# Patient Record
Sex: MALE | Race: WHITE | NOT HISPANIC OR LATINO | Employment: OTHER | URBAN - METROPOLITAN AREA
[De-identification: names, ages, dates, MRNs, and addresses within clinical notes are randomized per-mention and may not be internally consistent; named-entity substitution may affect disease eponyms.]

---

## 2017-01-03 ENCOUNTER — TELEPHONE (OUTPATIENT)
Dept: INTERNAL MEDICINE | Facility: CLINIC | Age: 67
End: 2017-01-03

## 2017-01-03 NOTE — TELEPHONE ENCOUNTER
We could change to Lipitor or simvastatin but it wouldn't work is good.  However about getting Zetia through [facility] and possibly even the simvastatin.  Explain that to him.

## 2017-01-03 NOTE — TELEPHONE ENCOUNTER
Insurance will not cover Vytorin. Can we change to Atorvastatin or Simvastatin. Call him at [phone].

## 2017-01-05 RX ORDER — EZETIMIBE 10 MG/1
10 TABLET ORAL DAILY
Qty: 90 TABLET | Refills: 1 | Status: SHIPPED | OUTPATIENT
Start: 2017-01-05 | End: 2017-08-18 | Stop reason: SDUPTHER

## 2017-01-05 RX ORDER — SIMVASTATIN 40 MG
40 TABLET ORAL NIGHTLY
Qty: 90 TABLET | Refills: 1 | Status: SHIPPED | OUTPATIENT
Start: 2017-01-05 | End: 2017-08-21 | Stop reason: SDUPTHER

## 2017-01-24 ENCOUNTER — OFFICE VISIT (OUTPATIENT)
Dept: INTERNAL MEDICINE | Facility: CLINIC | Age: 67
End: 2017-01-24

## 2017-01-24 VITALS
HEIGHT: 68 IN | BODY MASS INDEX: 35.1 KG/M2 | WEIGHT: 231.6 LBS | OXYGEN SATURATION: 95 % | DIASTOLIC BLOOD PRESSURE: 54 MMHG | HEART RATE: 82 BPM | SYSTOLIC BLOOD PRESSURE: 106 MMHG

## 2017-01-24 DIAGNOSIS — J20.9 ACUTE BRONCHITIS, UNSPECIFIED ORGANISM: Primary | ICD-10-CM

## 2017-01-24 DIAGNOSIS — R63.5 ABNORMAL WEIGHT GAIN: Chronic | ICD-10-CM

## 2017-01-24 DIAGNOSIS — Z91.09 MULTIPLE ENVIRONMENTAL ALLERGIES: Chronic | ICD-10-CM

## 2017-01-24 DIAGNOSIS — E66.9 NON MORBID OBESITY, UNSPECIFIED OBESITY TYPE: Chronic | ICD-10-CM

## 2017-01-24 DIAGNOSIS — E78.5 HYPERLIPIDEMIA, UNSPECIFIED HYPERLIPIDEMIA TYPE: Chronic | ICD-10-CM

## 2017-01-24 DIAGNOSIS — J01.90 ACUTE SINUSITIS, RECURRENCE NOT SPECIFIED, UNSPECIFIED LOCATION: ICD-10-CM

## 2017-01-24 DIAGNOSIS — R73.01 IMPAIRED FASTING GLUCOSE: Chronic | ICD-10-CM

## 2017-01-24 DIAGNOSIS — K21.9 GASTROESOPHAGEAL REFLUX DISEASE WITHOUT ESOPHAGITIS: Chronic | ICD-10-CM

## 2017-01-24 PROBLEM — Z87.09 HISTORY OF ACUTE SINUSITIS: Status: ACTIVE | Noted: 2017-01-24

## 2017-01-24 PROCEDURE — 99214 OFFICE O/P EST MOD 30 MIN: CPT | Performed by: INTERNAL MEDICINE

## 2017-01-24 RX ORDER — GUAIFENESIN AND CODEINE PHOSPHATE 100; 10 MG/5ML; MG/5ML
SOLUTION ORAL
Qty: 180 ML | Refills: 0 | Status: SHIPPED | OUTPATIENT
Start: 2017-01-24 | End: 2017-02-09

## 2017-01-24 RX ORDER — CEFDINIR 300 MG/1
CAPSULE ORAL
Qty: 24 CAPSULE | Refills: 0 | Status: SHIPPED | OUTPATIENT
Start: 2017-01-24 | End: 2017-02-09

## 2017-01-24 NOTE — MR AVS SNAPSHOT
Nilson Buchanan   1/24/2017 7:00 AM   Office Visit    Dept Phone:  [phone]   Encounter #:  [id number]    Provider:  Marco Kenney MD   Department:  [facility]                Your Full Care Plan              Today's Medication Changes          These changes are accurate as of: 1/24/17  7:27 AM.  If you have any questions, ask your nurse or doctor.               New Medication(s)Ordered:     cefdinir 300 MG capsule   Commonly known as:  OMNICEF   1 by mouth twice a day until gone   Started by:  Marco Kenney MD       guaifenesin-codeine 100-10 MG/5ML liquid   Commonly known as:  GUAIFENESIN AC   1 teaspoon by mouth every 4-6 hours as needed for cough   Started by:  Marco Kenney MD            Where to Get Your Medications      These medications were sent to [address] [phone]  - [phone] [address] [address]     Phone:  [phone]     cefdinir 300 MG capsule         You can get these medications from any pharmacy     Bring a paper prescription for each of these medications     guaifenesin-codeine 100-10 MG/5ML liquid                  Your Updated Medication List          This list is accurate as of: 1/24/17  7:27 AM.  Always use your most recent med list.                Avanafil 200 MG tablet       cefdinir 300 MG capsule   Commonly known as:  OMNICEF   1 by mouth twice a day until gone       ezetimibe 10 MG tablet   Commonly known as:  ZETIA   Take 1 tablet by mouth Daily.       fluticasone 50 MCG/ACT nasal spray   Commonly known as:  FLONASE       guaifenesin-codeine 100-10 MG/5ML liquid   Commonly known as:  GUAIFENESIN AC   1 teaspoon by mouth every 4-6 hours as needed for cough       levothyroxine 125 MCG tablet   Commonly known as:  SYNTHROID, LEVOTHROID   TAKE ONE TABLET BY MOUTH DAILY       phentermine 37.5 MG capsule   Take 1 by mouth  every morning as directed       simvastatin 40 MG tablet   Commonly known as:  ZOCOR   Take 1 tablet by mouth Every Night.       STAHIST AD 25-60 MG tablet   Generic drug:  Chlorcyclizine-Pseudoephed   TAKE ONE TABLET BY MOUTH TWICE A DAY FOR CONGESTION       topiramate 50 MG tablet   Commonly known as:  TOPAMAX   1 by mouth daily or as directed       vitamin D3 5000 UNITS capsule capsule               You Were Diagnosed With        Codes Comments    Acute bronchitis, unspecified organism    -  Primary ICD-10-CM: J20.9  ICD-9-CM: 466.0     Acute sinusitis, recurrence not specified, unspecified location     ICD-10-CM: J01.90  ICD-9-CM: 461.9     Abnormal weight gain     ICD-10-CM: R63.5  ICD-9-CM: 783.1     Non morbid obesity, unspecified obesity type     ICD-10-CM: E66.9  ICD-9-CM: 278.00     Impaired fasting glucose     ICD-10-CM: R73.01  ICD-9-CM: 790.21     Hyperlipidemia, unspecified hyperlipidemia type     ICD-10-CM: E78.5  ICD-9-CM: 272.4     Gastroesophageal reflux disease without esophagitis     ICD-10-CM: K21.9  ICD-9-CM: 530.81     Multiple environmental allergies     ICD-10-CM: Z91.09  ICD-9-CM: V15.09       Instructions     None    Patient Instructions History      Upcoming Appointments     Visit Type Date Time Department    OFFICE VISIT 2017  7:00 AM [facility]      [employer] Signup     [facility] [employer] allows you to send messages to your doctor, view your test results, renew your prescriptions, schedule appointments, and more. To sign up, go to [employer] and click on the Sign Up Now link in the New User? box. Enter your [employer] Activation Code exactly as it appears below along with the last four digits of your Social Security Number and your Date of Birth () to complete the sign-up process. If you do not sign up before the expiration date, you must request a new code.    [employer] Activation Code: [email or web address]  Expires: 2017  7:27 AM    If you have questions,  "you can email [email or web address] or call [phone] to talk to our MyChart staff. Remember, [employer] is NOT to be used for urgent needs. For medical emergencies, dial 911.               Other Info from Your Visit           Other Notes About Your Plan     Please DO NOT MOVE diagnosis from the problem list to past medical history!  As the primary care physician, I CAN NOT assess problems in the past.  Those problems are resolved, not active.  You are causing unnecessary work for me and my staff.  If you didn't author it, leave it alone.  Marco Kenney M.D. Internal medicine.             Allergies     No Known Allergies      Vital Signs     Blood Pressure Pulse Height Weight Oxygen Saturation Body Mass Index    106/54 82 68\" (172.7 cm) 231 lb 9.6 oz (105 kg) 95% 35.21 kg/m2    Smoking Status                   Former Smoker           Problems and Diagnoses Noted     Abnormal weight gain    Acute bronchitis    Acute sinus infection    Seasonal allergic rhinitis due to pollen    Benign colon polyp    Acid reflux disease    High cholesterol or triglycerides    Hypogonadism male    Underactive thyroid    Increased fasting blood sugar    Male erectile disorder    Multiple environmental allergies    Non morbid obesity    Sleep apnea    Vitamin D deficiency      No Longer an Issue     Family history of liver cancer        "

## 2017-01-24 NOTE — PROGRESS NOTES
01/24/2017    Patient Information  Nilson Buchanan                                                                                          [address] [facility] [facility] [address]      1950  [phone]      Chief Complaint:     Complaining of head congestion and cough.  Follow-up abnormal weight gain/non-morbid obesity.    History of Present Illness:    Patient with a history of abnormal weight gain/nonmorbid obesity and various other comorbidities including impaired fasting glucose, esophageal reflux, hyperlipidemia, and sleep apnea.  He presents today initially to follow-up on recent initiation of medication for his abnormal weight gain.  However, he has complaints consistent with sinusitis and acute bronchitis which will be described below.  Past medical history reviewed and updated where necessary.      The history regarding head congestion and cough:    01/24/2017--patient presents with a two-week history of head congestion, cough productive of thick yellow phlegm which causes a severe frontal headache.  No significant nasal drainage.  No definite pressure in his sinuses but there is definitely pain when he coughs.  No fever, chills, or other systemic signs or symptoms.  Examination reveals boggy nasal mucosa.  There is no tenderness to percussion over the sinuses.  No cervical adenopathy.  Lung examination reveals mild bilateral rhonchi but no wheezes.  Assessment is acute sinusitis and acute bronchitis.  Cefdinir 300 mg by mouth 3 times a day ×12 days.  Patient should continue Stahist AD.  Cheratussin cough syrup.      The history regarding abnormal weight gain/non-morbid obesity:    01/24/2017--patient seen in follow-up and has lost 10 pounds.  His weight is now 231.  Continue present regimen.    12/19/2016--patient reports a several year history of weight gain despite multiple attempts at various diet and exercise regimens.  It seemed like anything that he tries is unsuccessful.  He  has several comorbidities that would benefit from weight loss including hyperlipidemia, hypogonadism, hypothyroidism, impaired fasting glucose.  Patient would like to try phentermine and I think this is certainly reasonable.  Phentermine 1 by mouth daily.  Topiramate 50 mg by mouth daily.  Patient will follow-up in about one month to reassess.      Review of Systems   Constitution: Negative.   HENT: Positive for congestion.    Eyes: Negative.    Cardiovascular: Negative.    Respiratory: Positive for cough and sputum production.    Endocrine: Negative.    Hematologic/Lymphatic: Negative.    Skin: Negative.    Musculoskeletal: Negative.    Gastrointestinal: Negative.    Genitourinary: Negative.    Neurological: Negative.    Psychiatric/Behavioral: Negative.    Allergic/Immunologic: Negative.        Active Problems:    Patient Active Problem List   Diagnosis   • Allergic rhinitis   • Benign colon polyp, 2010--serrated adenoma ×1.  01/14/2016--normal study.   • Gastroesophageal reflux disease without esophagitis   • Hyperlipidemia   • Hypogonadism male, patient elects no TRT.   • Hypothyroidism   • Impaired fasting glucose   • Male erectile disorder   • Multiple environmental allergies   • Obstructive sleep apnea, 09/08/2005--AHI 18.2.  Oxygen saturation 81%.  Patient cannot tolerate CPAP.   • Vitamin D deficiency   • Acute bronchitis   • Therapeutic drug monitoring   • Prostate cancer screening   • Abnormal weight gain   • Non morbid obesity   • Acute sinusitis         Past Medical History   Diagnosis Date   • History of acute bronchitis with bronchospasm 01/22/2014 01/22/2014--patient presented with a one-week history of cough productive of clear phlegm associated with a marked frontal headache particularly when he coughs. It low-grade fever but no shaking chills. There was some shortness of breath with exertion. Patient treated with Levaquin 750 mg daily x8 days, prednisone 50 mg daily x7 days taper and  discontinue.   • History of Acute serous otitis media 12/22/2014 12/08/2015--patient reports he went to the emergency room 12/22/2014 with complaints of decreased hearing in his left ear associated with tenderness at the angle of the jaw and below the left ear. He also had a slight cough. He had a definite fever of over 102. Influenza test was reportedly negative. Patient was told that he had possible pneumonia and he was treated with Levaquin 500 mg by mouth d   • History of Ankle pain 6/24/2015 06/24/2015--left foot and ankle x-rays reveal hardware from a chronic fracture site distal fibula.  Calcaneal spurring noted.  Degenerative phenomena within the mid foot.  06/24/2015--patient presents with a 3-1/2 week history of left anterior ankle and dorsal foot pain.  This occurred after being attacked by a bull while he was attempting to load it on a truck.  He was charged by the bull and scrambled to jump over a fence and this is when he injured his foot and ankle.  He reports he could not bear weight during the first week but it has improved somewhat here of late.  Examination reveals no obvious ecchymosis or gross deformity.  There is soft tissue swelling present anterior ankle and dorsum of foot.  X-rays of the ankle and foot ordered.   • History of BPPV (benign paroxysmal positional vertigo) 6/20/2016 12/19/2016--patient reports symptoms have resolved.  06/20/2016--patient presents with a new complaint of a several week history of intermittent episodes of dizziness described as a spinning sensation.  It is always associated with abrupt standing for lying down to go to bed.  Only lasts for a very short period of time and there is no other associated symptoms.  He does not feel the symptoms are bad enough at the present time to warrant vestibular therapy.   • History of cardiovascular stress test 03/26/2011 03/26/2011--exercise treadmill stress test revealed no evidence of myocardial ischemia.  Adequate exercise tolerance.   • History of chest x-ray 12/22/2014 12/22/2014--chest x-ray obtained at [facility] ER reveals normal portable chest x-ray.   11/20/2014--chest x-ray reveals somewhat decreased lung volumes with some vascular crowding at the bases compared to the study 02/19/2013. However, I see no definite localized pneumonia and the heart size is normal.   02/19/2013--normal chest x-ray.   • History of Foot pain 6/24/2015 06/24/2015--left foot and ankle x-rays reveal hardware from a chronic fracture site distal fibula.  Calcaneal spurring noted.  Degenerative phenomena within the mid foot.  06/24/2015--patient presents with a 3-1/2 week history of left anterior ankle and dorsal foot pain.  This occurred after being attacked by a bull while he was attempting to load it on a truck.  He was charged by the bull and scrambled to jump over a fence and this is when he injured his foot and ankle.  He reports he could not bear weight during the first week but it has improved somewhat here of late.  Examination reveals no obvious ecchymosis or gross deformity.  There is soft tissue swelling present anterior ankle and dorsum of foot.  X-rays of the ankle and foot ordered.   • History of pneumococcal vaccination 06/20/2016 06/20/2016--Prevnar 13 given.  Will repeat PPSV 23 in about one year.  05/11/2015--PPSV 23 given. Patient is exactly 65 years of age.   • History of pneumonia 12/06/2014 12/06/2014--patient seen in follow up and reports he is definitely better but he is still having a cough that seems to come and go. This been no fever. He still has some shortness of breath but this is better. Lung examination reveals continued bibasilar crackles but they are somewhat improved over previous. I reviewed the chest x-ray results and even though the chest x-ray shows no definite pneum   • History of tetanus, diphtheria, and acellular pertussis booster vaccination (Tdap) 10/18/2013      10/18/2013--Tdap given   • History of Thoracic compression fracture 03/28/2004 03/28/2004--patient fell and suffered a moderate T12 compression fracture. No subluxation or retropulsion of fracture fragments is seen. Approximately 40% loss of anterior vertebral body height.   • History of Zostavax administration 10/20/2014     10/20/2014--Zostavax given at the [facility].         Past Surgical History   Procedure Laterality Date   • Ankle surgery Left 2004 2004--repair of left ankle fracture with hardware placement.   • Colonoscopy  05/10/2010     05/10/2010--colonoscopy revealed a 3 mm cecal polyp that was removed completely. The remainder of the colonoscopy was normal. Recommend repeat colonoscopy in 5 years. If the polyp is adenomatous, would recommend all first-degree relatives over age 40 be in screening as well. Pathology returned serrated adenoma.   • Tonsillectomy  Childhood     Childhood tonsillectomy   • Colonoscopy  01/14/2016 01/14/2016--normal colonoscopy.  Recommended repeat study in 10 years.   • Carpal tunnel release Right 1989 1989--right carpal tunnel release.   • Laparoscopic cholecystectomy  04/03/2012 04/03/2012--laparoscopic cholecystectomy. Pathology returned cholesterolosis.         No Known Allergies        Current Outpatient Prescriptions:   •  Avanafil 200 MG tablet, Take  by mouth. Take as directed prior to anticipated sexual activity., Disp: , Rfl:   •  Cholecalciferol (VITAMIN D3) 5000 UNITS capsule capsule, Take 1 capsule by mouth daily., Disp: , Rfl:   •  ezetimibe (ZETIA) 10 MG tablet, Take 1 tablet by mouth Daily., Disp: 90 tablet, Rfl: 1  •  fluticasone (FLONASE) 50 MCG/ACT nasal spray, 2 sprays into each nostril daily. Spray in each nostril., Disp: , Rfl:   •  levothyroxine (SYNTHROID, LEVOTHROID) 125 MCG tablet, TAKE ONE TABLET BY MOUTH DAILY, Disp: 90 tablet, Rfl: 1  •  phentermine 37.5 MG capsule, Take 1 by mouth every morning as directed, Disp: 30 capsule,  "Rfl: 1  •  simvastatin (ZOCOR) 40 MG tablet, Take 1 tablet by mouth Every Night., Disp: 90 tablet, Rfl: 1  •  STAHIST AD 25-60 MG tablet, TAKE ONE TABLET BY MOUTH TWICE A DAY FOR CONGESTION, Disp: 60 tablet, Rfl: 0  •  topiramate (TOPAMAX) 50 MG tablet, 1 by mouth daily or as directed, Disp: 30 tablet, Rfl: 4      Family History   Problem Relation Age of Onset   • Cancer Father      Liver Cancer. Father  from liver cancer.   • Heart attack Brother      Acute Myocardial Infarction.  Brother had a myocardial infarction at age 68.         Social History     Social History   • Marital status:      Spouse name: N/A   • Number of children: N/A   • Years of education: N/A     Occupational History   • Retired - [employer]      Social History Main Topics   • Smoking status: Former Smoker   • Smokeless tobacco: Never Used      Comment: Stopped smoking at age 35.   • Alcohol use Yes      Comment: Moderately   • Drug use: No   • Sexual activity: Yes     Partners: Female     Other Topics Concern   • Not on file     Social History Narrative         Vitals:    17 0653   BP: 106/54   Pulse: 82   SpO2: 95%   Weight: 231 lb 9.6 oz (105 kg)   Height: 68\" (172.7 cm)          Physical Exam:    General: Alert and oriented x 3. Obese.  No acute distress.  Normal affect.  HEENT: Pupils equal, round, reactive to light; extraocular movements intact; sclerae nonicteric; pharynx, ear canals and TMs normal.  There is boggy nasal mucosa but no definite tenderness to percussion over the sinuses.  Neck: Without JVD, thyromegaly, bruit, or adenopathy.  Lungs: Mild bilateral rhonchi without wheezes or signs of consolidation..  Heart: Regular rate and rhythm without murmur, rub, gallop, or click.  Abdomen: Soft, nontender, without hepatosplenomegaly or hernia.  Bowel sounds normal.  : Deferred.  Rectal: Deferred.  Extremities: Without clubbing, cyanosis, edema, or pulse deficit.  Neurologic: Intact without focal deficit.  Normal station " and gait observed during ingress and egress from the examination room.  Skin: Without significant lesion.  Musculoskeletal: Unremarkable.      Lab/other results:      Assessment/Plan:     Diagnosis Plan   1. Acute bronchitis, unspecified organism     2. Acute sinusitis, recurrence not specified, unspecified location     3. Abnormal weight gain     4. Non morbid obesity, unspecified obesity type     5. Impaired fasting glucose     6. Hyperlipidemia, unspecified hyperlipidemia type     7. Gastroesophageal reflux disease without esophagitis     8. Multiple environmental allergies       Patient presents with a history and exam consistent with acute bronchitis and acute sinusitis.  It seems he has an episode of this year about this time of year.  He does have environmental allergies which certainly could be playing a role.  As far as his abnormal weight gain, he is making satisfactory progress on the current medical regimen and we will continue the same.    Start cefdinir 300 mg by mouth twice a day ×12 days.  Continue Stahist AD.  Cheratussin cough syrup. Patient will follow-up in about 6 weeks to reassess the abnormal weight gain.            Procedures

## 2017-02-06 RX ORDER — FLUTICASONE PROPIONATE 50 MCG
SPRAY, SUSPENSION (ML) NASAL
Qty: 3 BOTTLE | Refills: 9 | Status: SHIPPED | OUTPATIENT
Start: 2017-02-06 | End: 2018-12-27 | Stop reason: SDUPTHER

## 2017-02-09 ENCOUNTER — OFFICE VISIT (OUTPATIENT)
Dept: INTERNAL MEDICINE | Facility: CLINIC | Age: 67
End: 2017-02-09

## 2017-02-09 VITALS
BODY MASS INDEX: 35.16 KG/M2 | WEIGHT: 232 LBS | DIASTOLIC BLOOD PRESSURE: 68 MMHG | SYSTOLIC BLOOD PRESSURE: 114 MMHG | HEART RATE: 90 BPM | OXYGEN SATURATION: 97 % | HEIGHT: 68 IN

## 2017-02-09 DIAGNOSIS — N40.1 BENIGN NON-NODULAR PROSTATIC HYPERPLASIA WITH LOWER URINARY TRACT SYMPTOMS: ICD-10-CM

## 2017-02-09 DIAGNOSIS — N41.0 ACUTE PROSTATITIS: ICD-10-CM

## 2017-02-09 DIAGNOSIS — R30.0 DYSURIA: Primary | ICD-10-CM

## 2017-02-09 PROBLEM — Z87.09 HISTORY OF ACUTE SINUSITIS: Status: RESOLVED | Noted: 2017-01-24 | Resolved: 2017-02-09

## 2017-02-09 LAB
BILIRUB BLD-MCNC: NEGATIVE MG/DL
GLUCOSE UR STRIP-MCNC: NEGATIVE MG/DL
KETONES UR QL: NEGATIVE
LEUKOCYTE EST, POC: NEGATIVE
NITRITE UR-MCNC: NEGATIVE MG/ML
PH UR: 6 [PH] (ref 5–8)
PROT UR STRIP-MCNC: NEGATIVE MG/DL
RBC # UR STRIP: NEGATIVE /UL
SP GR UR: 1.02 (ref 1–1.03)
UROBILINOGEN UR QL: NORMAL

## 2017-02-09 PROCEDURE — 81003 URINALYSIS AUTO W/O SCOPE: CPT | Performed by: INTERNAL MEDICINE

## 2017-02-09 PROCEDURE — 99214 OFFICE O/P EST MOD 30 MIN: CPT | Performed by: INTERNAL MEDICINE

## 2017-02-09 RX ORDER — TAMSULOSIN HYDROCHLORIDE 0.4 MG/1
CAPSULE ORAL
Qty: 30 CAPSULE | Refills: 6 | Status: SHIPPED | OUTPATIENT
Start: 2017-02-09 | End: 2017-07-11

## 2017-02-09 RX ORDER — CIPROFLOXACIN 500 MG/1
TABLET, FILM COATED ORAL
Qty: 60 TABLET | Refills: 0 | Status: SHIPPED | OUTPATIENT
Start: 2017-02-09 | End: 2017-03-10

## 2017-02-09 NOTE — PROGRESS NOTES
02/09/2017    Patient Information  Nilson Buchanan                                                                                          [address] [facility] [place] [address]      1950  [phone]      Chief Complaint:     Complaining of problems with urination.    History of Present Illness:    Patient with a history of impaired fasting glucose, hypothyroidism, hypogonadism, hyperlipidemia, esophageal reflux, sleep apnea, history of colon polyps.  He presents today with some problems with urination as will be described below.  Past medical history reviewed and updated where necessary.  This reveals he is up-to-date on his immunizations and also up-to-date on his colonoscopy.  He had a vascular screening study last year but unfortunately we do not have that report.  We are trying to obtain that.    The history regarding urinary difficulties is as follows:    02/29/2017--patient reports sudden onset a few days ago with dysuria and BPH obstructive symptoms.  He had intercourse recently and after that begin to have burning with urination which has continued.  He subsequently developed intermittent obstructive symptoms consisting of urgency, weak stream, dribbling and sensation of incomplete voiding.  Digital rectal examination avoided today.  Urinalysis, urine culture, PSA ordered.  Empiric Cipro 500 mg by mouth twice a day ×30 days, Flomax 0.4 mg by mouth daily.  I will have patient follow-up after completion of antibiotics with a PSA 2 days after completion of the antibiotics providing his PSA comes back elevated or above baseline.    Review of Systems   Constitution: Negative.   HENT: Negative.    Eyes: Negative.    Cardiovascular: Negative.    Respiratory: Negative.    Endocrine: Negative.    Hematologic/Lymphatic: Negative.    Skin: Negative.    Musculoskeletal: Negative.    Gastrointestinal: Negative.    Genitourinary: Positive for dysuria, frequency, hesitancy, incomplete emptying and  urgency.   Neurological: Negative.    Psychiatric/Behavioral: Negative.    Allergic/Immunologic: Negative.        Active Problems:    Patient Active Problem List   Diagnosis   • Allergic rhinitis   • Benign colon polyp, 2010--serrated adenoma ×1.  01/14/2016--normal study.   • Gastroesophageal reflux disease without esophagitis   • Hyperlipidemia   • Hypogonadism male, patient elects no TRT.   • Hypothyroidism   • Impaired fasting glucose   • Male erectile disorder   • Multiple environmental allergies   • Obstructive sleep apnea, 09/08/2005--AHI 18.2.  Oxygen saturation 81%.  Patient cannot tolerate CPAP.   • Vitamin D deficiency   • Therapeutic drug monitoring   • Abnormal weight gain   • Non morbid obesity   • Dysuria   • Acute prostatitis   • Benign prostatic hypertrophy         Past Medical History   Diagnosis Date   • History of acute bronchitis 4/29/2016 01/24/2017--patient presents with a two-week history of head congestion, cough productive of thick yellow phlegm which causes a severe frontal headache.  No significant nasal drainage.  No definite pressure in his sinuses but there is definitely pain when he coughs.  No fever, chills, or other systemic signs or symptoms.  Examination reveals boggy nasal mucosa.  There is no tenderness to percussion over the sinuses.  No cervical adenopathy.  Lung examination reveals mild bilateral rhonchi but no wheezes.  Assessment is acute sinusitis and acute bronchitis.  Cefdinir 300 mg by mouth 3 times a day ×12 days.  Patient should continue Stahist AD.  Cheratussin cough syrup.  02/12/2015--patient presents with a 2 day history of head congestion, sinus pressure, posterior nasal drainage productive of yellow phlegm. Occasional cough. Exam reveals boggy nasal mucosa and tenderness to percussion over the sinuses. Clindamycin 300 mg by mouth 3 times a day 12 days.   05/23/2014--patient reports his symptoms have resolve   • History of acute bronchitis with bronchospasm  01/22/2014 01/22/2014--patient presented with a one-week history of cough productive of clear phlegm associated with a marked frontal headache particularly when he coughs. It low-grade fever but no shaking chills. There was some shortness of breath with exertion. Patient treated with Levaquin 750 mg daily x8 days, prednisone 50 mg daily x7 days taper and discontinue.   • History of Acute serous otitis media 12/22/2014 12/08/2015--patient reports he went to the emergency room 12/22/2014 with complaints of decreased hearing in his left ear associated with tenderness at the angle of the jaw and below the left ear. He also had a slight cough. He had a definite fever of over 102. Influenza test was reportedly negative. Patient was told that he had possible pneumonia and he was treated with Levaquin 500 mg by mouth d   • History of acute sinusitis 1/24/2017 01/24/2017--patient presents with a two-week history of head congestion, cough productive of thick yellow phlegm which causes a severe frontal headache.  No significant nasal drainage.  No definite pressure in his sinuses but there is definitely pain when he coughs.  No fever, chills, or other systemic signs or symptoms.  Examination reveals boggy nasal mucosa.  There is no tenderness to percussion over the sinuses.  No cervical adenopathy.  Lung examination reveals mild bilateral rhonchi but no wheezes.  Assessment is acute sinusitis and acute bronchitis.  Cefdinir 300 mg by mouth 3 times a day ×12 days.  Patient should continue Stahist AD.  Cheratussin cough syrup.  02/12/2015--patient presents with a 2 day history of head congestion, sinus pressure, posterior nasal drainage productive of yellow phlegm. Occasional cough. Exam reveals boggy nasal mucosa and tenderness to percussion over the sinuses. Clindamycin 300 mg by mouth 3 times a day 12 days.   05/23/2014--patient reports his symptoms have resolve   • History of Ankle pain 6/24/2015      06/24/2015--left foot and ankle x-rays reveal hardware from a chronic fracture site distal fibula.  Calcaneal spurring noted.  Degenerative phenomena within the mid foot.  06/24/2015--patient presents with a 3-1/2 week history of left anterior ankle and dorsal foot pain.  This occurred after being attacked by a bull while he was attempting to load it on a truck.  He was charged by the bull and scrambled to jump over a fence and this is when he injured his foot and ankle.  He reports he could not bear weight during the first week but it has improved somewhat here of late.  Examination reveals no obvious ecchymosis or gross deformity.  There is soft tissue swelling present anterior ankle and dorsum of foot.  X-rays of the ankle and foot ordered.   • History of BPPV (benign paroxysmal positional vertigo) 6/20/2016 12/19/2016--patient reports symptoms have resolved.  06/20/2016--patient presents with a new complaint of a several week history of intermittent episodes of dizziness described as a spinning sensation.  It is always associated with abrupt standing for lying down to go to bed.  Only lasts for a very short period of time and there is no other associated symptoms.  He does not feel the symptoms are bad enough at the present time to warrant vestibular therapy.   • History of cardiovascular stress test 03/26/2011 03/26/2011--exercise treadmill stress test revealed no evidence of myocardial ischemia. Adequate exercise tolerance.   • History of chest x-ray 12/22/2014 12/22/2014--chest x-ray obtained at [facility] reveals normal portable chest x-ray.   11/20/2014--chest x-ray reveals somewhat decreased lung volumes with some vascular crowding at the bases compared to the study 02/19/2013. However, I see no definite localized pneumonia and the heart size is normal.   02/19/2013--normal chest x-ray.   • History of Foot pain 6/24/2015 06/24/2015--left foot and ankle x-rays reveal hardware from a  chronic fracture site distal fibula.  Calcaneal spurring noted.  Degenerative phenomena within the mid foot.  06/24/2015--patient presents with a 3-1/2 week history of left anterior ankle and dorsal foot pain.  This occurred after being attacked by a bull while he was attempting to load it on a truck.  He was charged by the bull and scrambled to jump over a fence and this is when he injured his foot and ankle.  He reports he could not bear weight during the first week but it has improved somewhat here of late.  Examination reveals no obvious ecchymosis or gross deformity.  There is soft tissue swelling present anterior ankle and dorsum of foot.  X-rays of the ankle and foot ordered.   • History of pneumococcal vaccination 06/20/2016 06/20/2016--Prevnar 13 given.  Will repeat PPSV 23 in about one year.  05/11/2015--PPSV 23 given. Patient is exactly 65 years of age.   • History of pneumonia 12/06/2014 12/06/2014--patient seen in follow up and reports he is definitely better but he is still having a cough that seems to come and go. This been no fever. He still has some shortness of breath but this is better. Lung examination reveals continued bibasilar crackles but they are somewhat improved over previous. I reviewed the chest x-ray results and even though the chest x-ray shows no definite pneum   • History of tetanus, diphtheria, and acellular pertussis booster vaccination (Tdap) 10/18/2013     10/18/2013--Tdap given   • History of Thoracic compression fracture 03/28/2004 03/28/2004--patient fell and suffered a moderate T12 compression fracture. No subluxation or retropulsion of fracture fragments is seen. Approximately 40% loss of anterior vertebral body height.   • History of Zostavax administration 10/20/2014     10/20/2014--Zostavax given at the [facility].         Past Surgical History   Procedure Laterality Date   • Ankle surgery Left 2004 2004--repair of left ankle fracture with hardware  placement.   • Colonoscopy  05/10/2010     05/10/2010--colonoscopy revealed a 3 mm cecal polyp that was removed completely. The remainder of the colonoscopy was normal. Recommend repeat colonoscopy in 5 years. If the polyp is adenomatous, would recommend all first-degree relatives over age 40 be in screening as well. Pathology returned serrated adenoma.   • Tonsillectomy  Childhood     Childhood tonsillectomy   • Colonoscopy  2016--normal colonoscopy.  Recommended repeat study in 10 years.   • Carpal tunnel release Right 1989--right carpal tunnel release.   • Laparoscopic cholecystectomy  2012--laparoscopic cholecystectomy. Pathology returned cholesterolosis.         No Known Allergies        Current Outpatient Prescriptions:   •  Avanafil 200 MG tablet, Take  by mouth. Take as directed prior to anticipated sexual activity., Disp: , Rfl:   •  Cholecalciferol (VITAMIN D3) 5000 UNITS capsule capsule, Take 1 capsule by mouth daily., Disp: , Rfl:   •  ezetimibe (ZETIA) 10 MG tablet, Take 1 tablet by mouth Daily., Disp: 90 tablet, Rfl: 1  •  fluticasone (FLONASE) 50 MCG/ACT nasal spray, PLACE 2 SPRAYS IN EACH NOSTRIL DAILY, Disp: 3 bottle, Rfl: 9  •  levothyroxine (SYNTHROID, LEVOTHROID) 125 MCG tablet, TAKE ONE TABLET BY MOUTH DAILY, Disp: 90 tablet, Rfl: 1  •  phentermine 37.5 MG capsule, Take 1 by mouth every morning as directed, Disp: 30 capsule, Rfl: 1  •  simvastatin (ZOCOR) 40 MG tablet, Take 1 tablet by mouth Every Night., Disp: 90 tablet, Rfl: 1  •  STAHIST AD 25-60 MG tablet, TAKE ONE TABLET BY MOUTH TWICE A DAY FOR CONGESTION, Disp: 60 tablet, Rfl: 0  •  topiramate (TOPAMAX) 50 MG tablet, 1 by mouth daily or as directed, Disp: 30 tablet, Rfl: 4      Family History   Problem Relation Age of Onset   • Cancer Father      Liver Cancer. Father  from liver cancer.   • Heart attack Brother      Acute Myocardial Infarction.  Brother had a myocardial infarction at  "age 68.         Social History     Social History   • Marital status:      Spouse name: N/A   • Number of children: N/A   • Years of education: N/A     Occupational History   • Retired - [employer]      Social History Main Topics   • Smoking status: Former Smoker   • Smokeless tobacco: Never Used      Comment: Stopped smoking at age 35.   • Alcohol use Yes      Comment: Moderately   • Drug use: No   • Sexual activity: Yes     Partners: Female     Other Topics Concern   • Not on file     Social History Narrative         Vitals:    02/09/17 1123   BP: 114/68   Pulse: 90   SpO2: 97%   Weight: 232 lb (105 kg)   Height: 68\" (172.7 cm)          Physical Exam:    General: Alert and oriented x 3. Obese.  No acute distress.  Normal affect.  HEENT: Pupils equal, round, reactive to light; extraocular movements intact; sclerae nonicteric; pharynx, ear canals and TMs normal.  Neck: Without JVD, thyromegaly, bruit, or adenopathy.  Lungs: Clear to auscultation in all fields.  Heart: Regular rate and rhythm without murmur, rub, gallop, or click.  Abdomen: Soft, nontender, without hepatosplenomegaly or hernia.  Bowel sounds normal.  : Deferred.  Rectal: Deferred.  Extremities: Without clubbing, cyanosis, edema, or pulse deficit.  Neurologic: Intact without focal deficit.  Normal station and gait observed during ingress and egress from the examination room.  Skin: Without significant lesion.  Musculoskeletal: Unremarkable.      Lab/other results:    Automated urinalysis is negative.    Assessment/Plan:     Diagnosis Plan   1. Dysuria  Urine Culture    PSA    POC Urinalysis Dipstick, Automated   2. Acute prostatitis  PSA   3. Benign prostatic hypertrophy  PSA       Patient presents with a history consistent with acute prostatitis and worsening BPH symptoms.  No fever, chills, or other systemic signs or symptoms.    Plan is as follows: Urine culture and PSA sent.  Automated urinalysis already done and was negative.  Start empiric " Cipro 500 mg by mouth twice a day ×30 days.  Start Flomax 0.4 mg by mouth daily which will hopefully be temporary.  Patient will get nonfasting lab work for a PSA in 32 days and then follow-up with me 2 days later.          Procedures

## 2017-02-10 ENCOUNTER — RESULTS ENCOUNTER (OUTPATIENT)
Dept: INTERNAL MEDICINE | Facility: CLINIC | Age: 67
End: 2017-02-10

## 2017-02-10 DIAGNOSIS — N40.1 BENIGN NON-NODULAR PROSTATIC HYPERPLASIA WITH LOWER URINARY TRACT SYMPTOMS: ICD-10-CM

## 2017-02-10 DIAGNOSIS — N41.0 ACUTE PROSTATITIS: ICD-10-CM

## 2017-02-10 DIAGNOSIS — R30.0 DYSURIA: ICD-10-CM

## 2017-02-11 LAB
BACTERIA UR CULT: NO GROWTH
BACTERIA UR CULT: NORMAL
PSA SERPL-MCNC: 1.03 NG/ML (ref 0–4)

## 2017-02-20 RX ORDER — LEVOTHYROXINE SODIUM 0.12 MG/1
TABLET ORAL
Qty: 90 TABLET | Refills: 0 | Status: SHIPPED | OUTPATIENT
Start: 2017-02-20 | End: 2017-05-19 | Stop reason: SDUPTHER

## 2017-02-20 RX ORDER — PHENTERMINE HYDROCHLORIDE 37.5 MG/1
TABLET ORAL
Qty: 30 TABLET | Refills: 2 | OUTPATIENT
Start: 2017-02-20 | End: 2017-03-10

## 2017-03-08 LAB — PSA SERPL-MCNC: 0.63 NG/ML (ref 0–4)

## 2017-03-10 ENCOUNTER — OFFICE VISIT (OUTPATIENT)
Dept: INTERNAL MEDICINE | Facility: CLINIC | Age: 67
End: 2017-03-10

## 2017-03-10 VITALS
OXYGEN SATURATION: 98 % | HEART RATE: 95 BPM | WEIGHT: 229 LBS | HEIGHT: 68 IN | BODY MASS INDEX: 34.71 KG/M2 | DIASTOLIC BLOOD PRESSURE: 60 MMHG | SYSTOLIC BLOOD PRESSURE: 110 MMHG

## 2017-03-10 DIAGNOSIS — N40.1 BENIGN NON-NODULAR PROSTATIC HYPERPLASIA WITH LOWER URINARY TRACT SYMPTOMS: ICD-10-CM

## 2017-03-10 DIAGNOSIS — R63.5 ABNORMAL WEIGHT GAIN: Chronic | ICD-10-CM

## 2017-03-10 DIAGNOSIS — N41.0 ACUTE PROSTATITIS: Primary | ICD-10-CM

## 2017-03-10 DIAGNOSIS — E03.9 HYPOTHYROIDISM, UNSPECIFIED TYPE: Chronic | ICD-10-CM

## 2017-03-10 DIAGNOSIS — Z51.81 THERAPEUTIC DRUG MONITORING: ICD-10-CM

## 2017-03-10 DIAGNOSIS — E66.9 NON MORBID OBESITY, UNSPECIFIED OBESITY TYPE: Chronic | ICD-10-CM

## 2017-03-10 DIAGNOSIS — R73.01 IMPAIRED FASTING GLUCOSE: Chronic | ICD-10-CM

## 2017-03-10 DIAGNOSIS — R30.0 DYSURIA: ICD-10-CM

## 2017-03-10 DIAGNOSIS — E55.9 VITAMIN D DEFICIENCY: Chronic | ICD-10-CM

## 2017-03-10 DIAGNOSIS — E78.5 HYPERLIPIDEMIA, UNSPECIFIED HYPERLIPIDEMIA TYPE: Chronic | ICD-10-CM

## 2017-03-10 PROBLEM — Z87.438 HISTORY OF ACUTE PROSTATITIS: Status: ACTIVE | Noted: 2017-02-09

## 2017-03-10 PROCEDURE — 99214 OFFICE O/P EST MOD 30 MIN: CPT | Performed by: INTERNAL MEDICINE

## 2017-03-10 NOTE — PROGRESS NOTES
03/10/2017    Patient Information  Nilson Buchanan                                                                                          [address] [facility] [address]      1950  [phone]      Chief Complaint:     Follow-up acute prostatitis/dysuria, abnormal weight gain/nonmorbid obesity, impaired fasting glucose, hyperlipidemia, hypothyroidism.  Patient has no new acute complaints.    History of Present Illness:    Patient with a history of medical problems as outlined in the chief complaint presents today for a follow-up on several different issues.  First issue is that of acute dysuria/prostatitis which will be described below.  The other issue is that of abnormal weight gain/nonmorbid obesity which will be described below is well.  Patient has under lying comorbidities including impaired fasting glucose, hyperlipidemia, sleep apnea that would benefit greatly by continued weight loss.  Past medical history reviewed and updated where necessary including his health maintenance parameters.  This reveals he needs his annual Medicare wellness visit.  It also indicates he needs a AAA screening.  However, patient did have [employer] screening but we just cannot find the documentation.  We attempted to obtain that at the last visit.    The history regarding acute prostatitis/dysuria is as follows:    03/10/2017--patient seen in follow-up and his symptoms resolved.  PSA is 0.630.  PSA at the initiation of treatment was 1.03.  Urinalysis and urine culture returned negative.    02/29/2017--patient reports sudden onset a few days ago with dysuria and BPH obstructive symptoms.  He had intercourse recently and after that begin to have burning with urination which has continued.  He subsequently developed intermittent obstructive symptoms consisting of urgency, weak stream, dribbling and sensation of incomplete voiding.  Digital rectal examination avoided today.  Urinalysis, urine culture,  PSA ordered.  Empiric Cipro 500 mg by mouth twice a day ×30 days, Flomax 0.4 mg by mouth daily.  I will have patient follow-up after completion of antibiotics with a PSA 2 days after completion of the antibiotics providing his PSA comes back elevated or above baseline.    The history regarding abnormal weight gain/nonmorbid obesity:    03/10/2017--patient weight is down 3 pounds.  Currently 229 pounds.    01/24/2017--patient seen in follow-up and has lost 10 pounds.  His weight is now 231.  Continue present regimen.    12/19/2016--patient reports a several year history of weight gain despite multiple attempts at various diet and exercise regimens.  It seemed like anything that he tries is unsuccessful.  He has several comorbidities that would benefit from weight loss including hyperlipidemia, hypogonadism, hypothyroidism, impaired fasting glucose.  Patient would like to try phentermine and I think this is certainly reasonable.  Phentermine 1 by mouth daily.  Topiramate 50 mg by mouth daily.  Patient will follow-up in about one month to reassess.    Review of Systems   Constitution: Positive for weight gain.   HENT: Negative.    Eyes: Negative.    Cardiovascular: Negative.    Respiratory: Negative.    Endocrine: Negative.    Hematologic/Lymphatic: Negative.    Skin: Negative.    Musculoskeletal: Negative.    Gastrointestinal: Negative.    Genitourinary: Negative.    Neurological: Negative.    Psychiatric/Behavioral: Negative.    Allergic/Immunologic: Negative.        Active Problems:    Patient Active Problem List   Diagnosis   • Allergic rhinitis   • Benign colon polyp, 2010--serrated adenoma ×1.  01/14/2016--normal study.   • Gastroesophageal reflux disease without esophagitis   • Hyperlipidemia   • Hypogonadism male, patient elects no TRT.   • Hypothyroidism   • Impaired fasting glucose   • Male erectile disorder   • Multiple environmental allergies   • Obstructive sleep apnea, 09/08/2005--AHI 18.2.  Oxygen  saturation 81%.  Patient cannot tolerate CPAP.   • Vitamin D deficiency   • Therapeutic drug monitoring   • Abnormal weight gain   • Non morbid obesity   • Dysuria   • Acute prostatitis   • Benign prostatic hypertrophy         Past Medical History   Diagnosis Date   • History of acute bronchitis 4/29/2016 01/24/2017--patient presents with a two-week history of head congestion, cough productive of thick yellow phlegm which causes a severe frontal headache.  No significant nasal drainage.  No definite pressure in his sinuses but there is definitely pain when he coughs.  No fever, chills, or other systemic signs or symptoms.  Examination reveals boggy nasal mucosa.  There is no tenderness to percussion over the sinuses.  No cervical adenopathy.  Lung examination reveals mild bilateral rhonchi but no wheezes.  Assessment is acute sinusitis and acute bronchitis.  Cefdinir 300 mg by mouth 3 times a day ×12 days.  Patient should continue Stahist AD.  Cheratussin cough syrup.  02/12/2015--patient presents with a 2 day history of head congestion, sinus pressure, posterior nasal drainage productive of yellow phlegm. Occasional cough. Exam reveals boggy nasal mucosa and tenderness to percussion over the sinuses. Clindamycin 300 mg by mouth 3 times a day 12 days.   05/23/2014--patient reports his symptoms have resolve   • History of acute bronchitis with bronchospasm 01/22/2014 01/22/2014--patient presented with a one-week history of cough productive of clear phlegm associated with a marked frontal headache particularly when he coughs. It low-grade fever but no shaking chills. There was some shortness of breath with exertion. Patient treated with Levaquin 750 mg daily x8 days, prednisone 50 mg daily x7 days taper and discontinue.   • History of Acute serous otitis media 12/22/2014 12/08/2015--patient reports he went to the emergency room 12/22/2014 with complaints of decreased hearing in his left ear associated with  tenderness at the angle of the jaw and below the left ear. He also had a slight cough. He had a definite fever of over 102. Influenza test was reportedly negative. Patient was told that he had possible pneumonia and he was treated with Levaquin 500 mg by mouth d   • History of acute sinusitis 1/24/2017 01/24/2017--patient presents with a two-week history of head congestion, cough productive of thick yellow phlegm which causes a severe frontal headache.  No significant nasal drainage.  No definite pressure in his sinuses but there is definitely pain when he coughs.  No fever, chills, or other systemic signs or symptoms.  Examination reveals boggy nasal mucosa.  There is no tenderness to percussion over the sinuses.  No cervical adenopathy.  Lung examination reveals mild bilateral rhonchi but no wheezes.  Assessment is acute sinusitis and acute bronchitis.  Cefdinir 300 mg by mouth 3 times a day ×12 days.  Patient should continue Stahist AD.  Cheratussin cough syrup.  02/12/2015--patient presents with a 2 day history of head congestion, sinus pressure, posterior nasal drainage productive of yellow phlegm. Occasional cough. Exam reveals boggy nasal mucosa and tenderness to percussion over the sinuses. Clindamycin 300 mg by mouth 3 times a day 12 days.   05/23/2014--patient reports his symptoms have resolve   • History of Ankle pain 6/24/2015 06/24/2015--left foot and ankle x-rays reveal hardware from a chronic fracture site distal fibula.  Calcaneal spurring noted.  Degenerative phenomena within the mid foot.  06/24/2015--patient presents with a 3-1/2 week history of left anterior ankle and dorsal foot pain.  This occurred after being attacked by a bull while he was attempting to load it on a truck.  He was charged by the bull and scrambled to jump over a fence and this is when he injured his foot and ankle.  He reports he could not bear weight during the first week but it has improved somewhat here of late.   Examination reveals no obvious ecchymosis or gross deformity.  There is soft tissue swelling present anterior ankle and dorsum of foot.  X-rays of the ankle and foot ordered.   • History of BPPV (benign paroxysmal positional vertigo) 6/20/2016 12/19/2016--patient reports symptoms have resolved.  06/20/2016--patient presents with a new complaint of a several week history of intermittent episodes of dizziness described as a spinning sensation.  It is always associated with abrupt standing for lying down to go to bed.  Only lasts for a very short period of time and there is no other associated symptoms.  He does not feel the symptoms are bad enough at the present time to warrant vestibular therapy.   • History of cardiovascular stress test 03/26/2011 03/26/2011--exercise treadmill stress test revealed no evidence of myocardial ischemia. Adequate exercise tolerance.   • History of chest x-ray 12/22/2014 12/22/2014--chest x-ray obtained at [facility] ER reveals normal portable chest x-ray.   11/20/2014--chest x-ray reveals somewhat decreased lung volumes with some vascular crowding at the bases compared to the study 02/19/2013. However, I see no definite localized pneumonia and the heart size is normal.   02/19/2013--normal chest x-ray.   • History of Foot pain 6/24/2015 06/24/2015--left foot and ankle x-rays reveal hardware from a chronic fracture site distal fibula.  Calcaneal spurring noted.  Degenerative phenomena within the mid foot.  06/24/2015--patient presents with a 3-1/2 week history of left anterior ankle and dorsal foot pain.  This occurred after being attacked by a bull while he was attempting to load it on a truck.  He was charged by the bull and scrambled to jump over a fence and this is when he injured his foot and ankle.  He reports he could not bear weight during the first week but it has improved somewhat here of late.  Examination reveals no obvious ecchymosis or gross deformity.   There is soft tissue swelling present anterior ankle and dorsum of foot.  X-rays of the ankle and foot ordered.   • History of pneumococcal vaccination 06/20/2016 06/20/2016--Prevnar 13 given.  Will repeat PPSV 23 in about one year.  05/11/2015--PPSV 23 given. Patient is exactly 65 years of age.   • History of pneumonia 12/06/2014 12/06/2014--patient seen in follow up and reports he is definitely better but he is still having a cough that seems to come and go. This been no fever. He still has some shortness of breath but this is better. Lung examination reveals continued bibasilar crackles but they are somewhat improved over previous. I reviewed the chest x-ray results and even though the chest x-ray shows no definite pneum   • History of tetanus, diphtheria, and acellular pertussis booster vaccination (Tdap) 10/18/2013     10/18/2013--Tdap given   • History of Thoracic compression fracture 03/28/2004 03/28/2004--patient fell and suffered a moderate T12 compression fracture. No subluxation or retropulsion of fracture fragments is seen. Approximately 40% loss of anterior vertebral body height.   • History of Zostavax administration 10/20/2014     10/20/2014--Zostavax given at the [facility].         Past Surgical History   Procedure Laterality Date   • Ankle surgery Left 2004 2004--repair of left ankle fracture with hardware placement.   • Colonoscopy  05/10/2010     05/10/2010--colonoscopy revealed a 3 mm cecal polyp that was removed completely. The remainder of the colonoscopy was normal. Recommend repeat colonoscopy in 5 years. If the polyp is adenomatous, would recommend all first-degree relatives over age 40 be in screening as well. Pathology returned serrated adenoma.   • Tonsillectomy  Childhood     Childhood tonsillectomy   • Colonoscopy  01/14/2016 01/14/2016--normal colonoscopy.  Recommended repeat study in 10 years.   • Carpal tunnel release Right 1989 1989--right carpal tunnel  release.   • Laparoscopic cholecystectomy  2012--laparoscopic cholecystectomy. Pathology returned cholesterolosis.         No Known Allergies        Current Outpatient Prescriptions:   •  Avanafil 200 MG tablet, Take  by mouth. Take as directed prior to anticipated sexual activity., Disp: , Rfl:   •  Cholecalciferol (VITAMIN D3) 5000 UNITS capsule capsule, Take 1 capsule by mouth daily., Disp: , Rfl:   •  ezetimibe (ZETIA) 10 MG tablet, Take 1 tablet by mouth Daily., Disp: 90 tablet, Rfl: 1  •  fluticasone (FLONASE) 50 MCG/ACT nasal spray, PLACE 2 SPRAYS IN EACH NOSTRIL DAILY, Disp: 3 bottle, Rfl: 9  •  levothyroxine (SYNTHROID, LEVOTHROID) 125 MCG tablet, TAKE ONE TABLET BY MOUTH DAILY, Disp: 90 tablet, Rfl: 0  •  phentermine 37.5 MG capsule, Take 1 by mouth every morning as directed, Disp: 30 capsule, Rfl: 1  •  simvastatin (ZOCOR) 40 MG tablet, Take 1 tablet by mouth Every Night., Disp: 90 tablet, Rfl: 1  •  STAHIST AD 25-60 MG tablet, TAKE ONE TABLET BY MOUTH TWICE A DAY FOR CONGESTION, Disp: 60 tablet, Rfl: 0  •  tamsulosin (FLOMAX) 0.4 MG capsule 24 hr capsule, 1 by mouth daily for prostate, Disp: 30 capsule, Rfl: 6  •  topiramate (TOPAMAX) 50 MG tablet, 1 by mouth daily or as directed, Disp: 30 tablet, Rfl: 4      Family History   Problem Relation Age of Onset   • Cancer Father      Liver Cancer. Father  from liver cancer.   • Heart attack Brother      Acute Myocardial Infarction.  Brother had a myocardial infarction at age 68.         Social History     Social History   • Marital status:      Spouse name: N/A   • Number of children: N/A   • Years of education: N/A     Occupational History   • Retired - [employer]      Social History Main Topics   • Smoking status: Former Smoker   • Smokeless tobacco: Never Used      Comment: Stopped smoking at age 35.   • Alcohol use Yes      Comment: Moderately   • Drug use: No   • Sexual activity: Yes     Partners: Female     Other Topics Concern  "  • Not on file     Social History Narrative         Vitals:    03/10/17 0709   BP: 110/60   Pulse: 95   SpO2: 98%   Weight: 229 lb (104 kg)   Height: 68\" (172.7 cm)          Physical Exam:    General: Alert and oriented x 3. Obese.. No acute distress.  Normal affect.  HEENT: Pupils equal, round, reactive to light; extraocular movements intact; sclerae nonicteric; pharynx, ear canals and TMs normal.  Neck: Without JVD, thyromegaly, bruit, or adenopathy.  Lungs: Clear to auscultation in all fields.  Heart: Regular rate and rhythm without murmur, rub, gallop, or click.  Abdomen: Soft, nontender, without hepatosplenomegaly or hernia.  Bowel sounds normal.  : Deferred.  Rectal: Deferred.  Extremities: Without clubbing, cyanosis, edema, or pulse deficit.  Neurologic: Intact without focal deficit.  Normal station and gait observed during ingress and egress from the examination room.  Skin: Without significant lesion.  Musculoskeletal: Unremarkable.      Lab/other results:  PSA   Order: [id number]   Status:  Final result   Visible to patient:  No (Not Released) Dx:  Dysuria; Acute prostatitis; Benign pr...      Newer results are available. Click to view them now.            Ref Range & Units 4wk ago     PSA 0.000 - 4.000 ng/mL 1.030   Resulting Agency  [facility]   Narrative   Performed at:  [address] [place] [address]  [id number]  : Nathanael Friedman MD, Phone:  [phone]      Specimen Collected: 02/09/17 12:13 PM Last Resulted: 02/11/17  3:09 AM              PSA   Order: [id number]   Status:  Final result   Visible to patient:  No (Not Released) Dx:  Acute prostatitis; Dysuria; Benign pr...      Ref Range & Units 2d ago     PSA 0.000 - 4.000 ng/mL 0.630   Resulting Agency  [facility]   Narrative   Performed at:  [address] [place] [address]  [id number]  : Nathanael Friedman MD, Phone:  [phone]      Specimen Collected: 03/08/17  7:43 AM Last " Resulted: 03/08/17  8:08 PM                  Assessment/Plan:     Diagnosis Plan   1. Acute prostatitis     2. Dysuria     3. Benign prostatic hypertrophy     4. Abnormal weight gain     5. Hyperlipidemia, unspecified hyperlipidemia type     6. Non morbid obesity, unspecified obesity type     7. Hypothyroidism, unspecified type     8. Impaired fasting glucose     9. Vitamin D deficiency     10. Therapeutic drug monitoring         Appears that patient's acute prostatitis has resolved and his PSA is now near baseline.  Recommend finishing the Cipro.  Patient has abnormal weight gain/nonmorbid obesity and is making good progress on the current regimen.  He has several underlying comorbidities as listed which justifies continue use of weight loss medication.  He needs lab work to reassess these conditions which we will order today.    Plan is as follows: Finish Cipro.  No changes in current medical regimen.  Patient will follow-up in 6 weeks with lab prior.  We will once again try to document the [employer] screening.          Procedures

## 2017-03-11 ENCOUNTER — RESULTS ENCOUNTER (OUTPATIENT)
Dept: INTERNAL MEDICINE | Facility: CLINIC | Age: 67
End: 2017-03-11

## 2017-03-11 DIAGNOSIS — Z51.81 THERAPEUTIC DRUG MONITORING: ICD-10-CM

## 2017-03-11 DIAGNOSIS — E03.9 HYPOTHYROIDISM, UNSPECIFIED TYPE: Chronic | ICD-10-CM

## 2017-03-11 DIAGNOSIS — N40.1 BENIGN NON-NODULAR PROSTATIC HYPERPLASIA WITH LOWER URINARY TRACT SYMPTOMS: ICD-10-CM

## 2017-03-11 DIAGNOSIS — R73.01 IMPAIRED FASTING GLUCOSE: Chronic | ICD-10-CM

## 2017-03-11 DIAGNOSIS — E55.9 VITAMIN D DEFICIENCY: Chronic | ICD-10-CM

## 2017-03-11 DIAGNOSIS — E78.5 HYPERLIPIDEMIA, UNSPECIFIED HYPERLIPIDEMIA TYPE: Chronic | ICD-10-CM

## 2017-03-27 RX ORDER — CHLORCYCLIZINE HYDROCHLORIDE AND PSEUDOEPHEDRINE HYDROCHLORIDE 25; 60 MG/1; MG/1
TABLET ORAL
Qty: 60 TABLET | Refills: 2 | Status: SHIPPED | OUTPATIENT
Start: 2017-03-27 | End: 2018-02-07 | Stop reason: SDUPTHER

## 2017-05-19 DIAGNOSIS — E66.9 NON MORBID OBESITY, UNSPECIFIED OBESITY TYPE: ICD-10-CM

## 2017-05-19 DIAGNOSIS — R63.5 ABNORMAL WEIGHT GAIN: ICD-10-CM

## 2017-05-19 RX ORDER — LEVOTHYROXINE SODIUM 0.12 MG/1
TABLET ORAL
Qty: 90 TABLET | Refills: 1 | Status: SHIPPED | OUTPATIENT
Start: 2017-05-19 | End: 2017-11-29 | Stop reason: SDUPTHER

## 2017-05-19 RX ORDER — TOPIRAMATE 50 MG/1
TABLET, FILM COATED ORAL
Qty: 30 TABLET | Refills: 5 | Status: SHIPPED | OUTPATIENT
Start: 2017-05-19 | End: 2017-07-11

## 2017-05-25 RX ORDER — PHENTERMINE HYDROCHLORIDE 37.5 MG/1
TABLET ORAL
Qty: 30 TABLET | Refills: 0 | OUTPATIENT
Start: 2017-05-25 | End: 2017-07-11

## 2017-07-11 ENCOUNTER — OFFICE VISIT (OUTPATIENT)
Dept: INTERNAL MEDICINE | Facility: CLINIC | Age: 67
End: 2017-07-11

## 2017-07-11 ENCOUNTER — HOSPITAL ENCOUNTER (OUTPATIENT)
Dept: GENERAL RADIOLOGY | Facility: HOSPITAL | Age: 67
Discharge: HOME | End: 2017-07-11
Admitting: INTERNAL MEDICINE

## 2017-07-11 VITALS
HEART RATE: 76 BPM | OXYGEN SATURATION: 96 % | HEIGHT: 68 IN | WEIGHT: 234 LBS | SYSTOLIC BLOOD PRESSURE: 106 MMHG | BODY MASS INDEX: 35.46 KG/M2 | DIASTOLIC BLOOD PRESSURE: 58 MMHG

## 2017-07-11 DIAGNOSIS — M75.21 BICIPITAL TENDINITIS, RIGHT SHOULDER: ICD-10-CM

## 2017-07-11 DIAGNOSIS — Z00.00 MEDICARE ANNUAL WELLNESS VISIT, SUBSEQUENT: Primary | ICD-10-CM

## 2017-07-11 DIAGNOSIS — M75.81 RIGHT ROTATOR CUFF TENDINITIS: ICD-10-CM

## 2017-07-11 PROBLEM — Z92.89 HISTORY OF DOPPLER ULTRASOUND: Status: ACTIVE | Noted: 2017-07-11

## 2017-07-11 PROCEDURE — 96160 PT-FOCUSED HLTH RISK ASSMT: CPT | Performed by: INTERNAL MEDICINE

## 2017-07-11 PROCEDURE — 99214 OFFICE O/P EST MOD 30 MIN: CPT | Performed by: INTERNAL MEDICINE

## 2017-07-11 PROCEDURE — 73030 X-RAY EXAM OF SHOULDER: CPT

## 2017-07-11 PROCEDURE — G0439 PPPS, SUBSEQ VISIT: HCPCS | Performed by: INTERNAL MEDICINE

## 2017-07-11 NOTE — PROGRESS NOTES
"QUICK REFERENCE INFORMATION:  The ABCs of the Annual Wellness Visit    Initial Medicare Wellness Visit    HEALTH RISK ASSESSMENT    1950    Recent Hospitalizations:  {Hospitalization history:[id number]::\"No hospitalization(s) within the last year.\"}.        Current Medical Providers:  Patient Care Team:  Marco Kenney MD as PCP - General  Marco Kenney MD as PCP - Family Medicine        Smoking Status:  History   Smoking Status   • Former Smoker   Smokeless Tobacco   • Never Used     Comment: Stopped smoking at age 35.       Alcohol Consumption:  History   Alcohol Use   • Yes     Comment: Moderately       Depression Screen:   PHQ-9 Depression Screening 7/11/2017   Little interest or pleasure in doing things 0   Feeling down, depressed, or hopeless 0   Trouble falling or staying asleep, or sleeping too much 1   Feeling tired or having little energy 0   Poor appetite or overeating 0   Feeling bad about yourself - or that you are a failure or have let yourself or your family down 0   Trouble concentrating on things, such as reading the newspaper or watching television 0   Moving or speaking so slowly that other people could have noticed. Or the opposite - being so fidgety or restless that you have been moving around a lot more than usual 0   Thoughts that you would be better off dead, or of hurting yourself in some way 0   PHQ-9 Total Score 1   If you checked off any problems, how difficult have these problems made it for you to do your work, take care of things at home, or get along with other people? Not difficult at all       Health Habits and Functional and Cognitive Screening:  Functional & Cognitive Status 7/11/2017   Do you have difficulty preparing food and eating? No   Do you have difficulty bathing yourself? No   Do you have difficulty getting dressed? No   Do you have difficulty using the toilet? No   Do you have difficulty moving around from place to place? No   In the past year have you fallen " "or experienced a near fall? No   Do you need help using the phone?  No   Are you deaf or do you have serious difficulty hearing?  No   Do you need help with transportation? No   Do you need help shopping? No   Do you need help preparing meals?  No   Do you need help with housework?  No   Do you need help with laundry? No   Do you need help taking your medications? No   Do you need help managing money? No   Do you have difficulty concentrating, remembering or making decisions? No                  Does the patient have evidence of cognitive impairment? {Yes/No w/ pre-defaulted No:48238::\"No\"}    Asiprin use counseling: {Aspirin :67229}      Recent Lab Results:    Visual Acuity:  No exam data present    Age-appropriate Screening Schedule:  Refer to the list below for future screening recommendations based on patient's age, sex and/or medical conditions. Orders for these recommended tests are listed in the plan section. The patient has been provided with a written plan.    Health Maintenance   Topic Date Due   • INFLUENZA VACCINE  08/01/2017   • PNEUMOCOCCAL VACCINES (65+ LOW/MEDIUM RISK) (2 of 2 - PPSV23) 10/18/2017   • LIPID PANEL  12/12/2017   • TDAP/TD VACCINES (2 - Td) 10/18/2023   • COLONOSCOPY  01/14/2026   • ZOSTER VACCINE  Completed        Subjective   History of Present Illness    Nilson Buchanan is a 67 y.o. male who presents for an Annual Wellness Visit.    The following portions of the patient's history were reviewed and updated as appropriate: {history reviewed:20406::\"allergies\",\"current medications\",\"past family history\",\"past medical history\",\"past social history\",\"past surgical history\",\"problem list\"}.    Outpatient Medications Prior to Visit   Medication Sig Dispense Refill   • Cholecalciferol (VITAMIN D3) 5000 UNITS capsule capsule Take 1 capsule by mouth daily.     • ezetimibe (ZETIA) 10 MG tablet Take 1 tablet by mouth Daily. 90 tablet 1   • fluticasone (FLONASE) 50 MCG/ACT nasal spray PLACE " 2 SPRAYS IN EACH NOSTRIL DAILY 3 bottle 9   • levothyroxine (SYNTHROID, LEVOTHROID) 125 MCG tablet TAKE ONE TABLET BY MOUTH DAILY 90 tablet 1   • simvastatin (ZOCOR) 40 MG tablet Take 1 tablet by mouth Every Night. 90 tablet 1   • STAHIST AD 25-60 MG tablet TAKE ONE TABLET BY MOUTH TWICE A DAY FOR CONGESTION 60 tablet 2   • Avanafil 200 MG tablet Take  by mouth. Take as directed prior to anticipated sexual activity.     • phentermine (ADIPEX-P) 37.5 MG tablet TAKE ONE TABLET BY MOUTH DAILY 30 tablet 0   • phentermine 37.5 MG capsule Take 1 by mouth every morning as directed 30 capsule 1   • tamsulosin (FLOMAX) 0.4 MG capsule 24 hr capsule 1 by mouth daily for prostate 30 capsule 6   • topiramate (TOPAMAX) 50 MG tablet TAKE ONE TABLET BY MOUTH DAILY OR AS DIRECTED 30 tablet 5     No facility-administered medications prior to visit.        Patient Active Problem List   Diagnosis   • Allergic rhinitis   • Benign colon polyp, 2010--serrated adenoma ×1.  01/14/2016--normal study.   • Gastroesophageal reflux disease without esophagitis   • Hyperlipidemia   • Hypogonadism male, patient elects no TRT.   • Hypothyroidism   • Impaired fasting glucose   • Male erectile disorder   • Multiple environmental allergies   • Obstructive sleep apnea, 09/08/2005--AHI 18.2.  Oxygen saturation 81%.  Patient cannot tolerate CPAP.   • Vitamin D deficiency   • Therapeutic drug monitoring   • Abnormal weight gain   • Non morbid obesity   • History of acute prostatitis   • Benign prostatic hypertrophy       Advance Care Planning:  {Advance Directive Status:99776}    Identification of Risk Factors:  Risk factors include: {; WELLNESS RISK FACTORS:95217}.    Review of Systems    Compared to one year ago, the patient feels his physical health is {better worse same:78332}.  Compared to one year ago, the patient feels his mental health is {better worse same:61398}.    Objective     Physical Exam    Vitals:    07/11/17 0659   BP: 106/58   BP  "Location: Right arm   Patient Position: Sitting   Pulse: 76   SpO2: 96%   Weight: 234 lb (106 kg)   Height: 68\" (172.7 cm)   PainSc:   2   PainLoc: Shoulder       Body mass index is 35.58 kg/(m^2).  Discussed the patient's BMI with him. The BMI {BMI plan (MU F measure 421):59773}.    Assessment/Plan   Patient Self-Management and Personalized Health Advice  The patient has been provided with information about: {MC; PERSONALIZED HEALTH ADVICE:45762} and preventive services including:   · {plan:77931}.    Visit Diagnoses:  No diagnosis found.    No orders of the defined types were placed in this encounter.      Outpatient Encounter Prescriptions as of 7/11/2017   Medication Sig Dispense Refill   • Cholecalciferol (VITAMIN D3) 5000 UNITS capsule capsule Take 1 capsule by mouth daily.     • ezetimibe (ZETIA) 10 MG tablet Take 1 tablet by mouth Daily. 90 tablet 1   • fluticasone (FLONASE) 50 MCG/ACT nasal spray PLACE 2 SPRAYS IN EACH NOSTRIL DAILY 3 bottle 9   • levothyroxine (SYNTHROID, LEVOTHROID) 125 MCG tablet TAKE ONE TABLET BY MOUTH DAILY 90 tablet 1   • simvastatin (ZOCOR) 40 MG tablet Take 1 tablet by mouth Every Night. 90 tablet 1   • STAHIST AD 25-60 MG tablet TAKE ONE TABLET BY MOUTH TWICE A DAY FOR CONGESTION 60 tablet 2   • [DISCONTINUED] Avanafil 200 MG tablet Take  by mouth. Take as directed prior to anticipated sexual activity.     • [DISCONTINUED] phentermine (ADIPEX-P) 37.5 MG tablet TAKE ONE TABLET BY MOUTH DAILY 30 tablet 0   • [DISCONTINUED] phentermine 37.5 MG capsule Take 1 by mouth every morning as directed 30 capsule 1   • [DISCONTINUED] tamsulosin (FLOMAX) 0.4 MG capsule 24 hr capsule 1 by mouth daily for prostate 30 capsule 6   • [DISCONTINUED] topiramate (TOPAMAX) 50 MG tablet TAKE ONE TABLET BY MOUTH DAILY OR AS DIRECTED 30 tablet 5     No facility-administered encounter medications on file as of 7/11/2017.        Reviewed use of high risk medication in the elderly: {Response; " yes/no/na:63}  Reviewed for potential of harmful drug interactions in the elderly: {Response; yes/no/na:63}    Follow Up:  No Follow-up on file.     An After Visit Summary and PPPS with all of these plans were given to the patient.

## 2017-07-11 NOTE — PROGRESS NOTES
QUICK REFERENCE INFORMATION:  The ABCs of the Annual Wellness Visit    Subsequent Medicare Wellness Visit    HEALTH RISK ASSESSMENT    1950    Recent Hospitalizations:  No hospitalization(s) within the last year..        Current Medical Providers:  Patient Care Team:  Marco Kenney MD as PCP - General (Internal Medicine)        Smoking Status:  History   Smoking Status   • Former Smoker   Smokeless Tobacco   • Never Used     Comment: Stopped smoking at age 35.       Alcohol Consumption:  History   Alcohol Use   • Yes     Comment: Moderately       Depression Screen:   PHQ-9 Depression Screening 7/11/2017   Little interest or pleasure in doing things 0   Feeling down, depressed, or hopeless 0   Trouble falling or staying asleep, or sleeping too much 1   Feeling tired or having little energy 0   Poor appetite or overeating 0   Feeling bad about yourself - or that you are a failure or have let yourself or your family down 0   Trouble concentrating on things, such as reading the newspaper or watching television 0   Moving or speaking so slowly that other people could have noticed. Or the opposite - being so fidgety or restless that you have been moving around a lot more than usual 0   Thoughts that you would be better off dead, or of hurting yourself in some way 0   PHQ-9 Total Score 1   If you checked off any problems, how difficult have these problems made it for you to do your work, take care of things at home, or get along with other people? Not difficult at all       Health Habits and Functional and Cognitive Screening:  Functional & Cognitive Status 7/11/2017   Do you have difficulty preparing food and eating? No   Do you have difficulty bathing yourself? No   Do you have difficulty getting dressed? No   Do you have difficulty using the toilet? No   Do you have difficulty moving around from place to place? No   In the past year have you fallen or experienced a near fall? No   Do you need help using the  phone?  No   Are you deaf or do you have serious difficulty hearing?  No   Do you need help with transportation? No   Do you need help shopping? No   Do you need help preparing meals?  No   Do you need help with housework?  No   Do you need help with laundry? No   Do you need help taking your medications? No   Do you need help managing money? No   Do you have difficulty concentrating, remembering or making decisions? No              Does the patient have evidence of cognitive impairment? No    Aspirin use counseling: Start ASA 81 mg daily       Recent Lab Results:  CMP:  Lab Results   Component Value Date     (H) 12/12/2016    BUN 15 12/12/2016    CREATININE 1.32 (H) 12/12/2016    EGFRIFNONA 54 (L) 12/12/2016    EGFRIFAFRI 66 12/12/2016    BCR 11.4 12/12/2016     12/12/2016    K 4.4 12/12/2016    CO2 25.2 12/12/2016    CALCIUM 9.3 12/12/2016    PROTENTOTREF 6.9 12/12/2016    ALBUMIN 4.30 12/12/2016    LABGLOBREF 2.6 12/12/2016    LABIL2 1.7 12/12/2016    BILITOT 0.8 12/12/2016    ALKPHOS 73 12/12/2016    AST 17 12/12/2016    ALT 22 12/12/2016     Lipid Panel:  Lab Results   Component Value Date    TRIG 213 (H) 12/12/2016     HbA1c:  Lab Results   Component Value Date    HGBA1C 6.23 (H) 12/12/2016       Visual Acuity:  No exam data present    Age-appropriate Screening Schedule:  Refer to the list below for future screening recommendations based on patient's age, sex and/or medical conditions. Orders for these recommended tests are listed in the plan section. The patient has been provided with a written plan.    Health Maintenance   Topic Date Due   • INFLUENZA VACCINE  08/01/2017   • PNEUMOCOCCAL VACCINES (65+ LOW/MEDIUM RISK) (2 of 2 - PPSV23) 10/18/2017   • LIPID PANEL  12/12/2017   • TDAP/TD VACCINES (2 - Td) 10/18/2023   • COLONOSCOPY  01/14/2026   • ZOSTER VACCINE  Completed        Subjective   History of Present Illness    Nilson Buchanan is a 67 y.o. male who presents for an Subsequent Wellness  Visit.    The following portions of the patient's history were reviewed and updated as appropriate: allergies, current medications, past family history, past medical history, past social history, past surgical history and problem list.    Outpatient Medications Prior to Visit   Medication Sig Dispense Refill   • Cholecalciferol (VITAMIN D3) 5000 UNITS capsule capsule Take 1 capsule by mouth daily.     • ezetimibe (ZETIA) 10 MG tablet Take 1 tablet by mouth Daily. 90 tablet 1   • fluticasone (FLONASE) 50 MCG/ACT nasal spray PLACE 2 SPRAYS IN EACH NOSTRIL DAILY 3 bottle 9   • levothyroxine (SYNTHROID, LEVOTHROID) 125 MCG tablet TAKE ONE TABLET BY MOUTH DAILY 90 tablet 1   • simvastatin (ZOCOR) 40 MG tablet Take 1 tablet by mouth Every Night. 90 tablet 1   • STAHIST AD 25-60 MG tablet TAKE ONE TABLET BY MOUTH TWICE A DAY FOR CONGESTION 60 tablet 2   • Avanafil 200 MG tablet Take  by mouth. Take as directed prior to anticipated sexual activity.     • phentermine (ADIPEX-P) 37.5 MG tablet TAKE ONE TABLET BY MOUTH DAILY 30 tablet 0   • phentermine 37.5 MG capsule Take 1 by mouth every morning as directed 30 capsule 1   • tamsulosin (FLOMAX) 0.4 MG capsule 24 hr capsule 1 by mouth daily for prostate 30 capsule 6   • topiramate (TOPAMAX) 50 MG tablet TAKE ONE TABLET BY MOUTH DAILY OR AS DIRECTED 30 tablet 5     No facility-administered medications prior to visit.        Patient Active Problem List   Diagnosis   • Allergic rhinitis   • Benign colon polyp, 2010--serrated adenoma ×1.  01/14/2016--normal study.   • Gastroesophageal reflux disease without esophagitis   • Hyperlipidemia   • Hypogonadism male, patient elects no TRT.   • Hypothyroidism   • Impaired fasting glucose   • Male erectile disorder   • Multiple environmental allergies   • Obstructive sleep apnea, 09/08/2005--AHI 18.2.  Oxygen saturation 81%.  Patient cannot tolerate CPAP.   • Vitamin D deficiency   • Therapeutic drug monitoring   • Abnormal weight gain   •  "Non morbid obesity   • History of acute prostatitis   • Benign prostatic hypertrophy   • Bicipital tendinitis, right shoulder   • Right rotator cuff tendinitis   • History of carotid Doppler/vascular screen       Advance Care Planning:  has an advance directive - a copy has been provided and is in file    Identification of Risk Factors:  Risk factors include: weight  and cardiovascular risk.    Review of Systems   Musculoskeletal:        Right shoulder pain   All other systems reviewed and are negative.      Compared to one year ago, the patient feels his physical health is the same.  Compared to one year ago, the patient feels his mental health is the same.    Objective       Physical Exam    General: Alert and oriented x 3. Obese. No acute distress.  Normal affect.  HEENT: Pupils equal, round, reactive to light; extraocular movements intact; sclerae nonicteric; pharynx, ear canals and TMs normal.  Neck: Without JVD, thyromegaly, bruit, or adenopathy.  Lungs: Clear to auscultation in all fields.  Heart: Regular rate and rhythm without murmur, rub, gallop, or click.  Abdomen: Soft, nontender, without hepatosplenomegaly or hernia.  Bowel sounds normal.  : Deferred.  Rectal: Deferred.  Extremities: Without clubbing, cyanosis, edema, or pulse deficit.  Neurologic: Intact without focal deficit.  Normal station and gait observed during ingress and egress from the examination room.  Skin: Without significant lesion.  Musculoskeletal: There is tenderness to palpation over the long head of the biceps near the bicipital groove.  Decreased passive ranging of motion with manipulation of the right shoulder, particularly with abduction.    Vitals:    07/11/17 0659   BP: 106/58   BP Location: Right arm   Patient Position: Sitting   Pulse: 76   SpO2: 96%   Weight: 234 lb (106 kg)   Height: 68\" (172.7 cm)   PainSc: 2  Comment: right   PainLoc: Shoulder       Body mass index is 35.58 kg/(m^2).  Discussed the patient's BMI with " him. The BMI is above average; BMI management plan is completed.    Assessment/Plan   Patient Self-Management and Personalized Health Advice  The patient has been provided with information about: diet, exercise, weight management, prevention of cardiac or vascular disease and the relationship between weight and GERD and preventive services including:   · Diabetes screening, see lab orders, Exercise counseling provided, Glaucoma screening recommended, Prostate cancer screening discussed.    Visit Diagnoses:    ICD-10-CM ICD-9-CM   1. Medicare annual wellness visit, subsequent Z00.00 V70.0   2. Bicipital tendinitis, right shoulder M75.21 726.12   3. Right rotator cuff tendinitis M75.81 726.10       No orders of the defined types were placed in this encounter.      Outpatient Encounter Prescriptions as of 7/11/2017   Medication Sig Dispense Refill   • aspirin 81 MG tablet Take 1 tablet by mouth Daily.     • Cholecalciferol (VITAMIN D3) 5000 UNITS capsule capsule Take 1 capsule by mouth daily.     • ezetimibe (ZETIA) 10 MG tablet Take 1 tablet by mouth Daily. 90 tablet 1   • fluticasone (FLONASE) 50 MCG/ACT nasal spray PLACE 2 SPRAYS IN EACH NOSTRIL DAILY 3 bottle 9   • levothyroxine (SYNTHROID, LEVOTHROID) 125 MCG tablet TAKE ONE TABLET BY MOUTH DAILY 90 tablet 1   • simvastatin (ZOCOR) 40 MG tablet Take 1 tablet by mouth Every Night. 90 tablet 1   • STAHIST AD 25-60 MG tablet TAKE ONE TABLET BY MOUTH TWICE A DAY FOR CONGESTION 60 tablet 2   • [DISCONTINUED] Avanafil 200 MG tablet Take  by mouth. Take as directed prior to anticipated sexual activity.     • [DISCONTINUED] phentermine (ADIPEX-P) 37.5 MG tablet TAKE ONE TABLET BY MOUTH DAILY 30 tablet 0   • [DISCONTINUED] phentermine 37.5 MG capsule Take 1 by mouth every morning as directed 30 capsule 1   • [DISCONTINUED] tamsulosin (FLOMAX) 0.4 MG capsule 24 hr capsule 1 by mouth daily for prostate 30 capsule 6   • [DISCONTINUED] topiramate (TOPAMAX) 50 MG tablet TAKE  ONE TABLET BY MOUTH DAILY OR AS DIRECTED 30 tablet 5     No facility-administered encounter medications on file as of 7/11/2017.        Reviewed use of high risk medication in the elderly: not applicable  Reviewed for potential of harmful drug interactions in the elderly: yes    Follow Up:  No Follow-up on file.     An After Visit Summary and PPPS with all of these plans were given to the patient.

## 2017-07-11 NOTE — PROGRESS NOTES
07/11/2017    Patient Information  Nilson Buchanan                                                                                          [address] [facility] [address]      1950  [phone]      Chief Complaint:     Right shoulder pain.    History of Present Illness:    Patient with a history of esophageal reflux, hyperlipidemia, hypogonadism, impaired fasting glucose, hypothyroidism, sleep apnea, environmental allergies.  He presents today with complaints of right shoulder pain and as will be described below.  Past medical history reviewed and updated where necessary including health maintenance parameters.  This reveals he will be up-to-date after today's visit.    The history regarding right shoulder pain/bicipital tendinitis/rotator cuff tendinitis:    07/11/2017--patient presents with a two-month history of right shoulder pain that is located anteriorly but also located deep within the joint.  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  Examination reveals tenderness of the long head of the biceps near the bicipital groove.  There is also somewhat decreased range of motion passively with manipulation, particularly with abduction.  Patient reports he cannot receive cortisone injections because one time his elbow had an issue and he received a cortisone injection and this caused joint to lock up for about 4 days.  Therefore, we will have to be more conservative and treat with anti-inflammatory agents.  Physical therapy is also a possibility.  Trial of Vimovo 500/20, one by mouth twice a day.  If symptoms do not respond then he should contact me for other options such as physical therapy referral for steroid iontophoresis.     Review of Systems   Constitution: Negative.   HENT: Negative.    Eyes: Negative.    Cardiovascular: Negative.    Respiratory: Negative.    Endocrine: Negative.    Hematologic/Lymphatic: Negative.    Skin: Negative.     Musculoskeletal: Negative.         Right shoulder pain   Gastrointestinal: Negative.    Genitourinary: Negative.    Neurological: Negative.    Psychiatric/Behavioral: Negative.    Allergic/Immunologic: Negative.        Active Problems:    Patient Active Problem List   Diagnosis   • Allergic rhinitis   • Benign colon polyp, 2010--serrated adenoma ×1.  01/14/2016--normal study.   • Gastroesophageal reflux disease without esophagitis   • Hyperlipidemia   • Hypogonadism male, patient elects no TRT.   • Hypothyroidism   • Impaired fasting glucose   • Male erectile disorder   • Multiple environmental allergies   • Obstructive sleep apnea, 09/08/2005--AHI 18.2.  Oxygen saturation 81%.  Patient cannot tolerate CPAP.   • Vitamin D deficiency   • Therapeutic drug monitoring   • Abnormal weight gain   • Non morbid obesity   • History of acute prostatitis   • Benign prostatic hypertrophy   • Bicipital tendinitis, right shoulder   • Right rotator cuff tendinitis   • History of carotid Doppler/vascular screen         Past Medical History:   Diagnosis Date   • History of acute bronchitis 4/29/2016 01/24/2017--patient presents with a two-week history of head congestion, cough productive of thick yellow phlegm which causes a severe frontal headache.  No significant nasal drainage.  No definite pressure in his sinuses but there is definitely pain when he coughs.  No fever, chills, or other systemic signs or symptoms.  Examination reveals boggy nasal mucosa.  There is no tenderness to percussion over the sinuses.  No cervical adenopathy.  Lung examination reveals mild bilateral rhonchi but no wheezes.  Assessment is acute sinusitis and acute bronchitis.  Cefdinir 300 mg by mouth 3 times a day ×12 days.  Patient should continue Stahist AD.  Cheratussin cough syrup.  02/12/2015--patient presents with a 2 day history of head congestion, sinus pressure, posterior nasal drainage productive of yellow phlegm. Occasional cough. Exam  reveals boggy nasal mucosa and tenderness to percussion over the sinuses. Clindamycin 300 mg by mouth 3 times a day 12 days.   05/23/2014--patient reports his symptoms have resolve   • History of acute bronchitis with bronchospasm 01/22/2014 01/22/2014--patient presented with a one-week history of cough productive of clear phlegm associated with a marked frontal headache particularly when he coughs. It low-grade fever but no shaking chills. There was some shortness of breath with exertion. Patient treated with Levaquin 750 mg daily x8 days, prednisone 50 mg daily x7 days taper and discontinue.   • History of Acute serous otitis media 12/22/2014 12/08/2015--patient reports he went to the emergency room 12/22/2014 with complaints of decreased hearing in his left ear associated with tenderness at the angle of the jaw and below the left ear. He also had a slight cough. He had a definite fever of over 102. Influenza test was reportedly negative. Patient was told that he had possible pneumonia and he was treated with Levaquin 500 mg by mouth d   • History of acute sinusitis 1/24/2017 01/24/2017--patient presents with a two-week history of head congestion, cough productive of thick yellow phlegm which causes a severe frontal headache.  No significant nasal drainage.  No definite pressure in his sinuses but there is definitely pain when he coughs.  No fever, chills, or other systemic signs or symptoms.  Examination reveals boggy nasal mucosa.  There is no tenderness to percussion over the sinuses.  No cervical adenopathy.  Lung examination reveals mild bilateral rhonchi but no wheezes.  Assessment is acute sinusitis and acute bronchitis.  Cefdinir 300 mg by mouth 3 times a day ×12 days.  Patient should continue Stahist AD.  Cheratussin cough syrup.  02/12/2015--patient presents with a 2 day history of head congestion, sinus pressure, posterior nasal drainage productive of yellow phlegm. Occasional cough. Exam  reveals boggy nasal mucosa and tenderness to percussion over the sinuses. Clindamycin 300 mg by mouth 3 times a day 12 days.   05/23/2014--patient reports his symptoms have resolve   • History of Ankle pain 6/24/2015 06/24/2015--left foot and ankle x-rays reveal hardware from a chronic fracture site distal fibula.  Calcaneal spurring noted.  Degenerative phenomena within the mid foot.  06/24/2015--patient presents with a 3-1/2 week history of left anterior ankle and dorsal foot pain.  This occurred after being attacked by a bull while he was attempting to load it on a truck.  He was charged by the bull and scrambled to jump over a fence and this is when he injured his foot and ankle.  He reports he could not bear weight during the first week but it has improved somewhat here of late.  Examination reveals no obvious ecchymosis or gross deformity.  There is soft tissue swelling present anterior ankle and dorsum of foot.  X-rays of the ankle and foot ordered.   • History of BPPV (benign paroxysmal positional vertigo) 6/20/2016 12/19/2016--patient reports symptoms have resolved.  06/20/2016--patient presents with a new complaint of a several week history of intermittent episodes of dizziness described as a spinning sensation.  It is always associated with abrupt standing for lying down to go to bed.  Only lasts for a very short period of time and there is no other associated symptoms.  He does not feel the symptoms are bad enough at the present time to warrant vestibular therapy.   • History of cardiovascular stress test 03/26/2011 03/26/2011--exercise treadmill stress test revealed no evidence of myocardial ischemia. Adequate exercise tolerance.   • History of chest x-ray 12/22/2014 12/22/2014--chest x-ray obtained at [facility] ER reveals normal portable chest x-ray.   11/20/2014--chest x-ray reveals somewhat decreased lung volumes with some vascular crowding at the bases compared to the study  02/19/2013. However, I see no definite localized pneumonia and the heart size is normal.   02/19/2013--normal chest x-ray.   • History of Foot pain 6/24/2015 06/24/2015--left foot and ankle x-rays reveal hardware from a chronic fracture site distal fibula.  Calcaneal spurring noted.  Degenerative phenomena within the mid foot.  06/24/2015--patient presents with a 3-1/2 week history of left anterior ankle and dorsal foot pain.  This occurred after being attacked by a bull while he was attempting to load it on a truck.  He was charged by the bull and scrambled to jump over a fence and this is when he injured his foot and ankle.  He reports he could not bear weight during the first week but it has improved somewhat here of late.  Examination reveals no obvious ecchymosis or gross deformity.  There is soft tissue swelling present anterior ankle and dorsum of foot.  X-rays of the ankle and foot ordered.   • History of pneumococcal vaccination 06/20/2016 06/20/2016--Prevnar 13 given.  Will repeat PPSV 23 in about one year.  05/11/2015--PPSV 23 given. Patient is exactly 65 years of age.   • History of pneumonia 12/06/2014 12/06/2014--patient seen in follow up and reports he is definitely better but he is still having a cough that seems to come and go. This been no fever. He still has some shortness of breath but this is better. Lung examination reveals continued bibasilar crackles but they are somewhat improved over previous. I reviewed the chest x-ray results and even though the chest x-ray shows no definite pneum   • History of tetanus, diphtheria, and acellular pertussis booster vaccination (Tdap) 10/18/2013    10/18/2013--Tdap given   • History of Thoracic compression fracture 03/28/2004 03/28/2004--patient fell and suffered a moderate T12 compression fracture. No subluxation or retropulsion of fracture fragments is seen. Approximately 40% loss of anterior vertebral body height.   • History of Zostavax  administration 10/20/2014    10/20/2014--Zostavax given at the [facility].         Past Surgical History:   Procedure Laterality Date   • ANKLE SURGERY Left 2004--repair of left ankle fracture with hardware placement.   • CARPAL TUNNEL RELEASE Right 1989--right carpal tunnel release.   • COLONOSCOPY  05/10/2010    05/10/2010--colonoscopy revealed a 3 mm cecal polyp that was removed completely. The remainder of the colonoscopy was normal. Recommend repeat colonoscopy in 5 years. If the polyp is adenomatous, would recommend all first-degree relatives over age 40 be in screening as well. Pathology returned serrated adenoma.   • COLONOSCOPY  2016--normal colonoscopy.  Recommended repeat study in 10 years.   • LAPAROSCOPIC CHOLECYSTECTOMY  2012--laparoscopic cholecystectomy. Pathology returned cholesterolosis.   • TONSILLECTOMY  Childhood    Childhood tonsillectomy         No Known Allergies        Current Outpatient Prescriptions:   •  aspirin 81 MG tablet, Take 1 tablet by mouth Daily., Disp: , Rfl:   •  Cholecalciferol (VITAMIN D3) 5000 UNITS capsule capsule, Take 1 capsule by mouth daily., Disp: , Rfl:   •  ezetimibe (ZETIA) 10 MG tablet, Take 1 tablet by mouth Daily., Disp: 90 tablet, Rfl: 1  •  fluticasone (FLONASE) 50 MCG/ACT nasal spray, PLACE 2 SPRAYS IN EACH NOSTRIL DAILY, Disp: 3 bottle, Rfl: 9  •  levothyroxine (SYNTHROID, LEVOTHROID) 125 MCG tablet, TAKE ONE TABLET BY MOUTH DAILY, Disp: 90 tablet, Rfl: 1  •  simvastatin (ZOCOR) 40 MG tablet, Take 1 tablet by mouth Every Night., Disp: 90 tablet, Rfl: 1  •  STAHIST AD 25-60 MG tablet, TAKE ONE TABLET BY MOUTH TWICE A DAY FOR CONGESTION, Disp: 60 tablet, Rfl: 2      Family History   Problem Relation Age of Onset   • Cancer Father      Liver Cancer. Father  from liver cancer.   • Heart attack Brother      Acute Myocardial Infarction.  Brother had a myocardial infarction at age 68.         Social  "History     Social History   • Marital status:      Spouse name: N/A   • Number of children: N/A   • Years of education: N/A     Occupational History   • Retired - [employer]      Social History Main Topics   • Smoking status: Former Smoker   • Smokeless tobacco: Never Used      Comment: Stopped smoking at age 35.   • Alcohol use Yes      Comment: Moderately   • Drug use: No   • Sexual activity: Yes     Partners: Female     Other Topics Concern   • Not on file     Social History Narrative         Vitals:    07/11/17 0659   BP: 106/58   BP Location: Right arm   Patient Position: Sitting   Pulse: 76   SpO2: 96%   Weight: 234 lb (106 kg)   Height: 68\" (172.7 cm)          Physical Exam:    General: Alert and oriented x 3. Obese. No acute distress.  Normal affect.  HEENT: Pupils equal, round, reactive to light; extraocular movements intact; sclerae nonicteric; pharynx, ear canals and TMs normal.  Neck: Without JVD, thyromegaly, bruit, or adenopathy.  Lungs: Clear to auscultation in all fields.  Heart: Regular rate and rhythm without murmur, rub, gallop, or click.  Abdomen: Soft, nontender, without hepatosplenomegaly or hernia.  Bowel sounds normal.  : Deferred.  Rectal: Deferred.  Extremities: Without clubbing, cyanosis, edema, or pulse deficit.  Neurologic: Intact without focal deficit.  Normal station and gait observed during ingress and egress from the examination room.  Skin: Without significant lesion.  Musculoskeletal: There is tenderness to palpation over the long head of the biceps near the bicipital groove.  Decreased passive ranging of motion with manipulation of the right shoulder, particularly with abduction.    Lab/other results:    I personally reviewed the x-ray obtained today.    Assessment/Plan:     Diagnosis Plan   1. Medicare annual wellness visit, subsequent     2. Bicipital tendinitis, right shoulder     3. Right rotator cuff tendinitis         The subsequent Medicare annual visit is documented " on a separate note.    Patient presents with a classic history for bicipital tendinitis and rotator cuff tendinitis of the right shoulder.  Unfortunately, he cannot tolerate steroid injections.  Therefore we will try nonsteroidal anti-inflammatory drugs and if this does not work we can refer him to physical therapy for consideration of iontophoresis.    Plan is as follows: Vimovo 500/20, one by mouth twice a day.  X-ray of the right shoulder ordered.  Samples given.  Patient should contact me if he continues to have symptoms.          Procedures

## 2017-07-11 NOTE — PROGRESS NOTES
"QUICK REFERENCE INFORMATION:  The ABCs of the Annual Wellness Visit    Initial Medicare Wellness Visit    HEALTH RISK ASSESSMENT    1950    Recent Hospitalizations:  {Hospitalization history:2861499112::\"No hospitalization(s) within the last year.\"}.        Current Medical Providers:  Patient Care Team:  Marco Kenney MD as PCP - General  Marco Kenney MD as PCP - Family Medicine        Smoking Status:  History   Smoking Status   • Former Smoker   Smokeless Tobacco   • Never Used     Comment: Stopped smoking at age 35.       Alcohol Consumption:  History   Alcohol Use   • Yes     Comment: Moderately       Depression Screen:   PHQ-9 Depression Screening 3/10/2017   Little interest or pleasure in doing things 0   Feeling down, depressed, or hopeless 0   Trouble falling or staying asleep, or sleeping too much 0   Feeling tired or having little energy 0   Poor appetite or overeating 0   Feeling bad about yourself - or that you are a failure or have let yourself or your family down 0   Trouble concentrating on things, such as reading the newspaper or watching television 0   Moving or speaking so slowly that other people could have noticed. Or the opposite - being so fidgety or restless that you have been moving around a lot more than usual 0   Thoughts that you would be better off dead, or of hurting yourself in some way 0   PHQ-9 Total Score 0       Health Habits and Functional and Cognitive Screening:  No flowsheet data found.               Does the patient have evidence of cognitive impairment? {Yes/No w/ pre-defaulted No:78337::\"No\"}    Asiprin use counseling: {Aspirin :56629}      Recent Lab Results:    Visual Acuity:  No exam data present    Age-appropriate Screening Schedule:  Refer to the list below for future screening recommendations based on patient's age, sex and/or medical conditions. Orders for these recommended tests are listed in the plan section. The patient has been provided with a " "written plan.    Health Maintenance   Topic Date Due   • INFLUENZA VACCINE  08/01/2017   • PNEUMOCOCCAL VACCINES (65+ LOW/MEDIUM RISK) (2 of 2 - PPSV23) 10/18/2017   • LIPID PANEL  12/12/2017   • TDAP/TD VACCINES (2 - Td) 10/18/2023   • COLONOSCOPY  01/14/2026   • ZOSTER VACCINE  Completed        Subjective   History of Present Illness    Nilson Buchanan is a 67 y.o. male who presents for an Annual Wellness Visit.    The following portions of the patient's history were reviewed and updated as appropriate: {history reviewed:20406::\"allergies\",\"current medications\",\"past family history\",\"past medical history\",\"past social history\",\"past surgical history\",\"problem list\"}.    Outpatient Medications Prior to Visit   Medication Sig Dispense Refill   • Avanafil 200 MG tablet Take  by mouth. Take as directed prior to anticipated sexual activity.     • Cholecalciferol (VITAMIN D3) 5000 UNITS capsule capsule Take 1 capsule by mouth daily.     • ezetimibe (ZETIA) 10 MG tablet Take 1 tablet by mouth Daily. 90 tablet 1   • fluticasone (FLONASE) 50 MCG/ACT nasal spray PLACE 2 SPRAYS IN EACH NOSTRIL DAILY 3 bottle 9   • levothyroxine (SYNTHROID, LEVOTHROID) 125 MCG tablet TAKE ONE TABLET BY MOUTH DAILY 90 tablet 1   • phentermine (ADIPEX-P) 37.5 MG tablet TAKE ONE TABLET BY MOUTH DAILY 30 tablet 0   • phentermine 37.5 MG capsule Take 1 by mouth every morning as directed 30 capsule 1   • simvastatin (ZOCOR) 40 MG tablet Take 1 tablet by mouth Every Night. 90 tablet 1   • STAHIST AD 25-60 MG tablet TAKE ONE TABLET BY MOUTH TWICE A DAY FOR CONGESTION 60 tablet 2   • tamsulosin (FLOMAX) 0.4 MG capsule 24 hr capsule 1 by mouth daily for prostate 30 capsule 6   • topiramate (TOPAMAX) 50 MG tablet TAKE ONE TABLET BY MOUTH DAILY OR AS DIRECTED 30 tablet 5     No facility-administered medications prior to visit.        Patient Active Problem List   Diagnosis   • Allergic rhinitis   • Benign colon polyp, 2010--serrated adenoma ×1.  " 01/14/2016--normal study.   • Gastroesophageal reflux disease without esophagitis   • Hyperlipidemia   • Hypogonadism male, patient elects no TRT.   • Hypothyroidism   • Impaired fasting glucose   • Male erectile disorder   • Multiple environmental allergies   • Obstructive sleep apnea, 09/08/2005--AHI 18.2.  Oxygen saturation 81%.  Patient cannot tolerate CPAP.   • Vitamin D deficiency   • Therapeutic drug monitoring   • Abnormal weight gain   • Non morbid obesity   • History of acute prostatitis   • Benign prostatic hypertrophy       Advance Care Planning:  {Advance Directive Status:44308}    Identification of Risk Factors:  Risk factors include: {; WELLNESS RISK FACTORS:32609}.    Review of Systems    Compared to one year ago, the patient feels his physical health is {better worse same:28031}.  Compared to one year ago, the patient feels his mental health is {better worse same:73538}.    Objective     Physical Exam    There were no vitals filed for this visit.    There is no height or weight on file to calculate BMI.  Discussed the patient's BMI with him. The BMI {[facility]44558}.    Assessment/Plan   Patient Self-Management and Personalized Health Advice  The patient has been provided with information about: {; PERSONALIZED HEALTH ADVICE:76975} and preventive services including:   · {plan:80141}.    Visit Diagnoses:  No diagnosis found.    No orders of the defined types were placed in this encounter.      Outpatient Encounter Prescriptions as of 7/11/2017   Medication Sig Dispense Refill   • Avanafil 200 MG tablet Take  by mouth. Take as directed prior to anticipated sexual activity.     • Cholecalciferol (VITAMIN D3) 5000 UNITS capsule capsule Take 1 capsule by mouth daily.     • ezetimibe (ZETIA) 10 MG tablet Take 1 tablet by mouth Daily. 90 tablet 1   • fluticasone (FLONASE) 50 MCG/ACT nasal spray PLACE 2 SPRAYS IN EACH NOSTRIL DAILY 3 bottle 9   • levothyroxine (SYNTHROID,  LEVOTHROID) 125 MCG tablet TAKE ONE TABLET BY MOUTH DAILY 90 tablet 1   • phentermine (ADIPEX-P) 37.5 MG tablet TAKE ONE TABLET BY MOUTH DAILY 30 tablet 0   • phentermine 37.5 MG capsule Take 1 by mouth every morning as directed 30 capsule 1   • simvastatin (ZOCOR) 40 MG tablet Take 1 tablet by mouth Every Night. 90 tablet 1   • STAHIST AD 25-60 MG tablet TAKE ONE TABLET BY MOUTH TWICE A DAY FOR CONGESTION 60 tablet 2   • tamsulosin (FLOMAX) 0.4 MG capsule 24 hr capsule 1 by mouth daily for prostate 30 capsule 6   • topiramate (TOPAMAX) 50 MG tablet TAKE ONE TABLET BY MOUTH DAILY OR AS DIRECTED 30 tablet 5     No facility-administered encounter medications on file as of 7/11/2017.        Reviewed use of high risk medication in the elderly: {Response; yes/no/na:63}  Reviewed for potential of harmful drug interactions in the elderly: {Response; yes/no/na:63}    Follow Up:  No Follow-up on file.     An After Visit Summary and PPPS with all of these plans were given to the patient.

## 2017-08-18 RX ORDER — EZETIMIBE 10 MG/1
TABLET ORAL
Qty: 90 TABLET | Refills: 1 | Status: SHIPPED | OUTPATIENT
Start: 2017-08-18 | End: 2018-03-28 | Stop reason: SDUPTHER

## 2017-08-21 RX ORDER — SIMVASTATIN 40 MG
TABLET ORAL
Qty: 90 TABLET | Refills: 0 | Status: SHIPPED | OUTPATIENT
Start: 2017-08-21 | End: 2017-11-29 | Stop reason: SDUPTHER

## 2017-11-29 RX ORDER — SIMVASTATIN 40 MG
40 TABLET ORAL NIGHTLY
Qty: 90 TABLET | Refills: 0 | Status: SHIPPED | OUTPATIENT
Start: 2017-11-29 | End: 2018-02-07 | Stop reason: SDUPTHER

## 2017-11-29 RX ORDER — LEVOTHYROXINE SODIUM 0.12 MG/1
TABLET ORAL
Qty: 90 TABLET | Refills: 0 | Status: SHIPPED | OUTPATIENT
Start: 2017-11-29 | End: 2018-08-14

## 2017-12-01 RX ORDER — LEVOTHYROXINE SODIUM 0.12 MG/1
TABLET ORAL
Qty: 90 TABLET | Refills: 1 | Status: SHIPPED | OUTPATIENT
Start: 2017-12-01 | End: 2018-02-07 | Stop reason: SDUPTHER

## 2018-01-15 ENCOUNTER — CLINICAL SUPPORT (OUTPATIENT)
Dept: INTERNAL MEDICINE | Facility: CLINIC | Age: 68
End: 2018-01-15

## 2018-01-15 DIAGNOSIS — Z23 NEED FOR INFLUENZA VACCINATION: Primary | ICD-10-CM

## 2018-01-15 PROCEDURE — 90662 IIV NO PRSV INCREASED AG IM: CPT | Performed by: INTERNAL MEDICINE

## 2018-01-15 PROCEDURE — G0008 ADMIN INFLUENZA VIRUS VAC: HCPCS | Performed by: INTERNAL MEDICINE

## 2018-01-18 ENCOUNTER — TELEPHONE (OUTPATIENT)
Dept: INTERNAL MEDICINE | Facility: CLINIC | Age: 68
End: 2018-01-18

## 2018-01-18 NOTE — TELEPHONE ENCOUNTER
Pt has Flu like symptoms. Headache, cough, very congested. But no fever. He sounds like he has severe URI. Major congestion. He thought he just had a cold but now getting worse. Work in, [place], or call something in? Call [phone].

## 2018-01-19 LAB
25(OH)D3+25(OH)D2 SERPL-MCNC: 39 NG/ML (ref 30–100)
ALBUMIN SERPL-MCNC: 4.2 G/DL (ref 3.5–5.2)
ALBUMIN/CREAT UR: <2.9 MG/G CREAT (ref 0–30)
ALBUMIN/GLOB SERPL: 1.4 G/DL
ALP SERPL-CCNC: 69 U/L (ref 39–117)
ALT SERPL-CCNC: 24 U/L (ref 1–41)
AST SERPL-CCNC: 18 U/L (ref 1–40)
BILIRUB SERPL-MCNC: 0.5 MG/DL (ref 0.1–1.2)
BUN SERPL-MCNC: 15 MG/DL (ref 8–23)
BUN/CREAT SERPL: 13.3 (ref 7–25)
CALCIUM SERPL-MCNC: 9.5 MG/DL (ref 8.6–10.5)
CHLORIDE SERPL-SCNC: 103 MMOL/L (ref 98–107)
CHOLEST SERPL-MCNC: 133 MG/DL (ref 100–199)
CK SERPL-CCNC: 107 U/L (ref 20–200)
CO2 SERPL-SCNC: 26.7 MMOL/L (ref 22–29)
CREAT SERPL-MCNC: 1.13 MG/DL (ref 0.76–1.27)
CREAT UR-MCNC: 103.8 MG/DL
ERYTHROCYTE [DISTWIDTH] IN BLOOD BY AUTOMATED COUNT: 13.1 % (ref 11.5–14.5)
GLOBULIN SER CALC-MCNC: 3 GM/DL
GLUCOSE SERPL-MCNC: 121 MG/DL (ref 65–99)
HBA1C MFR BLD: 6.14 % (ref 4.8–5.6)
HCT VFR BLD AUTO: 42.9 % (ref 40.4–52.2)
HDL SERPL-SCNC: 32.1 UMOL/L
HDLC SERPL-MCNC: 32 MG/DL
HGB BLD-MCNC: 14.2 G/DL (ref 13.7–17.6)
LDL SERPL QN: 19.7 NM
LDL SERPL-SCNC: 829 NMOL/L
LDL SMALL SERPL-SCNC: 682 NMOL/L
LDLC SERPL CALC-MCNC: 47 MG/DL (ref 0–99)
MCH RBC QN AUTO: 31.3 PG (ref 27–32.7)
MCHC RBC AUTO-ENTMCNC: 33.1 G/DL (ref 32.6–36.4)
MCV RBC AUTO: 94.7 FL (ref 79.8–96.2)
MICROALBUMIN UR-MCNC: <3 UG/ML
PLATELET # BLD AUTO: 222 10*3/MM3 (ref 140–500)
POTASSIUM SERPL-SCNC: 4.5 MMOL/L (ref 3.5–5.2)
PROT SERPL-MCNC: 7.2 G/DL (ref 6–8.5)
PSA SERPL-MCNC: 0.69 NG/ML (ref 0–4)
RBC # BLD AUTO: 4.53 10*6/MM3 (ref 4.6–6)
SODIUM SERPL-SCNC: 143 MMOL/L (ref 136–145)
T3FREE SERPL-MCNC: 2.7 PG/ML (ref 2–4.4)
T4 FREE SERPL-MCNC: 1.25 NG/DL (ref 0.93–1.7)
TRIGL SERPL-MCNC: 268 MG/DL (ref 0–149)
TSH SERPL DL<=0.005 MIU/L-ACNC: 13.39 MIU/ML (ref 0.27–4.2)
WBC # BLD AUTO: 6.39 10*3/MM3 (ref 4.5–10.7)

## 2018-02-07 ENCOUNTER — OFFICE VISIT (OUTPATIENT)
Dept: INTERNAL MEDICINE | Facility: CLINIC | Age: 68
End: 2018-02-07

## 2018-02-07 ENCOUNTER — TELEPHONE (OUTPATIENT)
Dept: INTERNAL MEDICINE | Facility: CLINIC | Age: 68
End: 2018-02-07

## 2018-02-07 VITALS
DIASTOLIC BLOOD PRESSURE: 60 MMHG | SYSTOLIC BLOOD PRESSURE: 110 MMHG | HEART RATE: 73 BPM | WEIGHT: 241 LBS | HEIGHT: 68 IN | OXYGEN SATURATION: 98 % | BODY MASS INDEX: 36.53 KG/M2

## 2018-02-07 DIAGNOSIS — Z86.010 HISTORY OF COLON POLYPS: Chronic | ICD-10-CM

## 2018-02-07 DIAGNOSIS — Z51.81 THERAPEUTIC DRUG MONITORING: ICD-10-CM

## 2018-02-07 DIAGNOSIS — E78.5 HYPERLIPIDEMIA, UNSPECIFIED HYPERLIPIDEMIA TYPE: Chronic | ICD-10-CM

## 2018-02-07 DIAGNOSIS — E29.1 HYPOGONADISM MALE: Chronic | ICD-10-CM

## 2018-02-07 DIAGNOSIS — M75.21 BICIPITAL TENDINITIS, RIGHT SHOULDER: ICD-10-CM

## 2018-02-07 DIAGNOSIS — G47.33 OBSTRUCTIVE SLEEP APNEA: Chronic | ICD-10-CM

## 2018-02-07 DIAGNOSIS — M75.81 RIGHT ROTATOR CUFF TENDINITIS: ICD-10-CM

## 2018-02-07 DIAGNOSIS — E55.9 VITAMIN D DEFICIENCY: Chronic | ICD-10-CM

## 2018-02-07 DIAGNOSIS — R73.01 IMPAIRED FASTING GLUCOSE: Primary | Chronic | ICD-10-CM

## 2018-02-07 DIAGNOSIS — N40.1 BENIGN NON-NODULAR PROSTATIC HYPERPLASIA WITH LOWER URINARY TRACT SYMPTOMS: Chronic | ICD-10-CM

## 2018-02-07 DIAGNOSIS — H65.02 ACUTE SEROUS OTITIS MEDIA OF LEFT EAR, RECURRENCE NOT SPECIFIED: ICD-10-CM

## 2018-02-07 DIAGNOSIS — Z23 NEED FOR PNEUMOCOCCAL VACCINATION: ICD-10-CM

## 2018-02-07 DIAGNOSIS — E03.9 HYPOTHYROIDISM, UNSPECIFIED TYPE: Chronic | ICD-10-CM

## 2018-02-07 DIAGNOSIS — K21.9 GASTROESOPHAGEAL REFLUX DISEASE WITHOUT ESOPHAGITIS: Chronic | ICD-10-CM

## 2018-02-07 PROBLEM — Z87.438 HISTORY OF ACUTE PROSTATITIS: Status: RESOLVED | Noted: 2017-02-09 | Resolved: 2018-02-07

## 2018-02-07 PROBLEM — Z92.89 HISTORY OF DOPPLER ULTRASOUND: Status: RESOLVED | Noted: 2017-07-11 | Resolved: 2018-02-07

## 2018-02-07 PROCEDURE — G0009 ADMIN PNEUMOCOCCAL VACCINE: HCPCS | Performed by: INTERNAL MEDICINE

## 2018-02-07 PROCEDURE — 99214 OFFICE O/P EST MOD 30 MIN: CPT | Performed by: INTERNAL MEDICINE

## 2018-02-07 PROCEDURE — 90732 PPSV23 VACC 2 YRS+ SUBQ/IM: CPT | Performed by: INTERNAL MEDICINE

## 2018-02-07 RX ORDER — SIMVASTATIN 40 MG
40 TABLET ORAL NIGHTLY
Qty: 90 TABLET | Refills: 0
Start: 2018-02-07 | End: 2018-10-09 | Stop reason: SDUPTHER

## 2018-02-07 RX ORDER — PREDNISONE 10 MG/1
TABLET ORAL
Qty: 48 TABLET | Refills: 0 | Status: SHIPPED | OUTPATIENT
Start: 2018-02-07 | End: 2018-08-14

## 2018-02-07 NOTE — PROGRESS NOTES
02/07/2018    Patient Information  Nilson Buchanan                                                                                          [address] [facility] [address]      1950  [phone]      Chief Complaint:         History of Present Illness:    Follow-up impaired fasting glucose, hypothyroidism, hyperlipidemia, hypogonadism, esophageal reflux, history of colon polyps, sleep apnea, BPH, vitamin D deficiency, recent bicipital tendinitis and rotator cuff tendinitis of the right shoulder.  Complaining of left ear pressure and decreased hearing.    The history regarding right bicipital tendinitis of right rotator cuff tendinitis:    02/07/2018--patient seen in follow-up and his right shoulder pain/discomfort has resolved.    07/11/2017--patient presents with a two-month history of right shoulder pain that is located anteriorly but also located deep within the joint.  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  Examination reveals tenderness of the long head of the biceps near the bicipital groove.  There is also somewhat decreased range of motion passively with manipulation, particularly with abduction.  Patient reports he cannot receive cortisone injections because one time his elbow had an issue and he received a cortisone injection and this caused joint to lock up for about 4 days.  Therefore, we will have to be more conservative and treat with anti-inflammatory agents.  Physical therapy is also a possibility.  Trial of Vimovo 500/20, one by mouth twice a day.  If symptoms do not respond then he should contact me for other options such as physical therapy referral for steroid iontophoresis.    Review of Systems   Constitution: Negative.   HENT: Negative.         Hearing and pressure sensation of the left ear   Eyes: Negative.    Cardiovascular: Negative.    Respiratory: Negative.    Endocrine: Negative.    Hematologic/Lymphatic: Negative.    Skin:  Negative.    Musculoskeletal: Negative.    Gastrointestinal: Negative.    Genitourinary: Negative.    Neurological: Negative.    Psychiatric/Behavioral: Negative.    Allergic/Immunologic: Negative.        Active Problems:    Patient Active Problem List   Diagnosis   • Allergic rhinitis   • History of colon polyps, 01/14/2016--normal study. 2010--serrated adenoma ×1.     • Gastroesophageal reflux disease without esophagitis   • Hyperlipidemia   • Hypogonadism male, patient elects no TRT.   • Hypothyroidism   • Impaired fasting glucose   • Male erectile disorder   • Multiple environmental allergies   • Obstructive sleep apnea, 09/08/2005--AHI 18.2.  Oxygen saturation 81%.  Patient cannot tolerate CPAP.   • Vitamin D deficiency   • Therapeutic drug monitoring   • Benign prostatic hypertrophy   • History of Bicipital tendinitis, right shoulder   • History of Right rotator cuff tendinitis   • Acute serous otitis media of left ear         Past Medical History:   Diagnosis Date   • Allergic rhinitis 2/21/2014    Patient reports he had allergy testing several years ago in his mid 20s. He reports allergies to molds. He was on immunotherapy for approximately one year and then this was discontinued. He was told he did not need it.   02/21/2014--Flonase 2 sprays each nostril daily initiated.   • Benign prostatic hypertrophy 2/9/2017 02/29/2017--patient reports sudden onset a few days ago with dysuria and BPH obstructive symptoms.  He had intercourse recently and after that begin to have burning with urination which has continued.  He subsequently developed intermittent obstructive symptoms consisting of urgency, weak stream, dribbling and sensation of incomplete voiding.  Digital rectal examination avoided today.  Urinalysis, urine culture, PSA ordered.  Empiric Cipro 500 mg by mouth twice a day ×30 days, Flomax 0.4 mg by mouth daily.  I will have patient follow-up after completion of antibiotics with a PSA 2 days after  completion of the antibiotics providing his PSA comes back elevated or above baseline.   • Gastroesophageal reflux disease without esophagitis 4/29/2016   • History of acute bronchitis 4/29/2016 01/24/2017--patient presents with a two-week history of head congestion, cough productive of thick yellow phlegm which causes a severe frontal headache.  No significant nasal drainage.  No definite pressure in his sinuses but there is definitely pain when he coughs.  No fever, chills, or other systemic signs or symptoms.  Examination reveals boggy nasal mucosa.  There is no tenderness to percussion over the sinuses.  No cervical adenopathy.  Lung examination reveals mild bilateral rhonchi but no wheezes.  Assessment is acute sinusitis and acute bronchitis.  Cefdinir 300 mg by mouth 3 times a day ×12 days.  Patient should continue Stahist AD.  Cheratussin cough syrup.  02/12/2015--patient presents with a 2 day history of head congestion, sinus pressure, posterior nasal drainage productive of yellow phlegm. Occasional cough. Exam reveals boggy nasal mucosa and tenderness to percussion over the sinuses. Clindamycin 300 mg by mouth 3 times a day 12 days.   05/23/2014--patient reports his symptoms have resolve   • History of acute bronchitis with bronchospasm 01/22/2014 01/22/2014--patient presented with a one-week history of cough productive of clear phlegm associated with a marked frontal headache particularly when he coughs. It low-grade fever but no shaking chills. There was some shortness of breath with exertion. Patient treated with Levaquin 750 mg daily x8 days, prednisone 50 mg daily x7 days taper and discontinue.   • History of acute prostatitis 2/9/2017    03/10/2017--patient seen in follow-up and his symptoms resolved.  PSA is 0.630.  PSA at the initiation of treatment was 1.03.  Urinalysis and urine culture returned negative.  02/29/2017--patient reports sudden onset a few days ago with dysuria and BPH  obstructive symptoms.  He had intercourse recently and after that begin to have burning with urination which has continued.  He subsequently developed intermittent obstructive symptoms consisting of urgency, weak stream, dribbling and sensation of incomplete voiding.  Digital rectal examination avoided today.  Urinalysis, urine culture, PSA ordered.  Empiric Cipro 500 mg by mouth twice a day ×30 days, Flomax 0.4 mg by mouth daily.  I will have patient follow-up after completion of antibiotics with a PSA 2 days after completion of the antibiotics providing his PSA comes back elevated or above baseline.   • History of Acute serous otitis media 12/22/2014 12/08/2015--patient reports he went to the emergency room 12/22/2014 with complaints of decreased hearing in his left ear associated with tenderness at the angle of the jaw and below the left ear. He also had a slight cough. He had a definite fever of over 102. Influenza test was reportedly negative. Patient was told that he had possible pneumonia and he was treated with Levaquin 500 mg by mouth d   • History of acute sinusitis 1/24/2017 01/24/2017--patient presents with a two-week history of head congestion, cough productive of thick yellow phlegm which causes a severe frontal headache.  No significant nasal drainage.  No definite pressure in his sinuses but there is definitely pain when he coughs.  No fever, chills, or other systemic signs or symptoms.  Examination reveals boggy nasal mucosa.  There is no tenderness to percussion over the sinuses.  No cervical adenopathy.  Lung examination reveals mild bilateral rhonchi but no wheezes.  Assessment is acute sinusitis and acute bronchitis.  Cefdinir 300 mg by mouth 3 times a day ×12 days.  Patient should continue Stahist AD.  Cheratussin cough syrup.  02/12/2015--patient presents with a 2 day history of head congestion, sinus pressure, posterior nasal drainage productive of yellow phlegm. Occasional cough. Exam  reveals boggy nasal mucosa and tenderness to percussion over the sinuses. Clindamycin 300 mg by mouth 3 times a day 12 days.   05/23/2014--patient reports his symptoms have resolve   • History of Ankle pain 6/24/2015 06/24/2015--left foot and ankle x-rays reveal hardware from a chronic fracture site distal fibula.  Calcaneal spurring noted.  Degenerative phenomena within the mid foot.  06/24/2015--patient presents with a 3-1/2 week history of left anterior ankle and dorsal foot pain.  This occurred after being attacked by a bull while he was attempting to load it on a truck.  He was charged by the bull and scrambled to jump over a fence and this is when he injured his foot and ankle.  He reports he could not bear weight during the first week but it has improved somewhat here of late.  Examination reveals no obvious ecchymosis or gross deformity.  There is soft tissue swelling present anterior ankle and dorsum of foot.  X-rays of the ankle and foot ordered.   • History of BPPV (benign paroxysmal positional vertigo) 6/20/2016 12/19/2016--patient reports symptoms have resolved.  06/20/2016--patient presents with a new complaint of a several week history of intermittent episodes of dizziness described as a spinning sensation.  It is always associated with abrupt standing for lying down to go to bed.  Only lasts for a very short period of time and there is no other associated symptoms.  He does not feel the symptoms are bad enough at the present time to warrant vestibular therapy.   • History of cardiovascular stress test 03/26/2011 03/26/2011--exercise treadmill stress test revealed no evidence of myocardial ischemia. Adequate exercise tolerance.   • History of carotid Doppler/vascular screen 7/11/2017 07/11/2017--patient reports he had what sounds like a [facility] vascular screen in the spring of 2015.  We have attempted multiple times to obtain this documentation but have been unsuccessful less far.   Apparently this study was negative.   • History of chest x-ray 12/22/2014 12/22/2014--chest x-ray obtained at [facility] reveals normal portable chest x-ray.   11/20/2014--chest x-ray reveals somewhat decreased lung volumes with some vascular crowding at the bases compared to the study 02/19/2013. However, I see no definite localized pneumonia and the heart size is normal.   02/19/2013--normal chest x-ray.   • History of colon polyps, 01/14/2016--normal study. 2010--serrated adenoma ×1.   5/10/2010    01/14/2016--normal colonoscopy.  Recommended repeat study in 10 years.  05/10/2010--colonoscopy revealed a 3 mm cecal polyp that was removed completely. The remainder of the colonoscopy was normal. Recommend repeat colonoscopy in 5 years. If the polyp is adenomatous, would recommend all first-degree relatives over age 40 be in screening as well. Pathology returned serrated adenoma.   • History of Foot pain 6/24/2015 06/24/2015--left foot and ankle x-rays reveal hardware from a chronic fracture site distal fibula.  Calcaneal spurring noted.  Degenerative phenomena within the mid foot.  06/24/2015--patient presents with a 3-1/2 week history of left anterior ankle and dorsal foot pain.  This occurred after being attacked by a bull while he was attempting to load it on a truck.  He was charged by the bull and scrambled to jump over a fence and this is when he injured his foot and ankle.  He reports he could not bear weight during the first week but it has improved somewhat here of late.  Examination reveals no obvious ecchymosis or gross deformity.  There is soft tissue swelling present anterior ankle and dorsum of foot.  X-rays of the ankle and foot ordered.   • History of pneumococcal vaccination 06/20/2016 06/20/2016--Prevnar 13 given.  Will repeat PPSV 23 in about one year.  05/11/2015--PPSV 23 given. Patient is exactly 65 years of age.   • History of pneumonia 12/06/2014 12/06/2014--patient seen in  follow up and reports he is definitely better but he is still having a cough that seems to come and go. This been no fever. He still has some shortness of breath but this is better. Lung examination reveals continued bibasilar crackles but they are somewhat improved over previous. I reviewed the chest x-ray results and even though the chest x-ray shows no definite pneum   • History of tetanus, diphtheria, and acellular pertussis booster vaccination (Tdap) 10/18/2013    10/18/2013--Tdap given   • History of Thoracic compression fracture 03/28/2004 03/28/2004--patient fell and suffered a moderate T12 compression fracture. No subluxation or retropulsion of fracture fragments is seen. Approximately 40% loss of anterior vertebral body height.   • History of Zostavax administration 10/20/2014    10/20/2014--Zostavax given at the "[facility]   • Hyperlipidemia 4/29/2016   • Hypogonadism male, patient elects no TRT. 6/10/2013    02/21/2014--treatment for hypothyroidism discontinued. Patient did not feel any better with the testosterone. It also is extremely expensive.  06/10/2013--treatment for hypogonadism begun.      • Hypothyroidism 4/29/2016   • Impaired fasting glucose 4/29/2016   • Male erectile disorder 11/23/2015 11/23/2015--patient presents with complaints of erectile dysfunction. He does have borderline hypogonadism but previous treatment was not particularly effective. I gave him samples of Stendra, Viagra, Staxyn.   • Multiple environmental allergies 4/29/2016    Patient reports he had allergy testing several years ago in his mid 20s. He reports allergies to molds. He was on immunotherapy for approximately one year and then this was discontinued. He was told he did not need it.   • Obstructive sleep apnea, 09/08/2005--AHI 18.2.  Oxygen saturation 81%.  Patient cannot tolerate CPAP. 9/8/2005 09/08/2005--sleep study revealed an apnea/hypopnea index for total sleep time to be moderately abnormal at 18.2.  Lowest oxygen saturation 81%. CPAP was tried but patient is intolerant.   • Vitamin D deficiency 2016         Past Surgical History:   Procedure Laterality Date   • ANKLE SURGERY Left 2004--repair of left ankle fracture with hardware placement.   • CARPAL TUNNEL RELEASE Right 1989--right carpal tunnel release.   • COLONOSCOPY  05/10/2010    05/10/2010--colonoscopy revealed a 3 mm cecal polyp that was removed completely. The remainder of the colonoscopy was normal. Recommend repeat colonoscopy in 5 years. If the polyp is adenomatous, would recommend all first-degree relatives over age 40 be in screening as well. Pathology returned serrated adenoma.   • COLONOSCOPY  2016--normal colonoscopy.  Recommended repeat study in 10 years.   • LAPAROSCOPIC CHOLECYSTECTOMY  2012--laparoscopic cholecystectomy. Pathology returned cholesterolosis.   • TONSILLECTOMY  Childhood    Childhood tonsillectomy         No Known Allergies        Current Outpatient Prescriptions:   •  aspirin 81 MG tablet, Take 1 tablet by mouth Daily., Disp: , Rfl:   •  Cholecalciferol (VITAMIN D3) 5000 UNITS capsule capsule, Take 1 capsule by mouth daily., Disp: , Rfl:   •  ezetimibe (ZETIA) 10 MG tablet, TAKE 1 TABLET DAILY, Disp: 90 tablet, Rfl: 1  •  fluticasone (FLONASE) 50 MCG/ACT nasal spray, PLACE 2 SPRAYS IN EACH NOSTRIL DAILY, Disp: 3 bottle, Rfl: 9  •  levothyroxine (SYNTHROID, LEVOTHROID) 125 MCG tablet, TAKE ONE TABLET BY MOUTH DAILY, Disp: 90 tablet, Rfl: 0  •  simvastatin (ZOCOR) 40 MG tablet, Take 1 tablet by mouth Every Night., Disp: 90 tablet, Rfl: 0  •  STAHIST AD 25-60 MG tablet, TAKE ONE TABLET BY MOUTH TWICE A DAY FOR CONGESTION, Disp: 60 tablet, Rfl: 2      Family History   Problem Relation Age of Onset   • Cancer Father      Liver Cancer. Father  from liver cancer.   • Heart attack Brother      Acute Myocardial Infarction.  Brother had a myocardial infarction at age 68.  "        Social History     Social History   • Marital status:      Spouse name: N/A   • Number of children: N/A   • Years of education: N/A     Occupational History   • Retired - [employer]      Social History Main Topics   • Smoking status: Former Smoker   • Smokeless tobacco: Never Used      Comment: Stopped smoking at age 35.   • Alcohol use Yes      Comment: Moderately   • Drug use: No   • Sexual activity: Yes     Partners: Female     Other Topics Concern   • Not on file     Social History Narrative         Vitals:    02/07/18 0708   BP: 110/60   Pulse: 73   SpO2: 98%   Weight: 109 kg (241 lb)   Height: 172.7 cm (67.99\")          Physical Exam:    General: Alert and oriented x 3. Obese.  No acute distress.  Normal affect.  HEENT: Pupils equal, round, reactive to light; extraocular movements intact; sclerae nonicteric; pharynx, ear canals and TMs normal, with the exception of a clear air-fluid level behind the left tympanic membrane which is also somewhat sunken.  Not erythematous.  Neck: Without JVD, thyromegaly, bruit, or adenopathy.  Lungs: Clear to auscultation in all fields.  Heart: Regular rate and rhythm without murmur, rub, gallop, or click.  Abdomen: Soft, nontender, without hepatosplenomegaly or hernia.  Bowel sounds normal.  : Deferred.  Rectal: Deferred.  Extremities: Without clubbing, cyanosis, edema, or pulse deficit.  Neurologic: Intact without focal deficit.  Normal station and gait observed during ingress and egress from the examination room.  Skin: Without significant lesion.  Musculoskeletal: Unremarkable.      Lab/other results:    NMR reveals a total cholesterol 133.  Triglycerides elevated at 268.  LDL particle number excellent at 829.  Small LDL particle number mildly elevated at 682.  HDL particle number normal at 32.1.  CMP normal except blood sugar elevated at 121.  CBC is essentially normal.  Albumin/creatinine ratio was normal.  Hemoglobin A1c 6.14.  TSH elevated at 13.39.  PSA " normal at 0.689.  Vitamin D normal.  CPK normal.    Assessment/Plan:     Diagnosis Plan   1. Impaired fasting glucose     2. Hypothyroidism, unspecified type     3. Hyperlipidemia, unspecified hyperlipidemia type  simvastatin (ZOCOR) 40 MG tablet   4. Hypogonadism male, patient elects no TRT.     5. Gastroesophageal reflux disease without esophagitis     6. History of colon polyps, 01/14/2016--normal study. 2010--serrated adenoma ×1.       7. Obstructive sleep apnea, 09/08/2005--AHI 18.2.  Oxygen saturation 81%.  Patient cannot tolerate CPAP.     8. Benign prostatic hypertrophy     9. Vitamin D deficiency     10. History of Bicipital tendinitis, right shoulder     11. History of Right rotator cuff tendinitis     12. Acute serous otitis media of left ear, recurrence not specified     13. Therapeutic drug monitoring       Patient has impaired fasting glucoses does not require medication.  His TSH is elevated but this is because patient was out of his levothyroxine.  He went on vacation and forgot to bring it.  Hyperlipidemia is under good control.  Patient has hypogonadism which he has elected not to treat.  Refluxes been asymptomatic.  Patient has a history of colon polyps and had a normal colonoscopy about 2 years ago.  Patient has sleep apnea but unfortunately cannot tolerate CPAP.  BPH is asymptomatic.  Vitamin D therapeutic.  His bicipital tendinitis and rotator cuff tendinitis of the right shoulder have resolved.  Patient has a new complaint and history and exam is consistent with acute serous otitis media of the left ear.    Plan is as follows: Stahist AD one by mouth every 6 hours, not more than 3 in 24 hours.  I recommended that patient take it 3 times a day for at least the next 5-6 days.  Prednisone 50 mg by mouth daily ×5 days, taper and discontinue.  I explained to patient if he gets a very quick response to therapy then he can stop the prednisone without taper.  I will have him follow-up after 07/11/2018  with lab prior.  This will also be subsequent Medicare wellness visit.  His final pneumococcal vaccination given today, PPSV 23.        Procedures

## 2018-02-07 NOTE — TELEPHONE ENCOUNTER
Pt called stating that he had a reaction to the PPSV 23 shot this morning. Has a knot and shooting pain to elbow.    Per Dr. Kenney, told pt to put hot and cold compresses and it should get better. Told pt to call us tomorrow. Also told him that if anything gets worse to go to UC or ER.

## 2018-03-29 RX ORDER — SIMVASTATIN 40 MG
TABLET ORAL
Qty: 90 TABLET | Refills: 1 | Status: SHIPPED | OUTPATIENT
Start: 2018-03-29 | End: 2018-08-14 | Stop reason: SDUPTHER

## 2018-03-29 RX ORDER — EZETIMIBE 10 MG/1
TABLET ORAL
Qty: 90 TABLET | Refills: 1 | Status: SHIPPED | OUTPATIENT
Start: 2018-03-29 | End: 2018-10-09 | Stop reason: SDUPTHER

## 2018-05-07 ENCOUNTER — TELEPHONE (OUTPATIENT)
Dept: INTERNAL MEDICINE | Facility: CLINIC | Age: 68
End: 2018-05-07

## 2018-05-07 NOTE — TELEPHONE ENCOUNTER
Pt knows a 80 year old couple who needs a new Dr. He is retired from the  and says he has good insurance. Would you take them on? Call [phone].

## 2018-07-31 DIAGNOSIS — E78.5 HYPERLIPIDEMIA, UNSPECIFIED HYPERLIPIDEMIA TYPE: Chronic | ICD-10-CM

## 2018-07-31 DIAGNOSIS — E03.9 HYPOTHYROIDISM, UNSPECIFIED TYPE: Chronic | ICD-10-CM

## 2018-07-31 DIAGNOSIS — R73.01 IMPAIRED FASTING GLUCOSE: Chronic | ICD-10-CM

## 2018-07-31 DIAGNOSIS — Z51.81 THERAPEUTIC DRUG MONITORING: ICD-10-CM

## 2018-08-02 LAB
ALBUMIN SERPL-MCNC: 4.3 G/DL (ref 3.5–5.2)
ALBUMIN/GLOB SERPL: 2.2 G/DL
ALP SERPL-CCNC: 70 U/L (ref 39–117)
ALT SERPL-CCNC: 25 U/L (ref 1–41)
AST SERPL-CCNC: 20 U/L (ref 1–40)
BILIRUB SERPL-MCNC: 0.4 MG/DL (ref 0.1–1.2)
BUN SERPL-MCNC: 15 MG/DL (ref 8–23)
BUN/CREAT SERPL: 11.4 (ref 7–25)
CALCIUM SERPL-MCNC: 9.6 MG/DL (ref 8.6–10.5)
CHLORIDE SERPL-SCNC: 103 MMOL/L (ref 98–107)
CHOLEST SERPL-MCNC: 117 MG/DL (ref 100–199)
CK SERPL-CCNC: 116 U/L (ref 20–200)
CO2 SERPL-SCNC: 26.4 MMOL/L (ref 22–29)
CREAT SERPL-MCNC: 1.32 MG/DL (ref 0.76–1.27)
ERYTHROCYTE [DISTWIDTH] IN BLOOD BY AUTOMATED COUNT: 13.2 % (ref 11.5–14.5)
GLOBULIN SER CALC-MCNC: 2 GM/DL
GLUCOSE SERPL-MCNC: 120 MG/DL (ref 65–99)
HBA1C MFR BLD: 6.36 % (ref 4.8–5.6)
HCT VFR BLD AUTO: 43.1 % (ref 40.4–52.2)
HDL SERPL-SCNC: 31.2 UMOL/L
HDLC SERPL-MCNC: 34 MG/DL
HGB BLD-MCNC: 14.1 G/DL (ref 13.7–17.6)
LDL SERPL QN: 19.6 NM
LDL SERPL-SCNC: 745 NMOL/L
LDL SMALL SERPL-SCNC: 627 NMOL/L
LDLC SERPL CALC-MCNC: 31 MG/DL (ref 0–99)
MCH RBC QN AUTO: 31.3 PG (ref 27–32.7)
MCHC RBC AUTO-ENTMCNC: 32.7 G/DL (ref 32.6–36.4)
MCV RBC AUTO: 95.6 FL (ref 79.8–96.2)
PLATELET # BLD AUTO: 227 10*3/MM3 (ref 140–500)
POTASSIUM SERPL-SCNC: 4.8 MMOL/L (ref 3.5–5.2)
PROT SERPL-MCNC: 6.3 G/DL (ref 6–8.5)
RBC # BLD AUTO: 4.51 10*6/MM3 (ref 4.6–6)
SODIUM SERPL-SCNC: 143 MMOL/L (ref 136–145)
T3FREE SERPL-MCNC: 2.8 PG/ML (ref 2–4.4)
T4 FREE SERPL-MCNC: 1.3 NG/DL (ref 0.93–1.7)
TRIGL SERPL-MCNC: 260 MG/DL (ref 0–149)
TSH SERPL DL<=0.005 MIU/L-ACNC: 9.86 MIU/ML (ref 0.27–4.2)
WBC # BLD AUTO: 6.21 10*3/MM3 (ref 4.5–10.7)

## 2018-08-14 ENCOUNTER — OFFICE VISIT (OUTPATIENT)
Dept: INTERNAL MEDICINE | Facility: CLINIC | Age: 68
End: 2018-08-14

## 2018-08-14 VITALS
SYSTOLIC BLOOD PRESSURE: 120 MMHG | WEIGHT: 240 LBS | BODY MASS INDEX: 36.37 KG/M2 | DIASTOLIC BLOOD PRESSURE: 70 MMHG | HEART RATE: 80 BPM | HEIGHT: 68 IN | OXYGEN SATURATION: 98 %

## 2018-08-14 DIAGNOSIS — Z91.09 MULTIPLE ENVIRONMENTAL ALLERGIES: Chronic | ICD-10-CM

## 2018-08-14 DIAGNOSIS — Z51.81 THERAPEUTIC DRUG MONITORING: ICD-10-CM

## 2018-08-14 DIAGNOSIS — E03.9 HYPOTHYROIDISM, UNSPECIFIED TYPE: Chronic | ICD-10-CM

## 2018-08-14 DIAGNOSIS — G47.33 OBSTRUCTIVE SLEEP APNEA: Chronic | ICD-10-CM

## 2018-08-14 DIAGNOSIS — E55.9 VITAMIN D DEFICIENCY: Chronic | ICD-10-CM

## 2018-08-14 DIAGNOSIS — E78.5 HYPERLIPIDEMIA, UNSPECIFIED HYPERLIPIDEMIA TYPE: Chronic | ICD-10-CM

## 2018-08-14 DIAGNOSIS — R73.01 IMPAIRED FASTING GLUCOSE: Chronic | ICD-10-CM

## 2018-08-14 DIAGNOSIS — Z00.00 MEDICARE ANNUAL WELLNESS VISIT, SUBSEQUENT: Primary | ICD-10-CM

## 2018-08-14 DIAGNOSIS — Z86.010 HISTORY OF COLON POLYPS: Chronic | ICD-10-CM

## 2018-08-14 DIAGNOSIS — N40.1 BENIGN NON-NODULAR PROSTATIC HYPERPLASIA WITH LOWER URINARY TRACT SYMPTOMS: Chronic | ICD-10-CM

## 2018-08-14 DIAGNOSIS — E29.1 HYPOGONADISM MALE: Chronic | ICD-10-CM

## 2018-08-14 DIAGNOSIS — K21.9 GASTROESOPHAGEAL REFLUX DISEASE WITHOUT ESOPHAGITIS: Chronic | ICD-10-CM

## 2018-08-14 DIAGNOSIS — Z00.00 ROUTINE PHYSICAL EXAMINATION: ICD-10-CM

## 2018-08-14 PROBLEM — M75.81 RIGHT ROTATOR CUFF TENDINITIS: Status: RESOLVED | Noted: 2017-07-11 | Resolved: 2018-08-14

## 2018-08-14 PROBLEM — H65.02 ACUTE SEROUS OTITIS MEDIA OF LEFT EAR: Status: RESOLVED | Noted: 2018-02-07 | Resolved: 2018-08-14

## 2018-08-14 PROBLEM — M75.21 BICIPITAL TENDINITIS, RIGHT SHOULDER: Status: RESOLVED | Noted: 2017-07-11 | Resolved: 2018-08-14

## 2018-08-14 PROCEDURE — 96160 PT-FOCUSED HLTH RISK ASSMT: CPT | Performed by: INTERNAL MEDICINE

## 2018-08-14 PROCEDURE — G0439 PPPS, SUBSEQ VISIT: HCPCS | Performed by: INTERNAL MEDICINE

## 2018-08-14 PROCEDURE — 99214 OFFICE O/P EST MOD 30 MIN: CPT | Performed by: INTERNAL MEDICINE

## 2018-08-14 RX ORDER — LEVOTHYROXINE SODIUM 0.15 MG/1
TABLET ORAL
Qty: 90 TABLET | Refills: 3 | Status: SHIPPED | OUTPATIENT
Start: 2018-08-14 | End: 2019-09-23 | Stop reason: SDUPTHER

## 2018-08-14 RX ORDER — TESTOSTERONE 16.2 MG/G
GEL TRANSDERMAL
Qty: 75 G | Refills: 3 | Status: SHIPPED | OUTPATIENT
Start: 2018-08-14 | End: 2018-08-17 | Stop reason: SDUPTHER

## 2018-08-14 RX ORDER — OXCARBAZEPINE 300 MG/1
1 TABLET, FILM COATED ORAL DAILY
COMMUNITY
Start: 2018-07-11 | End: 2019-01-30 | Stop reason: SDUPTHER

## 2018-08-14 NOTE — PROGRESS NOTES
QUICK REFERENCE INFORMATION:  The ABCs of the Annual Wellness Visit    Subsequent Medicare Wellness Visit    HEALTH RISK ASSESSMENT    1950    Recent Hospitalizations:  No hospitalization(s) within the last year..        Current Medical Providers:  Patient Care Team:  Marco Kenney MD as PCP - General (Internal Medicine)        Smoking Status:  History   Smoking Status   • Former Smoker   Smokeless Tobacco   • Never Used     Comment: Stopped smoking at age 35.       Alcohol Consumption:  History   Alcohol Use   • Yes     Comment: Moderately       Depression Screen:   PHQ-2/PHQ-9 Depression Screening 8/14/2018   Little interest or pleasure in doing things 0   Feeling down, depressed, or hopeless 0   Trouble falling or staying asleep, or sleeping too much -   Feeling tired or having little energy -   Poor appetite or overeating -   Feeling bad about yourself - or that you are a failure or have let yourself or your family down -   Trouble concentrating on things, such as reading the newspaper or watching television -   Moving or speaking so slowly that other people could have noticed. Or the opposite - being so fidgety or restless that you have been moving around a lot more than usual -   Thoughts that you would be better off dead, or of hurting yourself in some way -   Total Score 0   If you checked off any problems, how difficult have these problems made it for you to do your work, take care of things at home, or get along with other people? -       Health Habits and Functional and Cognitive Screening:  Functional & Cognitive Status 8/14/2018   Do you have difficulty preparing food and eating? No   Do you have difficulty bathing yourself, getting dressed or grooming yourself? No   Do you have difficulty using the toilet? No   Do you have difficulty moving around from place to place? No   Do you have trouble with steps or getting out of a bed or a chair? No   In the past year have you fallen or experienced a  near fall? No   Current Diet Well Balanced Diet   Dental Exam Up to date   Eye Exam Up to date   Exercise (times per week) 5 times per week   Current Exercise Activities Include (No Data)   Do you need help using the phone?  No   Are you deaf or do you have serious difficulty hearing?  No   Do you need help with transportation? No   Do you need help shopping? No   Do you need help preparing meals?  No   Do you need help with housework?  No   Do you need help with laundry? No   Do you need help taking your medications? No   Do you need help managing money? No   Do you ever drive or ride in a car without wearing a seat belt? No   Have you felt unusual stress, anger or loneliness in the last month? No   Who do you live with? Other   If you need help, do you have trouble finding someone available to you? No   Have you been bothered in the last four weeks by sexual problems? No   Do you have difficulty concentrating, remembering or making decisions? No           Does the patient have evidence of cognitive impairment? No    Aspirin use counseling: Taking ASA appropriately as indicated      Recent Lab Results:  CMP:  Lab Results   Component Value Date     (H) 07/31/2018    BUN 15 07/31/2018    CREATININE 1.32 (H) 07/31/2018    EGFRIFNONA 54 (L) 07/31/2018    EGFRIFAFRI 65 07/31/2018    BCR 11.4 07/31/2018     07/31/2018    K 4.8 07/31/2018    CO2 26.4 07/31/2018    CALCIUM 9.6 07/31/2018    PROTENTOTREF 6.3 07/31/2018    ALBUMIN 4.30 07/31/2018    LABGLOBREF 2.0 07/31/2018    LABIL2 2.2 07/31/2018    BILITOT 0.4 07/31/2018    ALKPHOS 70 07/31/2018    AST 20 07/31/2018    ALT 25 07/31/2018     Lipid Panel:  Lab Results   Component Value Date    TRIG 260 (H) 07/31/2018     HbA1c:  Lab Results   Component Value Date    HGBA1C 6.36 (H) 07/31/2018       Visual Acuity:  No exam data present    Age-appropriate Screening Schedule:  Refer to the list below for future screening recommendations based on patient's age,  sex and/or medical conditions. Orders for these recommended tests are listed in the plan section. The patient has been provided with a written plan.    Health Maintenance   Topic Date Due   • INFLUENZA VACCINE  08/01/2018   • LIPID PANEL  07/31/2019   • TDAP/TD VACCINES (2 - Td) 10/18/2023   • COLONOSCOPY  01/14/2026   • PNEUMOCOCCAL VACCINES (65+ LOW/MEDIUM RISK)  Completed   • ZOSTER VACCINE  Excluded        Subjective   History of Present Illness    Nilson Buchanan is a 68 y.o. male who presents for an Subsequent Wellness Visit.    The following portions of the patient's history were reviewed and updated as appropriate: allergies, current medications, past family history, past medical history, past social history, past surgical history and problem list.    Outpatient Medications Prior to Visit   Medication Sig Dispense Refill   • Cholecalciferol (VITAMIN D3) 5000 UNITS capsule capsule Take 1 capsule by mouth daily.     • ezetimibe (ZETIA) 10 MG tablet TAKE 1 TABLET DAILY 90 tablet 1   • fluticasone (FLONASE) 50 MCG/ACT nasal spray PLACE 2 SPRAYS IN EACH NOSTRIL DAILY 3 bottle 9   • simvastatin (ZOCOR) 40 MG tablet Take 1 tablet by mouth Every Night. 90 tablet 0   • levothyroxine (SYNTHROID, LEVOTHROID) 125 MCG tablet TAKE ONE TABLET BY MOUTH DAILY 90 tablet 0   • aspirin 81 MG tablet Take 1 tablet by mouth Daily.     • Chlorcyclizine-Pseudoephed (STAHIST AD) 25-60 MG tablet 1 by mouth every 6 hours, not greater than 3 in 24 hours 60 tablet 2   • predniSONE (DELTASONE) 10 MG tablet 5 by mouth daily 5 days, then 4 daily 2 days, 3 daily 2 days, 2 daily 2 days, 1 daily 2 days, one half daily 2 days; D/C 48 tablet 0   • simvastatin (ZOCOR) 40 MG tablet TAKE 1 TABLET EVERY NIGHT (NEED LABS AND FOLLOW UP AS SOON AS POSSIBLE) 90 tablet 1     No facility-administered medications prior to visit.        Patient Active Problem List   Diagnosis   • Allergic rhinitis   • History of colon polyps, 01/14/2016--normal study.  2010--serrated adenoma ×1.     • Gastroesophageal reflux disease without esophagitis   • Hyperlipidemia   • Hypogonadism male, 08/14/2018--TRT initiated.   • Hypothyroidism   • Impaired fasting glucose   • Male erectile disorder   • Multiple environmental allergies   • Obstructive sleep apnea, 09/08/2005--AHI 18.2.  Oxygen saturation 81%.  Patient cannot tolerate CPAP.   • Vitamin D deficiency   • Therapeutic drug monitoring   • Benign prostatic hypertrophy   • Routine physical examination       Advance Care Planning:  has an advance directive - a copy has been provided and is in file    Identification of Risk Factors:  Risk factors include: weight  and cardiovascular risk.    Review of Systems   Musculoskeletal: Positive for arthralgias.   All other systems reviewed and are negative.      Compared to one year ago, the patient feels his physical health is the same.  Compared to one year ago, the patient feels his mental health is the same.    Objective       Physical Exam    General: Alert and oriented x 3.  No acute distress. Obese. Normal affect.  HEENT: Pupils equal, round, reactive to light; extraocular movements intact; sclerae nonicteric; pharynx, ear canals and TMs normal.  Neck: Without JVD, thyromegaly, bruit, or adenopathy.  Lungs: Clear to auscultation in all fields.  Heart: Regular rate and rhythm without murmur, rub, gallop, or click.  Abdomen: Soft, nontender, without hepatosplenomegaly or hernia.  Bowel sounds normal.  : Deferred.  Rectal: Deferred.  Extremities: Without clubbing, cyanosis, edema, or pulse deficit.  Neurologic: Intact without focal deficit.  Normal station and gait observed during ingress and egress from the examination room.  Skin: Without significant lesion.  Musculoskeletal: Unremarkable.    Vitals:    08/14/18 1123   BP: 120/70   BP Location: Right arm   Patient Position: Sitting   Cuff Size: Large Adult   Pulse: 80   SpO2: 98%   Weight: 109 kg (240 lb)   Height: 172.7 cm  "(67.99\")   PainSc: 0-No pain       Patient's Body mass index is 36.5 kg/m². BMI is above normal parameters. Recommendations include: exercise counseling, nutrition counseling and referral to primary care.      Assessment/Plan   Patient Self-Management and Personalized Health Advice  The patient has been provided with information about: diet, exercise, weight management, prevention of cardiac or vascular disease and fall prevention and preventive services including:   · Counseling for cardiovascular disease risk reduction, Diabetes screening, see lab orders, Exercise counseling provided, Fall Risk assessment done, Glaucoma screening recommended, Nutrition counseling provided, Prostate cancer screening discussed, Zostavax vaccine (Herpes Zoster).    Visit Diagnoses:    ICD-10-CM ICD-9-CM   1. Medicare annual wellness visit, subsequent Z00.00 V70.0   2. Routine physical examination Z00.00 V70.0   3. Impaired fasting glucose R73.01 790.21   4. Hyperlipidemia, unspecified hyperlipidemia type E78.5 272.4   5. History of colon polyps, 01/14/2016--normal study. 2010--serrated adenoma ×1.   Z86.010 V12.72   6. Gastroesophageal reflux disease without esophagitis K21.9 530.81   7. Hypothyroidism, unspecified type E03.9 244.9   8. Multiple environmental allergies Z91.09 V15.09   9. Obstructive sleep apnea, 09/08/2005--AHI 18.2.  Oxygen saturation 81%.  Patient cannot tolerate CPAP. G47.33 327.23   10. Benign prostatic hypertrophy N40.1 600.91   11. Vitamin D deficiency E55.9 268.9   12. Hypogonadism male, patient elects no TRT. E29.1 257.2   13. Therapeutic drug monitoring Z51.81 V58.83       Orders Placed This Encounter   Procedures   • TSH     Standing Status:   Future     Standing Expiration Date:   8/14/2020   • T4, Free     Standing Status:   Future     Standing Expiration Date:   8/14/2020   • T3, Free     Standing Status:   Future     Standing Expiration Date:   8/14/2020   • Testosterone,Free+Weakly Bound     Standing " Status:   Future     Standing Expiration Date:   8/14/2020   • PSA DIAGNOSTIC     Standing Status:   Future     Standing Expiration Date:   10/2/2020       Outpatient Encounter Prescriptions as of 8/14/2018   Medication Sig Dispense Refill   • Cholecalciferol (VITAMIN D3) 5000 UNITS capsule capsule Take 1 capsule by mouth daily.     • ezetimibe (ZETIA) 10 MG tablet TAKE 1 TABLET DAILY 90 tablet 1   • fluticasone (FLONASE) 50 MCG/ACT nasal spray PLACE 2 SPRAYS IN EACH NOSTRIL DAILY 3 bottle 9   • OXcarbazepine (TRILEPTAL) 300 MG tablet 1 tablet Daily.     • simvastatin (ZOCOR) 40 MG tablet Take 1 tablet by mouth Every Night. 90 tablet 0   • [DISCONTINUED] levothyroxine (SYNTHROID, LEVOTHROID) 125 MCG tablet TAKE ONE TABLET BY MOUTH DAILY 90 tablet 0   • aspirin 81 MG tablet Take 1 tablet by mouth Daily.     • levothyroxine (SYNTHROID) 150 MCG tablet One by mouth daily for low thyroid 90 tablet 3   • Testosterone (ANDROGEL PUMP) 20.25 MG/ACT (1.62%) gel Apply a total of 2 pumps daily as directed 75 g 3   • [DISCONTINUED] Chlorcyclizine-Pseudoephed (STAHIST AD) 25-60 MG tablet 1 by mouth every 6 hours, not greater than 3 in 24 hours 60 tablet 2   • [DISCONTINUED] predniSONE (DELTASONE) 10 MG tablet 5 by mouth daily 5 days, then 4 daily 2 days, 3 daily 2 days, 2 daily 2 days, 1 daily 2 days, one half daily 2 days; D/C 48 tablet 0   • [DISCONTINUED] simvastatin (ZOCOR) 40 MG tablet TAKE 1 TABLET EVERY NIGHT (NEED LABS AND FOLLOW UP AS SOON AS POSSIBLE) 90 tablet 1     No facility-administered encounter medications on file as of 8/14/2018.        Reviewed use of high risk medication in the elderly: yes  Reviewed for potential of harmful drug interactions in the elderly: yes    Follow Up:  Return in about 7 weeks (around 10/2/2018) for Next scheduled follow up with lab prior.     An After Visit Summary and PPPS with all of these plans were given to the patient.

## 2018-08-14 NOTE — PROGRESS NOTES
08/14/2018    Patient Information  Nilson Buchanan                                                                                          [address] [facility] [address]      1950  [phone]      Chief Complaint:     Subsequent Medicare wellness visit.  [employer] replacement annual physical exam.  Follow-up impaired fasting glucose, hyperlipidemia, history of colon polyps, esophageal reflux, hypothyroidism, sleep apnea, BPH, vitamin D deficiency and hypogonadism.  No new acute complaints.    History of Present Illness:    Patient with a history of medical problems as outlined in the chief complaint of been fairly stable for the past year presents today for his subsequent Medicare wellness visit and his annual physical.  Patient also had lab work in order to monitor his chronic medical issues.  His past medical history reviewed and updated where necessary including health maintenance parameters.  This reveals he will be up-to-date after today's visit with the exception of the new shingles vaccination.  I encouraged him to check with the local drug store regarding coverage.    Review of Systems   Constitution: Negative.   HENT: Negative.    Eyes: Negative.    Cardiovascular: Negative.    Respiratory: Negative.    Endocrine: Negative.    Hematologic/Lymphatic: Negative.    Skin: Negative.    Musculoskeletal: Negative.    Gastrointestinal: Negative.    Genitourinary: Negative.    Neurological: Negative.    Psychiatric/Behavioral: Negative.    Allergic/Immunologic: Negative.        Active Problems:    Patient Active Problem List   Diagnosis   • Allergic rhinitis   • History of colon polyps, 01/14/2016--normal study. 2010--serrated adenoma ×1.     • Gastroesophageal reflux disease without esophagitis   • Hyperlipidemia   • Hypogonadism male, patient elects no TRT.   • Hypothyroidism   • Impaired fasting glucose   • Male erectile disorder   • Multiple environmental allergies   •  Obstructive sleep apnea, 09/08/2005--AHI 18.2.  Oxygen saturation 81%.  Patient cannot tolerate CPAP.   • Vitamin D deficiency   • Therapeutic drug monitoring   • Benign prostatic hypertrophy   • Routine physical examination         Past Medical History:   Diagnosis Date   • Allergic rhinitis 2/21/2014    Patient reports he had allergy testing several years ago in his mid 20s. He reports allergies to molds. He was on immunotherapy for approximately one year and then this was discontinued. He was told he did not need it.   02/21/2014--Flonase 2 sprays each nostril daily initiated.   • Benign prostatic hypertrophy 2/9/2017 02/29/2017--patient reports sudden onset a few days ago with dysuria and BPH obstructive symptoms.  He had intercourse recently and after that begin to have burning with urination which has continued.  He subsequently developed intermittent obstructive symptoms consisting of urgency, weak stream, dribbling and sensation of incomplete voiding.  Digital rectal examination avoided today.  Urinalysis, urine culture, PSA ordered.  Empiric Cipro 500 mg by mouth twice a day ×30 days, Flomax 0.4 mg by mouth daily.  I will have patient follow-up after completion of antibiotics with a PSA 2 days after completion of the antibiotics providing his PSA comes back elevated or above baseline.   • Gastroesophageal reflux disease without esophagitis 4/29/2016   • History of acute prostatitis 2/9/2017    03/10/2017--patient seen in follow-up and his symptoms resolved.  PSA is 0.630.  PSA at the initiation of treatment was 1.03.  Urinalysis and urine culture returned negative.  02/29/2017--patient reports sudden onset a few days ago with dysuria and BPH obstructive symptoms.  He had intercourse recently and after that begin to have burning with urination which has continued.  He subsequently developed intermittent obstructive symptoms consisting of urgency, weak stream, dribbling and sensation of incomplete voiding.   Digital rectal examination avoided today.  Urinalysis, urine culture, PSA ordered.  Empiric Cipro 500 mg by mouth twice a day ×30 days, Flomax 0.4 mg by mouth daily.  I will have patient follow-up after completion of antibiotics with a PSA 2 days after completion of the antibiotics providing his PSA comes back elevated or above baseline.   • History of BPPV (benign paroxysmal positional vertigo) 6/20/2016 12/19/2016--patient reports symptoms have resolved.  06/20/2016--patient presents with a new complaint of a several week history of intermittent episodes of dizziness described as a spinning sensation.  It is always associated with abrupt standing for lying down to go to bed.  Only lasts for a very short period of time and there is no other associated symptoms.  He does not feel the symptoms are bad enough at the present time to warrant vestibular therapy.   • History of colon polyps, 01/14/2016--normal study. 2010--serrated adenoma ×1.   5/10/2010    01/14/2016--normal colonoscopy.  Recommended repeat study in 10 years.  05/10/2010--colonoscopy revealed a 3 mm cecal polyp that was removed completely. The remainder of the colonoscopy was normal. Recommend repeat colonoscopy in 5 years. If the polyp is adenomatous, would recommend all first-degree relatives over age 40 be in screening as well. Pathology returned serrated adenoma.   • History of pneumonia 12/06/2014 12/06/2014--patient seen in follow up and reports he is definitely better but he is still having a cough that seems to come and go. This been no fever. He still has some shortness of breath but this is better. Lung examination reveals continued bibasilar crackles but they are somewhat improved over previous. I reviewed the chest x-ray results and even though the chest x-ray shows no definite pneum   • History of Thoracic compression fracture 03/28/2004 03/28/2004--patient fell and suffered a moderate T12 compression fracture. No subluxation or  retropulsion of fracture fragments is seen. Approximately 40% loss of anterior vertebral body height.   • Hyperlipidemia 4/29/2016   • Hypogonadism male, patient elects no TRT. 6/10/2013    02/21/2014--treatment for hypothyroidism discontinued. Patient did not feel any better with the testosterone. It also is extremely expensive.  06/10/2013--treatment for hypogonadism begun.      • Hypothyroidism 4/29/2016   • Impaired fasting glucose 4/29/2016   • Male erectile disorder 11/23/2015 11/23/2015--patient presents with complaints of erectile dysfunction. He does have borderline hypogonadism but previous treatment was not particularly effective. I gave him samples of Stendra, Viagra, Staxyn.   • Multiple environmental allergies 4/29/2016    Patient reports he had allergy testing several years ago in his mid 20s. He reports allergies to molds. He was on immunotherapy for approximately one year and then this was discontinued. He was told he did not need it.   • Obstructive sleep apnea, 09/08/2005--AHI 18.2.  Oxygen saturation 81%.  Patient cannot tolerate CPAP. 9/8/2005 09/08/2005--sleep study revealed an apnea/hypopnea index for total sleep time to be moderately abnormal at 18.2. Lowest oxygen saturation 81%. CPAP was tried but patient is intolerant.   • Vitamin D deficiency 4/29/2016         Past Surgical History:   Procedure Laterality Date   • ANKLE SURGERY Left 2004 2004--repair of left ankle fracture with hardware placement.   • CARPAL TUNNEL RELEASE Right 1989 1989--right carpal tunnel release.   • COLONOSCOPY  05/10/2010    05/10/2010--colonoscopy revealed a 3 mm cecal polyp that was removed completely. The remainder of the colonoscopy was normal. Recommend repeat colonoscopy in 5 years. If the polyp is adenomatous, would recommend all first-degree relatives over age 40 be in screening as well. Pathology returned serrated adenoma.   • COLONOSCOPY  01/14/2016 01/14/2016--normal colonoscopy.   "Recommended repeat study in 10 years.   • LAPAROSCOPIC CHOLECYSTECTOMY  2012--laparoscopic cholecystectomy. Pathology returned cholesterolosis.   • TONSILLECTOMY  Childhood    Childhood tonsillectomy         No Known Allergies        Current Outpatient Prescriptions:   •  Cholecalciferol (VITAMIN D3) 5000 UNITS capsule capsule, Take 1 capsule by mouth daily., Disp: , Rfl:   •  ezetimibe (ZETIA) 10 MG tablet, TAKE 1 TABLET DAILY, Disp: 90 tablet, Rfl: 1  •  fluticasone (FLONASE) 50 MCG/ACT nasal spray, PLACE 2 SPRAYS IN EACH NOSTRIL DAILY, Disp: 3 bottle, Rfl: 9  •  levothyroxine (SYNTHROID, LEVOTHROID) 125 MCG tablet, TAKE ONE TABLET BY MOUTH DAILY, Disp: 90 tablet, Rfl: 0  •  OXcarbazepine (TRILEPTAL) 300 MG tablet, 1 tablet Daily., Disp: , Rfl:   •  simvastatin (ZOCOR) 40 MG tablet, Take 1 tablet by mouth Every Night., Disp: 90 tablet, Rfl: 0  •  aspirin 81 MG tablet, Take 1 tablet by mouth Daily., Disp: , Rfl:       Family History   Problem Relation Age of Onset   • Cancer Father         Liver Cancer. Father  from liver cancer.   • Heart attack Brother         Acute Myocardial Infarction.  Brother had a myocardial infarction at age 68.         Social History     Social History   • Marital status:      Spouse name: N/A   • Number of children: N/A   • Years of education: N/A     Occupational History   • Retired - [employer]      Social History Main Topics   • Smoking status: Former Smoker   • Smokeless tobacco: Never Used      Comment: Stopped smoking at age 35.   • Alcohol use Yes      Comment: Moderately   • Drug use: No   • Sexual activity: Yes     Partners: Female     Other Topics Concern   • Not on file     Social History Narrative   • No narrative on file         Vitals:    18 1123   BP: 120/70   BP Location: Right arm   Patient Position: Sitting   Cuff Size: Large Adult   Pulse: 80   SpO2: 98%   Weight: 109 kg (240 lb)   Height: 172.7 cm (67.99\")          Physical " Exam:    General: Alert and oriented x 3.  No acute distress.  Normal affect.  HEENT: Pupils equal, round, reactive to light; extraocular movements intact; sclerae nonicteric; pharynx, ear canals and TMs normal.  Neck: Without JVD, thyromegaly, bruit, or adenopathy.  Lungs: Clear to auscultation in all fields.  Heart: Regular rate and rhythm without murmur, rub, gallop, or click.  Abdomen: Soft, nontender, without hepatosplenomegaly or hernia.  Bowel sounds normal.  : Deferred.  Rectal: Deferred.  Extremities: Without clubbing, cyanosis, edema, or pulse deficit.  Neurologic: Intact without focal deficit.  Normal station and gait observed during ingress and egress from the examination room.  Skin: Without significant lesion.  Musculoskeletal: Unremarkable.      Lab/other results:    NMR is perfect other than triglycerides elevated at 260 and small LDL particle number mildly elevated at 627.  Total cholesterol only 117 and LDL particle number excellent at 745.  HDL particle number is normal at 31.2.  CMP normal except blood sugar 120 and creatinine slightly elevated at 1.32.  CBC normal.  Hemoglobin A1c 6.36.  TSH is elevated at 9.86.  CPK normal.    Assessment/Plan:     Diagnosis Plan   1. Medicare annual wellness visit, subsequent     2. Routine physical examination     3. Impaired fasting glucose     4. Hyperlipidemia, unspecified hyperlipidemia type     5. History of colon polyps, 01/14/2016--normal study. 2010--serrated adenoma ×1.       6. Gastroesophageal reflux disease without esophagitis     7. Hypothyroidism, unspecified type     8. Multiple environmental allergies     9. Obstructive sleep apnea, 09/08/2005--AHI 18.2.  Oxygen saturation 81%.  Patient cannot tolerate CPAP.     10. Benign prostatic hypertrophy     11. Vitamin D deficiency     12. Hypogonadism male, patient elects no TRT.     13. Therapeutic drug monitoring       The subsequent Medicare wellness visit is documented on a separate  note.    Patient presents with essentially normal annual [employer] replacement physical except for the following issues: He has impaired fasting glucose and is nearing mild overt diabetes.  Strongly recommend decrease carbohydrate intake, diet, exercise and weight loss.  Hyperlipidemia is under reasonable control.  Patient has a history of colon polyps and had a normal colonoscopy less than 3 years ago.  Reflux currently not an issue.  He has hypothyroidism and is subtherapeutic.  Dose adjustment indicated.  He has sleep apnea but cannot tolerate CPAP.  BPH is asymptomatic.  Vitamin D is therapeutic.  Patient elected not to treat hypogonadism.    Plan is as follows: Increase levothyroxine to a total 150 µg per day.  Patient will work on diet and weight loss.  I will have him follow-up in about 4 months with lab prior.    Addendum: In regards to the hypogonadism, patient has not been on testosterone replacement therapy because he was concerned about transference 2 small children who are no longer in the household.  We discussed considering testosterone replacement therapy and I do think that this would help with his weight loss and help improve his diabetes.  We will start AndroGel 1.65%, two pumps daily as directed.  I will have patient obtain fasting lab work and follow-up in about 6-8 weeks.  We will not schedule lab in follow-up in 4 months as noted above.  We will schedule later.    Procedures

## 2018-08-17 DIAGNOSIS — E29.1 HYPOGONADISM MALE: Chronic | ICD-10-CM

## 2018-08-17 RX ORDER — TESTOSTERONE 16.2 MG/G
GEL TRANSDERMAL
Qty: 75 G | Refills: 5 | OUTPATIENT
Start: 2018-08-17 | End: 2019-08-06 | Stop reason: SDUPTHER

## 2018-09-21 ENCOUNTER — HOSPITAL ENCOUNTER (EMERGENCY)
Facility: HOSPITAL | Age: 68
Discharge: HOME | End: 2018-09-22
Attending: EMERGENCY MEDICINE | Admitting: EMERGENCY MEDICINE

## 2018-09-21 DIAGNOSIS — K52.9 COLITIS: Primary | ICD-10-CM

## 2018-09-21 PROCEDURE — 83690 ASSAY OF LIPASE: CPT | Performed by: EMERGENCY MEDICINE

## 2018-09-21 PROCEDURE — 25010000002 MORPHINE PER 10 MG: Performed by: EMERGENCY MEDICINE

## 2018-09-21 PROCEDURE — 80053 COMPREHEN METABOLIC PANEL: CPT | Performed by: EMERGENCY MEDICINE

## 2018-09-21 PROCEDURE — 93005 ELECTROCARDIOGRAM TRACING: CPT | Performed by: EMERGENCY MEDICINE

## 2018-09-21 PROCEDURE — 96361 HYDRATE IV INFUSION ADD-ON: CPT

## 2018-09-21 PROCEDURE — 93010 ELECTROCARDIOGRAM REPORT: CPT | Performed by: INTERNAL MEDICINE

## 2018-09-21 PROCEDURE — 96375 TX/PRO/DX INJ NEW DRUG ADDON: CPT

## 2018-09-21 PROCEDURE — 85025 COMPLETE CBC W/AUTO DIFF WBC: CPT | Performed by: EMERGENCY MEDICINE

## 2018-09-21 PROCEDURE — 25010000002 ONDANSETRON PER 1 MG: Performed by: EMERGENCY MEDICINE

## 2018-09-21 PROCEDURE — 36415 COLL VENOUS BLD VENIPUNCTURE: CPT

## 2018-09-21 PROCEDURE — 99284 EMERGENCY DEPT VISIT MOD MDM: CPT

## 2018-09-21 PROCEDURE — 96374 THER/PROPH/DIAG INJ IV PUSH: CPT

## 2018-09-21 RX ORDER — SODIUM CHLORIDE 9 MG/ML
125 INJECTION, SOLUTION INTRAVENOUS CONTINUOUS
Status: DISCONTINUED | OUTPATIENT
Start: 2018-09-21 | End: 2018-09-22 | Stop reason: HOSPADM

## 2018-09-21 RX ORDER — SODIUM CHLORIDE 0.9 % (FLUSH) 0.9 %
10 SYRINGE (ML) INJECTION AS NEEDED
Status: DISCONTINUED | OUTPATIENT
Start: 2018-09-21 | End: 2018-09-22 | Stop reason: HOSPADM

## 2018-09-21 RX ORDER — ONDANSETRON 2 MG/ML
4 INJECTION INTRAMUSCULAR; INTRAVENOUS ONCE
Status: COMPLETED | OUTPATIENT
Start: 2018-09-21 | End: 2018-09-21

## 2018-09-21 RX ADMIN — SODIUM CHLORIDE 1000 ML: 9 INJECTION, SOLUTION INTRAVENOUS at 23:56

## 2018-09-21 RX ADMIN — MORPHINE SULFATE 4 MG: 4 INJECTION INTRAVENOUS at 23:56

## 2018-09-21 RX ADMIN — ONDANSETRON 4 MG: 2 INJECTION INTRAMUSCULAR; INTRAVENOUS at 23:56

## 2018-09-22 ENCOUNTER — APPOINTMENT (OUTPATIENT)
Dept: CT IMAGING | Facility: HOSPITAL | Age: 68
End: 2018-09-22

## 2018-09-22 VITALS
OXYGEN SATURATION: 94 % | RESPIRATION RATE: 20 BRPM | SYSTOLIC BLOOD PRESSURE: 126 MMHG | WEIGHT: 230 LBS | DIASTOLIC BLOOD PRESSURE: 73 MMHG | BODY MASS INDEX: 34.86 KG/M2 | HEIGHT: 68 IN | HEART RATE: 101 BPM | TEMPERATURE: 102.1 F

## 2018-09-22 LAB
ALBUMIN SERPL-MCNC: 4.4 G/DL (ref 3.5–5.2)
ALBUMIN/GLOB SERPL: 1.5 G/DL
ALP SERPL-CCNC: 77 U/L (ref 39–117)
ALT SERPL W P-5'-P-CCNC: 24 U/L (ref 1–41)
ANION GAP SERPL CALCULATED.3IONS-SCNC: 11.3 MMOL/L
AST SERPL-CCNC: 20 U/L (ref 1–40)
BASOPHILS # BLD AUTO: 0.02 10*3/MM3 (ref 0–0.2)
BASOPHILS NFR BLD AUTO: 0.2 % (ref 0–1.5)
BILIRUB SERPL-MCNC: 0.6 MG/DL (ref 0.1–1.2)
BUN BLD-MCNC: 13 MG/DL (ref 8–23)
BUN/CREAT SERPL: 9 (ref 7–25)
CALCIUM SPEC-SCNC: 9.4 MG/DL (ref 8.6–10.5)
CHLORIDE SERPL-SCNC: 98 MMOL/L (ref 98–107)
CO2 SERPL-SCNC: 26.7 MMOL/L (ref 22–29)
CREAT BLD-MCNC: 1.45 MG/DL (ref 0.76–1.27)
DEPRECATED RDW RBC AUTO: 46.1 FL (ref 37–54)
EOSINOPHIL # BLD AUTO: 0.06 10*3/MM3 (ref 0–0.7)
EOSINOPHIL NFR BLD AUTO: 0.6 % (ref 0.3–6.2)
ERYTHROCYTE [DISTWIDTH] IN BLOOD BY AUTOMATED COUNT: 13.3 % (ref 11.5–14.5)
GFR SERPL CREATININE-BSD FRML MDRD: 48 ML/MIN/1.73
GLOBULIN UR ELPH-MCNC: 2.9 GM/DL
GLUCOSE BLD-MCNC: 130 MG/DL (ref 65–99)
HCT VFR BLD AUTO: 44.1 % (ref 40.4–52.2)
HGB BLD-MCNC: 14.3 G/DL (ref 13.7–17.6)
IMM GRANULOCYTES # BLD: 0.02 10*3/MM3 (ref 0–0.03)
IMM GRANULOCYTES NFR BLD: 0.2 % (ref 0–0.5)
LIPASE SERPL-CCNC: 27 U/L (ref 13–60)
LYMPHOCYTES # BLD AUTO: 1.25 10*3/MM3 (ref 0.9–4.8)
LYMPHOCYTES NFR BLD AUTO: 12.6 % (ref 19.6–45.3)
MCH RBC QN AUTO: 31.2 PG (ref 27–32.7)
MCHC RBC AUTO-ENTMCNC: 32.4 G/DL (ref 32.6–36.4)
MCV RBC AUTO: 96.3 FL (ref 79.8–96.2)
MONOCYTES # BLD AUTO: 0.9 10*3/MM3 (ref 0.2–1.2)
MONOCYTES NFR BLD AUTO: 9.1 % (ref 5–12)
NEUTROPHILS # BLD AUTO: 7.66 10*3/MM3 (ref 1.9–8.1)
NEUTROPHILS NFR BLD AUTO: 77.3 % (ref 42.7–76)
PLATELET # BLD AUTO: 208 10*3/MM3 (ref 140–500)
PMV BLD AUTO: 9.8 FL (ref 6–12)
POTASSIUM BLD-SCNC: 3.5 MMOL/L (ref 3.5–5.2)
PROT SERPL-MCNC: 7.3 G/DL (ref 6–8.5)
RBC # BLD AUTO: 4.58 10*6/MM3 (ref 4.6–6)
SODIUM BLD-SCNC: 136 MMOL/L (ref 136–145)
WBC NRBC COR # BLD: 9.91 10*3/MM3 (ref 4.5–10.7)

## 2018-09-22 PROCEDURE — 74177 CT ABD & PELVIS W/CONTRAST: CPT

## 2018-09-22 PROCEDURE — 25010000002 IOPAMIDOL 61 % SOLUTION: Performed by: EMERGENCY MEDICINE

## 2018-09-22 RX ORDER — METRONIDAZOLE 500 MG/1
500 TABLET ORAL ONCE
Status: COMPLETED | OUTPATIENT
Start: 2018-09-22 | End: 2018-09-22

## 2018-09-22 RX ORDER — CIPROFLOXACIN 500 MG/1
500 TABLET, FILM COATED ORAL 2 TIMES DAILY
Qty: 20 TABLET | Refills: 0 | Status: SHIPPED | OUTPATIENT
Start: 2018-09-22 | End: 2018-10-02

## 2018-09-22 RX ORDER — LEVOFLOXACIN 500 MG/1
500 TABLET, FILM COATED ORAL ONCE
Status: COMPLETED | OUTPATIENT
Start: 2018-09-22 | End: 2018-09-22

## 2018-09-22 RX ORDER — METRONIDAZOLE 500 MG/1
500 TABLET ORAL 3 TIMES DAILY
Qty: 30 TABLET | Refills: 0 | Status: SHIPPED | OUTPATIENT
Start: 2018-09-22 | End: 2018-10-02

## 2018-09-22 RX ADMIN — IOPAMIDOL 85 ML: 612 INJECTION, SOLUTION INTRAVENOUS at 00:47

## 2018-09-22 RX ADMIN — LEVOFLOXACIN 500 MG: 500 TABLET, FILM COATED ORAL at 01:10

## 2018-09-22 RX ADMIN — METRONIDAZOLE 500 MG: 500 TABLET, FILM COATED ORAL at 01:10
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
## 2018-09-22 NOTE — ED NOTES
"Pt reports stomach pain that radiates to chest X2 days. States \"I have so much cramping going on in my stomach and it is making my chest hurt.\" Patient denies trouble breathing, reports pain 4/10. Skin pink, warm and dry, breathing even and unlabored. No acute distress noted, wife at bedside. Will continue to monitor.      Jessica Shell RN  09/22/18 0029    "

## 2018-09-22 NOTE — ED TRIAGE NOTES
Pt ambulatory to triage, c/o diarrhea for a few days, intermittent chest pain that started 1 hr pta, and chills since 5 pm. Report to PIERCE Pagan

## 2018-09-24 DIAGNOSIS — E03.9 HYPOTHYROIDISM, UNSPECIFIED TYPE: Chronic | ICD-10-CM

## 2018-09-24 DIAGNOSIS — N40.1 BENIGN NON-NODULAR PROSTATIC HYPERPLASIA WITH LOWER URINARY TRACT SYMPTOMS: Chronic | ICD-10-CM

## 2018-09-24 DIAGNOSIS — Z51.81 THERAPEUTIC DRUG MONITORING: ICD-10-CM

## 2018-09-28 LAB
PSA SERPL-MCNC: 0.66 NG/ML (ref 0–4)
T3FREE SERPL-MCNC: 2.1 PG/ML (ref 2–4.4)
T4 FREE SERPL-MCNC: 1.41 NG/DL (ref 0.93–1.7)
TESTOST SERPL-MCNC: 369 NG/DL (ref 264–916)
TESTOSTERONE.FREE+WB MFR SERPL: 15.9 % (ref 9–46)
TESTOSTERONE.FREE+WB SERPL-MCNC: 58.7 NG/DL (ref 40–250)
TSH SERPL DL<=0.005 MIU/L-ACNC: 7.48 MIU/ML (ref 0.27–4.2)

## 2018-10-02 ENCOUNTER — OFFICE VISIT (OUTPATIENT)
Dept: INTERNAL MEDICINE | Facility: CLINIC | Age: 68
End: 2018-10-02

## 2018-10-02 VITALS
SYSTOLIC BLOOD PRESSURE: 104 MMHG | WEIGHT: 230 LBS | BODY MASS INDEX: 34.86 KG/M2 | HEART RATE: 73 BPM | OXYGEN SATURATION: 98 % | DIASTOLIC BLOOD PRESSURE: 66 MMHG | HEIGHT: 68 IN

## 2018-10-02 DIAGNOSIS — Z23 NEED FOR INFLUENZA VACCINATION: ICD-10-CM

## 2018-10-02 DIAGNOSIS — R73.01 IMPAIRED FASTING GLUCOSE: Chronic | ICD-10-CM

## 2018-10-02 DIAGNOSIS — Z09 HOSPITAL DISCHARGE FOLLOW-UP: Primary | ICD-10-CM

## 2018-10-02 DIAGNOSIS — A09 INFECTIOUS COLITIS: ICD-10-CM

## 2018-10-02 DIAGNOSIS — E55.9 VITAMIN D DEFICIENCY: Chronic | ICD-10-CM

## 2018-10-02 DIAGNOSIS — E78.5 HYPERLIPIDEMIA, UNSPECIFIED HYPERLIPIDEMIA TYPE: Chronic | ICD-10-CM

## 2018-10-02 DIAGNOSIS — E03.9 HYPOTHYROIDISM, UNSPECIFIED TYPE: Chronic | ICD-10-CM

## 2018-10-02 DIAGNOSIS — N40.1 BENIGN NON-NODULAR PROSTATIC HYPERPLASIA WITH LOWER URINARY TRACT SYMPTOMS: Chronic | ICD-10-CM

## 2018-10-02 DIAGNOSIS — Z51.81 THERAPEUTIC DRUG MONITORING: ICD-10-CM

## 2018-10-02 DIAGNOSIS — E29.1 HYPOGONADISM MALE: Chronic | ICD-10-CM

## 2018-10-02 PROCEDURE — 90662 IIV NO PRSV INCREASED AG IM: CPT | Performed by: INTERNAL MEDICINE

## 2018-10-02 PROCEDURE — 99214 OFFICE O/P EST MOD 30 MIN: CPT | Performed by: INTERNAL MEDICINE

## 2018-10-02 PROCEDURE — G0008 ADMIN INFLUENZA VIRUS VAC: HCPCS | Performed by: INTERNAL MEDICINE

## 2018-10-02 NOTE — PROGRESS NOTES
10/02/2018    Patient Information  Nilson Buchanan                                                                                          [address] [facility] [address]      1950  [phone]      Chief Complaint:     Hospital emergency room follow-up for complaints of fever and diarrhea.  Follow-up initiation of treatment for hypogonadism, hypothyroidism, BPH, impaired fasting glucose.    History of Present Illness:    Patient with a history of colon polyps, esophageal reflux, hyperlipidemia, hypogonadism, hypothyroidism, impaired fasting glucose, sleep apnea, environmental allergies and BPH.  He presents today to follow-up on a recent emergency room visit for complaints of diarrhea, fever, crampy abdominal pain and chills.  Patient also had lab work prior to this visit in order to evaluate treatment for hypogonadism and hypothyroidism.  Past medical history reviewed and updated where necessary including health maintenance parameters.  This reveals he needs influenza vaccine.    The history regarding recent hospital emergency room visit for presumed infectious colitis:    10/02/2018--patient seen in follow-up by Dr. Kenney.  He reports his stools are now formed but somewhat soft.  Diarrhea has essentially resolved.  No further fever.  I have recommended he start a generic probiotic twice daily for the next couple of weeks.  He should contact me for any return of symptoms.    09/21/2018--patient presented to emergency room with complaints of diarrhea that began 3 days prior.  There was some associated blackness in the stools but he was heme-negative.  He had nausea but no vomiting.  This was associated with chills and chest pain.  His fever was on 102.1 upon presentation.  Creatinine mildly elevated at 1.45.  White count was normal but there was a left shift.  CT scan of the abdomen and pelvis revealed mild colitis without obstruction or perforation.  Patient was discharged home on  Flagyl and Cipro.    Review of Systems   Constitution: Negative.   HENT: Negative.    Eyes: Negative.    Cardiovascular: Negative.    Respiratory: Negative.    Endocrine: Negative.    Hematologic/Lymphatic: Negative.    Skin: Negative.    Musculoskeletal: Negative.    Gastrointestinal: Negative.    Genitourinary: Negative.    Neurological: Negative.    Psychiatric/Behavioral: Negative.    Allergic/Immunologic: Negative.        Active Problems:    Patient Active Problem List   Diagnosis   • Allergic rhinitis   • History of colon polyps, 01/14/2016--normal study. 2010--serrated adenoma ×1.     • Gastroesophageal reflux disease without esophagitis   • Hyperlipidemia   • Hypogonadism male, 08/14/2018--TRT initiated.   • Hypothyroidism   • Impaired fasting glucose   • Male erectile disorder   • Multiple environmental allergies   • Obstructive sleep apnea, 09/08/2005--AHI 18.2.  Oxygen saturation 81%.  Patient cannot tolerate CPAP.   • Vitamin D deficiency   • Therapeutic drug monitoring   • Benign prostatic hypertrophy   • Routine physical examination   • Infectious colitis   • Hospital emergency room discharge follow-up         Past Medical History:   Diagnosis Date   • Allergic rhinitis 2/21/2014    Patient reports he had allergy testing several years ago in his mid 20s. He reports allergies to molds. He was on immunotherapy for approximately one year and then this was discontinued. He was told he did not need it.   02/21/2014--Flonase 2 sprays each nostril daily initiated.   • Benign prostatic hypertrophy 2/9/2017 02/29/2017--patient reports sudden onset a few days ago with dysuria and BPH obstructive symptoms.  He had intercourse recently and after that begin to have burning with urination which has continued.  He subsequently developed intermittent obstructive symptoms consisting of urgency, weak stream, dribbling and sensation of incomplete voiding.  Digital rectal examination avoided today.  Urinalysis, urine  culture, PSA ordered.  Empiric Cipro 500 mg by mouth twice a day ×30 days, Flomax 0.4 mg by mouth daily.  I will have patient follow-up after completion of antibiotics with a PSA 2 days after completion of the antibiotics providing his PSA comes back elevated or above baseline.   • Gastroesophageal reflux disease without esophagitis 4/29/2016   • History of acute prostatitis 2/9/2017    03/10/2017--patient seen in follow-up and his symptoms resolved.  PSA is 0.630.  PSA at the initiation of treatment was 1.03.  Urinalysis and urine culture returned negative.  02/29/2017--patient reports sudden onset a few days ago with dysuria and BPH obstructive symptoms.  He had intercourse recently and after that begin to have burning with urination which has continued.  He subsequently developed intermittent obstructive symptoms consisting of urgency, weak stream, dribbling and sensation of incomplete voiding.  Digital rectal examination avoided today.  Urinalysis, urine culture, PSA ordered.  Empiric Cipro 500 mg by mouth twice a day ×30 days, Flomax 0.4 mg by mouth daily.  I will have patient follow-up after completion of antibiotics with a PSA 2 days after completion of the antibiotics providing his PSA comes back elevated or above baseline.   • History of BPPV (benign paroxysmal positional vertigo) 6/20/2016 12/19/2016--patient reports symptoms have resolved.  06/20/2016--patient presents with a new complaint of a several week history of intermittent episodes of dizziness described as a spinning sensation.  It is always associated with abrupt standing for lying down to go to bed.  Only lasts for a very short period of time and there is no other associated symptoms.  He does not feel the symptoms are bad enough at the present time to warrant vestibular therapy.   • History of colon polyps, 01/14/2016--normal study. 2010--serrated adenoma ×1.   5/10/2010    01/14/2016--normal colonoscopy.  Recommended repeat study in 10  years.  05/10/2010--colonoscopy revealed a 3 mm cecal polyp that was removed completely. The remainder of the colonoscopy was normal. Recommend repeat colonoscopy in 5 years. If the polyp is adenomatous, would recommend all first-degree relatives over age 40 be in screening as well. Pathology returned serrated adenoma.   • History of pneumonia 12/06/2014 12/06/2014--patient seen in follow up and reports he is definitely better but he is still having a cough that seems to come and go. This been no fever. He still has some shortness of breath but this is better. Lung examination reveals continued bibasilar crackles but they are somewhat improved over previous. I reviewed the chest x-ray results and even though the chest x-ray shows no definite pneum   • History of Thoracic compression fracture 03/28/2004 03/28/2004--patient fell and suffered a moderate T12 compression fracture. No subluxation or retropulsion of fracture fragments is seen. Approximately 40% loss of anterior vertebral body height.   • Hyperlipidemia 4/29/2016   • Hypogonadism male, 08/14/2018--TRT initiated. 6/10/2013    02/21/2014--treatment for hypothyroidism discontinued. Patient did not feel any better with the testosterone. It also is extremely expensive.  06/10/2013--treatment for hypogonadism begun.      • Hypothyroidism 4/29/2016   • Impaired fasting glucose 4/29/2016   • Male erectile disorder 11/23/2015 11/23/2015--patient presents with complaints of erectile dysfunction. He does have borderline hypogonadism but previous treatment was not particularly effective. I gave him samples of Stendra, Viagra, Staxyn.   • Multiple environmental allergies 4/29/2016    Patient reports he had allergy testing several years ago in his mid 20s. He reports allergies to molds. He was on immunotherapy for approximately one year and then this was discontinued. He was told he did not need it.   • Obstructive sleep apnea, 09/08/2005--AHI 18.2.  Oxygen  saturation 81%.  Patient cannot tolerate CPAP. 9/8/2005 09/08/2005--sleep study revealed an apnea/hypopnea index for total sleep time to be moderately abnormal at 18.2. Lowest oxygen saturation 81%. CPAP was tried but patient is intolerant.   • Vitamin D deficiency 4/29/2016         Past Surgical History:   Procedure Laterality Date   • ANKLE SURGERY Left 2004 2004--repair of left ankle fracture with hardware placement.   • CARPAL TUNNEL RELEASE Right 1989 1989--right carpal tunnel release.   • COLONOSCOPY  05/10/2010    05/10/2010--colonoscopy revealed a 3 mm cecal polyp that was removed completely. The remainder of the colonoscopy was normal. Recommend repeat colonoscopy in 5 years. If the polyp is adenomatous, would recommend all first-degree relatives over age 40 be in screening as well. Pathology returned serrated adenoma.   • COLONOSCOPY  01/14/2016 01/14/2016--normal colonoscopy.  Recommended repeat study in 10 years.   • LAPAROSCOPIC CHOLECYSTECTOMY  04/03/2012 04/03/2012--laparoscopic cholecystectomy. Pathology returned cholesterolosis.   • TONSILLECTOMY  Childhood    Childhood tonsillectomy         No Known Allergies        Current Outpatient Prescriptions:   •  aspirin 81 MG tablet, Take 1 tablet by mouth Daily., Disp: , Rfl:   •  Cholecalciferol (VITAMIN D3) 5000 UNITS capsule capsule, Take 1 capsule by mouth daily., Disp: , Rfl:   •  ezetimibe (ZETIA) 10 MG tablet, TAKE 1 TABLET DAILY, Disp: 90 tablet, Rfl: 1  •  fluticasone (FLONASE) 50 MCG/ACT nasal spray, PLACE 2 SPRAYS IN EACH NOSTRIL DAILY, Disp: 3 bottle, Rfl: 9  •  levothyroxine (SYNTHROID) 150 MCG tablet, One by mouth daily for low thyroid, Disp: 90 tablet, Rfl: 3  •  OXcarbazepine (TRILEPTAL) 300 MG tablet, 1 tablet Daily., Disp: , Rfl:   •  simvastatin (ZOCOR) 40 MG tablet, Take 1 tablet by mouth Every Night., Disp: 90 tablet, Rfl: 0  •  Testosterone (ANDROGEL PUMP) 20.25 MG/ACT (1.62%) gel, Apply a total of 2 pumps daily as  "directed, Disp: 75 g, Rfl: 5      Family History   Problem Relation Age of Onset   • Cancer Father         Liver Cancer. Father  from liver cancer.   • Heart attack Brother         Acute Myocardial Infarction.  Brother had a myocardial infarction at age 68.         Social History     Social History   • Marital status:      Spouse name: N/A   • Number of children: N/A   • Years of education: N/A     Occupational History   • Retired - [employer]      Social History Main Topics   • Smoking status: Former Smoker   • Smokeless tobacco: Never Used      Comment: Stopped smoking at age 35.   • Alcohol use Yes      Comment: Moderately   • Drug use: No   • Sexual activity: Yes     Partners: Female     Other Topics Concern   • Not on file     Social History Narrative   • No narrative on file         Vitals:    10/02/18 0659   BP: 104/66   BP Location: Right arm   Pulse: 73   SpO2: 98%   Weight: 104 kg (230 lb)   Height: 172.7 cm (67.99\")          Physical Exam:    General: Alert and oriented x 3.  No acute distress. Obese. Normal affect.  HEENT: Pupils equal, round, reactive to light; extraocular movements intact; sclerae nonicteric; pharynx, ear canals and TMs normal.  Neck: Without JVD, thyromegaly, bruit, or adenopathy.  Lungs: Clear to auscultation in all fields.  Heart: Regular rate and rhythm without murmur, rub, gallop, or click.  Abdomen: Soft, nontender, without hepatosplenomegaly or hernia.  Bowel sounds normal.  : Deferred.  Rectal: Deferred.  Extremities: Without clubbing, cyanosis, edema, or pulse deficit.  Neurologic: Intact without focal deficit.  Normal station and gait observed during ingress and egress from the examination room.  Skin: Without significant lesion.  Musculoskeletal: Unremarkable.      Lab/other results:    Total testosterone is normal at 369.  Free and weakly bound testosterone normal at 58.7.  TSH elevated at 7.48.  Free T3 is low normal at 2.1.  Free T4 normal at " 1.41.    Assessment/Plan:     Diagnosis Plan   1. Hospital emergency room discharge follow-up     2. Infectious colitis     3. Need for influenza vaccination  Flu Vaccine High Dose PF 65YR+ (0804-1294)   4. Hypogonadism male, 08/14/2018--TRT initiated.     5. Hypothyroidism, unspecified type     6. Benign prostatic hypertrophy     7. Impaired fasting glucose       Patient presents in follow-up after recent hospital emergency room visit for complaints of diarrhea, abdominal pain, fever and chills consistent with infectious colitis.  He has completed Cipro and Flagyl and his symptoms had nearly totally resolved.  See above.  Fever and pain has certainly resolved.  Stools are not totally normal.  Patient has symptomatic hypogonadism and he has therapeutic testosterone levels.  I suspect his levels may actually be even higher given the fact that he was out of the testosterone preparation just prior to the lab draw.  Patient also missed a couple of doses of his levothyroxine because of his illness.  Therefore, I will not make any changes to his levothyroxine at this time.  We will monitor it closely.  Patient has BPH which is asymptomatic and his PSA is normal.    Plan is as follows: High-dose influenza vaccine given.  Patient will be getting the shingles vaccination soon.  Recommended a probiotic, generic, twice daily for the next couple of weeks.  Patient will follow-up after the first year with lab prior in order to monitor all of his chronic medical issues.        Procedures

## 2018-10-09 DIAGNOSIS — E78.5 HYPERLIPIDEMIA, UNSPECIFIED HYPERLIPIDEMIA TYPE: Chronic | ICD-10-CM

## 2018-10-09 RX ORDER — EZETIMIBE 10 MG/1
TABLET ORAL
Qty: 90 TABLET | Refills: 0 | Status: SHIPPED | OUTPATIENT
Start: 2018-10-09 | End: 2019-01-19 | Stop reason: SDUPTHER

## 2018-10-09 RX ORDER — SIMVASTATIN 40 MG
TABLET ORAL
Qty: 90 TABLET | Refills: 0 | Status: SHIPPED | OUTPATIENT
Start: 2018-10-09 | End: 2019-01-19 | Stop reason: SDUPTHER

## 2018-11-06 ENCOUNTER — OFFICE VISIT (OUTPATIENT)
Dept: INTERNAL MEDICINE | Facility: CLINIC | Age: 68
End: 2018-11-06

## 2018-11-06 VITALS
DIASTOLIC BLOOD PRESSURE: 60 MMHG | HEIGHT: 68 IN | HEART RATE: 70 BPM | BODY MASS INDEX: 35.95 KG/M2 | OXYGEN SATURATION: 98 % | WEIGHT: 237.2 LBS | SYSTOLIC BLOOD PRESSURE: 118 MMHG

## 2018-11-06 DIAGNOSIS — Z48.02 ENCOUNTER FOR REMOVAL OF SUTURES: ICD-10-CM

## 2018-11-06 DIAGNOSIS — E66.01 MORBIDLY OBESE (HCC): ICD-10-CM

## 2018-11-06 DIAGNOSIS — S51.012D ELBOW LACERATION, LEFT, SUBSEQUENT ENCOUNTER: Primary | ICD-10-CM

## 2018-11-06 PROBLEM — A09 INFECTIOUS COLITIS: Status: RESOLVED | Noted: 2018-10-02 | Resolved: 2018-11-06

## 2018-11-06 PROBLEM — Z09 HOSPITAL DISCHARGE FOLLOW-UP: Status: RESOLVED | Noted: 2018-10-02 | Resolved: 2018-11-06

## 2018-11-06 PROCEDURE — 99212 OFFICE O/P EST SF 10 MIN: CPT | Performed by: INTERNAL MEDICINE

## 2018-11-06 NOTE — PROGRESS NOTES
11/06/2018    Patient Information  Nilson Buchanan                                                                                          [address] [facility] [address]      1950  [phone]  There is no work phone number on file.    Chief Complaint:     Follow-up recent emergency room visit for left elbow trauma.    History of Present Illness:    Patient with a history of colon polyps, esophageal reflux, hyperlipidemia, hypothyroidism, impaired fasting glucose, environmental allergies, sleep apnea, BPH.  He presents today for follow-up of recent emergency room visit after suffering trauma to his left elbow which resulted in laceration which was repaired in the emergency room.  Patient currently reports no significant pain.  He also reports no redness, swelling, drainage, or pus.  He is able to move his elbow normally without pain.  He needs his sutures removed.  Past medical history reviewed and updated where necessary including health maintenance parameters.  This reveals he is up-to-date or else accounted for.    Review of Systems   Constitution: Negative.   HENT: Negative.    Eyes: Negative.    Cardiovascular: Negative.    Respiratory: Negative.    Endocrine: Negative.    Hematologic/Lymphatic: Negative.    Skin: Negative.    Musculoskeletal: Negative.    Gastrointestinal: Negative.    Genitourinary: Negative.    Neurological: Negative.    Psychiatric/Behavioral: Negative.    Allergic/Immunologic: Negative.        Active Problems:    Patient Active Problem List   Diagnosis   • Allergic rhinitis   • History of colon polyps, 01/14/2016--normal study. 2010--serrated adenoma ×1.     • Gastroesophageal reflux disease without esophagitis   • Hyperlipidemia   • Hypogonadism male, 08/14/2018--TRT initiated.   • Hypothyroidism   • Impaired fasting glucose   • Male erectile disorder   • Multiple environmental allergies   • Obstructive sleep apnea, 09/08/2005--AHI 18.2.  Oxygen saturation  81%.  Patient cannot tolerate CPAP.   • Vitamin D deficiency   • Therapeutic drug monitoring   • Benign prostatic hypertrophy   • Routine physical examination   • Elbow laceration, left, subsequent encounter   • Encounter for removal of sutures         Past Medical History:   Diagnosis Date   • Allergic rhinitis 2/21/2014    Patient reports he had allergy testing several years ago in his mid 20s. He reports allergies to molds. He was on immunotherapy for approximately one year and then this was discontinued. He was told he did not need it.   02/21/2014--Flonase 2 sprays each nostril daily initiated.   • Benign prostatic hypertrophy 2/9/2017 02/29/2017--patient reports sudden onset a few days ago with dysuria and BPH obstructive symptoms.  He had intercourse recently and after that begin to have burning with urination which has continued.  He subsequently developed intermittent obstructive symptoms consisting of urgency, weak stream, dribbling and sensation of incomplete voiding.  Digital rectal examination avoided today.  Urinalysis, urine culture, PSA ordered.  Empiric Cipro 500 mg by mouth twice a day ×30 days, Flomax 0.4 mg by mouth daily.  I will have patient follow-up after completion of antibiotics with a PSA 2 days after completion of the antibiotics providing his PSA comes back elevated or above baseline.   • Gastroesophageal reflux disease without esophagitis 4/29/2016   • History of acute prostatitis 2/9/2017    03/10/2017--patient seen in follow-up and his symptoms resolved.  PSA is 0.630.  PSA at the initiation of treatment was 1.03.  Urinalysis and urine culture returned negative.  02/29/2017--patient reports sudden onset a few days ago with dysuria and BPH obstructive symptoms.  He had intercourse recently and after that begin to have burning with urination which has continued.  He subsequently developed intermittent obstructive symptoms consisting of urgency, weak stream, dribbling and sensation of  incomplete voiding.  Digital rectal examination avoided today.  Urinalysis, urine culture, PSA ordered.  Empiric Cipro 500 mg by mouth twice a day ×30 days, Flomax 0.4 mg by mouth daily.  I will have patient follow-up after completion of antibiotics with a PSA 2 days after completion of the antibiotics providing his PSA comes back elevated or above baseline.   • History of BPPV (benign paroxysmal positional vertigo) 6/20/2016 12/19/2016--patient reports symptoms have resolved.  06/20/2016--patient presents with a new complaint of a several week history of intermittent episodes of dizziness described as a spinning sensation.  It is always associated with abrupt standing for lying down to go to bed.  Only lasts for a very short period of time and there is no other associated symptoms.  He does not feel the symptoms are bad enough at the present time to warrant vestibular therapy.   • History of colon polyps, 01/14/2016--normal study. 2010--serrated adenoma ×1.   5/10/2010    01/14/2016--normal colonoscopy.  Recommended repeat study in 10 years.  05/10/2010--colonoscopy revealed a 3 mm cecal polyp that was removed completely. The remainder of the colonoscopy was normal. Recommend repeat colonoscopy in 5 years. If the polyp is adenomatous, would recommend all first-degree relatives over age 40 be in screening as well. Pathology returned serrated adenoma.   • History of pneumonia 12/06/2014 12/06/2014--patient seen in follow up and reports he is definitely better but he is still having a cough that seems to come and go. This been no fever. He still has some shortness of breath but this is better. Lung examination reveals continued bibasilar crackles but they are somewhat improved over previous. I reviewed the chest x-ray results and even though the chest x-ray shows no definite pneum   • History of Thoracic compression fracture 03/28/2004 03/28/2004--patient fell and suffered a moderate T12 compression fracture.  No subluxation or retropulsion of fracture fragments is seen. Approximately 40% loss of anterior vertebral body height.   • Hyperlipidemia 4/29/2016   • Hypogonadism male, 08/14/2018--TRT initiated. 6/10/2013    02/21/2014--treatment for hypothyroidism discontinued. Patient did not feel any better with the testosterone. It also is extremely expensive.  06/10/2013--treatment for hypogonadism begun.      • Hypothyroidism 4/29/2016   • Impaired fasting glucose 4/29/2016   • Male erectile disorder 11/23/2015 11/23/2015--patient presents with complaints of erectile dysfunction. He does have borderline hypogonadism but previous treatment was not particularly effective. I gave him samples of Stendra, Viagra, Staxyn.   • Multiple environmental allergies 4/29/2016    Patient reports he had allergy testing several years ago in his mid 20s. He reports allergies to molds. He was on immunotherapy for approximately one year and then this was discontinued. He was told he did not need it.   • Obstructive sleep apnea, 09/08/2005--AHI 18.2.  Oxygen saturation 81%.  Patient cannot tolerate CPAP. 9/8/2005 09/08/2005--sleep study revealed an apnea/hypopnea index for total sleep time to be moderately abnormal at 18.2. Lowest oxygen saturation 81%. CPAP was tried but patient is intolerant.   • Vitamin D deficiency 4/29/2016         Past Surgical History:   Procedure Laterality Date   • ANKLE SURGERY Left 2004 2004--repair of left ankle fracture with hardware placement.   • CARPAL TUNNEL RELEASE Right 1989 1989--right carpal tunnel release.   • COLONOSCOPY  05/10/2010    05/10/2010--colonoscopy revealed a 3 mm cecal polyp that was removed completely. The remainder of the colonoscopy was normal. Recommend repeat colonoscopy in 5 years. If the polyp is adenomatous, would recommend all first-degree relatives over age 40 be in screening as well. Pathology returned serrated adenoma.   • COLONOSCOPY  01/14/2016 01/14/2016--normal  colonoscopy.  Recommended repeat study in 10 years.   • LAPAROSCOPIC CHOLECYSTECTOMY  2012--laparoscopic cholecystectomy. Pathology returned cholesterolosis.   • TONSILLECTOMY  Childhood    Childhood tonsillectomy         No Known Allergies        Current Outpatient Prescriptions:   •  aspirin 81 MG tablet, Take 1 tablet by mouth Daily., Disp: , Rfl:   •  Cholecalciferol (VITAMIN D3) 5000 UNITS capsule capsule, Take 1 capsule by mouth daily., Disp: , Rfl:   •  ezetimibe (ZETIA) 10 MG tablet, TAKE 1 TABLET DAILY, Disp: 90 tablet, Rfl: 0  •  fluticasone (FLONASE) 50 MCG/ACT nasal spray, PLACE 2 SPRAYS IN EACH NOSTRIL DAILY, Disp: 3 bottle, Rfl: 9  •  levothyroxine (SYNTHROID) 150 MCG tablet, One by mouth daily for low thyroid, Disp: 90 tablet, Rfl: 3  •  OXcarbazepine (TRILEPTAL) 300 MG tablet, 1 tablet Daily., Disp: , Rfl:   •  simvastatin (ZOCOR) 40 MG tablet, TAKE 1 TABLET EVERY NIGHT (NEED LABS AND FOLLOW UP AS SOON AS POSSIBLE), Disp: 90 tablet, Rfl: 0  •  Testosterone (ANDROGEL PUMP) 20.25 MG/ACT (1.62%) gel, Apply a total of 2 pumps daily as directed, Disp: 75 g, Rfl: 5      Family History   Problem Relation Age of Onset   • Cancer Father         Liver Cancer. Father  from liver cancer.   • Heart attack Brother         Acute Myocardial Infarction.  Brother had a myocardial infarction at age 68.         Social History     Social History   • Marital status:      Spouse name: N/A   • Number of children: N/A   • Years of education: N/A     Occupational History   • Retired - [employer]      Social History Main Topics   • Smoking status: Former Smoker   • Smokeless tobacco: Never Used      Comment: Stopped smoking at age 35.   • Alcohol use Yes      Comment: Moderately   • Drug use: No   • Sexual activity: Yes     Partners: Female     Other Topics Concern   • Not on file     Social History Narrative   • No narrative on file         Vitals:    18 0700   BP: 118/60   BP Location: Right  "arm   Pulse: 70   SpO2: 98%   Weight: 108 kg (237 lb 3.2 oz)   Height: 172.7 cm (67.99\")          Physical Exam:    Brief general physical examination is unrevealing and unchanged.  Examination of the left elbow reveals an approximate 25-30 mm laceration that over the left olecranon bursal region which appears to be healing well without signs of infection.  Sutures removed.    Lab/other results:      Assessment/Plan:     Diagnosis Plan   1. Elbow laceration, left, subsequent encounter     2. Morbidly obese (CMS/HCC)     3. Encounter for removal of sutures         11/06/2018--patient reports he was moving a heavy object and his feet slipped out from under him and he fell backwards onto his left elbow and suffered a laceration.  He went to the emergency room and the wound was sutured.  No x-rays taken.  Patient reports no pain or any other symptoms.  On exam his wound appears to be healing well is approximately 25-30 mm in length and located right over the olecranon bursa.  There is no sign of olecranon bursitis or cellulitis.  Sutures removed, #4.  Signs and symptoms of infection or olecranon bursitis discussed.  Patient is up-to-date on his tetanus.    Plan is as follows: Patient will be on the lookout for signs of infection.  He needs to see me for any significant swelling, redness, warmth, or discharge.  The signs and symptoms of olecranon bursitis/cellulitis discussed.  Bacitracin dressing changes for the next 2-3 days.  Also instructed patient to let the air get to the lesion for several hours on a daily basis.  Follow-up as needed or as previously planned.      Procedures        "

## 2018-12-27 RX ORDER — FLUTICASONE PROPIONATE 50 MCG
SPRAY, SUSPENSION (ML) NASAL
Qty: 16 G | Refills: 8 | Status: SHIPPED | OUTPATIENT
Start: 2018-12-27 | End: 2020-02-11 | Stop reason: SDUPTHER

## 2019-01-09 DIAGNOSIS — N40.1 BENIGN NON-NODULAR PROSTATIC HYPERPLASIA WITH LOWER URINARY TRACT SYMPTOMS: Chronic | ICD-10-CM

## 2019-01-09 DIAGNOSIS — R73.01 IMPAIRED FASTING GLUCOSE: Chronic | ICD-10-CM

## 2019-01-09 DIAGNOSIS — Z51.81 THERAPEUTIC DRUG MONITORING: ICD-10-CM

## 2019-01-09 DIAGNOSIS — E78.5 HYPERLIPIDEMIA, UNSPECIFIED HYPERLIPIDEMIA TYPE: Chronic | ICD-10-CM

## 2019-01-09 DIAGNOSIS — E55.9 VITAMIN D DEFICIENCY: Chronic | ICD-10-CM

## 2019-01-09 DIAGNOSIS — E03.9 HYPOTHYROIDISM, UNSPECIFIED TYPE: Chronic | ICD-10-CM

## 2019-01-11 ENCOUNTER — TELEPHONE (OUTPATIENT)
Dept: INTERNAL MEDICINE | Facility: CLINIC | Age: 69
End: 2019-01-11

## 2019-01-15 LAB
25(OH)D3+25(OH)D2 SERPL-MCNC: 37.7 NG/ML (ref 30–100)
ALBUMIN SERPL-MCNC: 4.2 G/DL (ref 3.5–5.2)
ALBUMIN/GLOB SERPL: 1.7 G/DL
ALP SERPL-CCNC: 66 U/L (ref 39–117)
ALT SERPL-CCNC: 25 U/L (ref 1–41)
AST SERPL-CCNC: 19 U/L (ref 1–40)
BILIRUB SERPL-MCNC: 0.4 MG/DL (ref 0.1–1.2)
BUN SERPL-MCNC: 13 MG/DL (ref 8–23)
BUN/CREAT SERPL: 10.6 (ref 7–25)
CALCIUM SERPL-MCNC: 9.1 MG/DL (ref 8.6–10.5)
CHLORIDE SERPL-SCNC: 102 MMOL/L (ref 98–107)
CHOLEST SERPL-MCNC: 113 MG/DL (ref 100–199)
CK SERPL-CCNC: 137 U/L (ref 20–200)
CO2 SERPL-SCNC: 27.4 MMOL/L (ref 22–29)
CREAT SERPL-MCNC: 1.23 MG/DL (ref 0.76–1.27)
ERYTHROCYTE [DISTWIDTH] IN BLOOD BY AUTOMATED COUNT: 12.9 % (ref 11.5–14.5)
GLOBULIN SER CALC-MCNC: 2.5 GM/DL
GLUCOSE SERPL-MCNC: 131 MG/DL (ref 65–99)
HBA1C MFR BLD: 6.33 % (ref 4.8–5.6)
HCT VFR BLD AUTO: 43.8 % (ref 40.4–52.2)
HDL SERPL-SCNC: 30.1 UMOL/L
HDLC SERPL-MCNC: 37 MG/DL
HGB BLD-MCNC: 14.5 G/DL (ref 13.7–17.6)
LDL SERPL QN: 19.8 NM
LDL SERPL-SCNC: 818 NMOL/L
LDL SMALL SERPL-SCNC: 627 NMOL/L
LDLC SERPL CALC-MCNC: 48 MG/DL (ref 0–99)
MCH RBC QN AUTO: 31.2 PG (ref 27–32.7)
MCHC RBC AUTO-ENTMCNC: 33.1 G/DL (ref 32.6–36.4)
MCV RBC AUTO: 94.2 FL (ref 79.8–96.2)
PLATELET # BLD AUTO: 224 10*3/MM3 (ref 140–500)
POTASSIUM SERPL-SCNC: 4.4 MMOL/L (ref 3.5–5.2)
PROT SERPL-MCNC: 6.7 G/DL (ref 6–8.5)
PSA SERPL-MCNC: 0.82 NG/ML (ref 0–4)
RBC # BLD AUTO: 4.65 10*6/MM3 (ref 4.6–6)
SODIUM SERPL-SCNC: 141 MMOL/L (ref 136–145)
T3FREE SERPL-MCNC: 3.3 PG/ML (ref 2–4.4)
T4 FREE SERPL-MCNC: 1.38 NG/DL (ref 0.93–1.7)
TESTOST SERPL-MCNC: 520 NG/DL (ref 264–916)
TESTOSTERONE.FREE+WB MFR SERPL: 25.7 % (ref 9–46)
TESTOSTERONE.FREE+WB SERPL-MCNC: 133.6 NG/DL (ref 40–250)
TRIGL SERPL-MCNC: 141 MG/DL (ref 0–149)
TSH SERPL DL<=0.005 MIU/L-ACNC: 0.53 MIU/ML (ref 0.27–4.2)
WBC # BLD AUTO: 6.56 10*3/MM3 (ref 4.5–10.7)

## 2019-01-19 DIAGNOSIS — E78.5 HYPERLIPIDEMIA, UNSPECIFIED HYPERLIPIDEMIA TYPE: Chronic | ICD-10-CM

## 2019-01-21 RX ORDER — EZETIMIBE 10 MG/1
TABLET ORAL
Qty: 90 TABLET | Refills: 0 | Status: SHIPPED | OUTPATIENT
Start: 2019-01-21 | End: 2019-04-20 | Stop reason: SDUPTHER

## 2019-01-21 RX ORDER — SIMVASTATIN 40 MG
TABLET ORAL
Qty: 90 TABLET | Refills: 0 | Status: SHIPPED | OUTPATIENT
Start: 2019-01-21 | End: 2019-01-30 | Stop reason: SDUPTHER

## 2019-01-30 ENCOUNTER — OFFICE VISIT (OUTPATIENT)
Dept: INTERNAL MEDICINE | Facility: CLINIC | Age: 69
End: 2019-01-30

## 2019-01-30 VITALS
HEIGHT: 68 IN | SYSTOLIC BLOOD PRESSURE: 120 MMHG | DIASTOLIC BLOOD PRESSURE: 70 MMHG | OXYGEN SATURATION: 98 % | BODY MASS INDEX: 35.92 KG/M2 | HEART RATE: 76 BPM | WEIGHT: 237 LBS

## 2019-01-30 DIAGNOSIS — E78.5 HYPERLIPIDEMIA, UNSPECIFIED HYPERLIPIDEMIA TYPE: Chronic | ICD-10-CM

## 2019-01-30 DIAGNOSIS — Z00.00 ROUTINE PHYSICAL EXAMINATION: ICD-10-CM

## 2019-01-30 DIAGNOSIS — N40.1 BENIGN NON-NODULAR PROSTATIC HYPERPLASIA WITH LOWER URINARY TRACT SYMPTOMS: Chronic | ICD-10-CM

## 2019-01-30 DIAGNOSIS — E66.01 MORBIDLY OBESE (HCC): ICD-10-CM

## 2019-01-30 DIAGNOSIS — G47.33 OBSTRUCTIVE SLEEP APNEA: Chronic | ICD-10-CM

## 2019-01-30 DIAGNOSIS — R73.01 IMPAIRED FASTING GLUCOSE: Primary | Chronic | ICD-10-CM

## 2019-01-30 DIAGNOSIS — F39 MOOD DISORDER (HCC): ICD-10-CM

## 2019-01-30 DIAGNOSIS — Z51.81 THERAPEUTIC DRUG MONITORING: ICD-10-CM

## 2019-01-30 DIAGNOSIS — K21.9 GASTROESOPHAGEAL REFLUX DISEASE WITHOUT ESOPHAGITIS: Chronic | ICD-10-CM

## 2019-01-30 DIAGNOSIS — E03.9 HYPOTHYROIDISM, UNSPECIFIED TYPE: Chronic | ICD-10-CM

## 2019-01-30 DIAGNOSIS — E29.1 HYPOGONADISM MALE: Chronic | ICD-10-CM

## 2019-01-30 DIAGNOSIS — Z91.09 MULTIPLE ENVIRONMENTAL ALLERGIES: Chronic | ICD-10-CM

## 2019-01-30 DIAGNOSIS — E55.9 VITAMIN D DEFICIENCY: Chronic | ICD-10-CM

## 2019-01-30 DIAGNOSIS — Z86.010 HISTORY OF COLON POLYPS: Chronic | ICD-10-CM

## 2019-01-30 PROBLEM — R45.4 IRRITABILITY AND ANGER: Status: ACTIVE | Noted: 2019-01-30

## 2019-01-30 PROBLEM — S51.012D ELBOW LACERATION, LEFT, SUBSEQUENT ENCOUNTER: Status: RESOLVED | Noted: 2018-11-06 | Resolved: 2019-01-30

## 2019-01-30 PROBLEM — Z48.02 ENCOUNTER FOR REMOVAL OF SUTURES: Status: RESOLVED | Noted: 2018-11-06 | Resolved: 2019-01-30

## 2019-01-30 PROCEDURE — 99214 OFFICE O/P EST MOD 30 MIN: CPT | Performed by: INTERNAL MEDICINE

## 2019-01-30 RX ORDER — OXCARBAZEPINE 300 MG/1
TABLET, FILM COATED ORAL
Qty: 30 TABLET | Refills: 6
Start: 2019-01-30 | End: 2020-01-01

## 2019-01-30 RX ORDER — SIMVASTATIN 40 MG
TABLET ORAL
Qty: 90 TABLET | Refills: 3
Start: 2019-01-30 | End: 2019-04-20 | Stop reason: SDUPTHER

## 2019-01-30 NOTE — PROGRESS NOTES
01/30/2019    Patient Information  Nilson Buchanan                                                                                          [address] [facility] [place] [address]      1950  [unfilled]  There is no work phone number on file.    Chief Complaint:     Follow-up impaired fasting glucose, hypothyroidism, hypogonadism, hyperlipidemia, esophageal reflux, history of colon polyps, multiple environmental allergies, sleep apnea, BPH, vitamin D deficiency.  No new acute complaints.    History of Present Illness:    Patient with a history of medical problems as outlined in chief complaint presents today for a follow-up with lab prior in order to monitor his chronic medical issues.    Review of Systems   Constitution: Negative.   HENT: Negative.    Eyes: Negative.    Cardiovascular: Negative.    Respiratory: Negative.    Endocrine: Negative.    Hematologic/Lymphatic: Negative.    Skin: Negative.    Musculoskeletal: Negative.    Gastrointestinal: Negative.    Genitourinary: Negative.    Neurological: Negative.    Psychiatric/Behavioral: Negative.    Allergic/Immunologic: Negative.        Active Problems:    Patient Active Problem List   Diagnosis   • Allergic rhinitis   • History of colon polyps, 01/14/2016--normal study. 2010--serrated adenoma ×1.     • Gastroesophageal reflux disease without esophagitis   • Hyperlipidemia   • Hypogonadism male, 08/14/2018--TRT initiated.   • Hypothyroidism   • Impaired fasting glucose   • Male erectile disorder   • Multiple environmental allergies   • Obstructive sleep apnea, 09/08/2005--AHI 18.2.  Oxygen saturation 81%.  Patient cannot tolerate CPAP.   • Vitamin D deficiency   • Therapeutic drug monitoring   • Morbidly obese (CMS/HCC)   • Benign prostatic hypertrophy   • Routine physical examination   • Mood disorder (CMS/HCC)         Past Medical History:   Diagnosis Date   • Allergic rhinitis 2/21/2014    Patient reports he had allergy testing several  years ago in his mid 20s. He reports allergies to molds. He was on immunotherapy for approximately one year and then this was discontinued. He was told he did not need it.   02/21/2014--Flonase 2 sprays each nostril daily initiated.   • Benign prostatic hypertrophy 2/9/2017 02/29/2017--patient reports sudden onset a few days ago with dysuria and BPH obstructive symptoms.  He had intercourse recently and after that begin to have burning with urination which has continued.  He subsequently developed intermittent obstructive symptoms consisting of urgency, weak stream, dribbling and sensation of incomplete voiding.  Digital rectal examination avoided today.  Urinalysis, urine culture, PSA ordered.  Empiric Cipro 500 mg by mouth twice a day ×30 days, Flomax 0.4 mg by mouth daily.  I will have patient follow-up after completion of antibiotics with a PSA 2 days after completion of the antibiotics providing his PSA comes back elevated or above baseline.   • Gastroesophageal reflux disease without esophagitis 4/29/2016   • History of acute prostatitis 2/9/2017    03/10/2017--patient seen in follow-up and his symptoms resolved.  PSA is 0.630.  PSA at the initiation of treatment was 1.03.  Urinalysis and urine culture returned negative.  02/29/2017--patient reports sudden onset a few days ago with dysuria and BPH obstructive symptoms.  He had intercourse recently and after that begin to have burning with urination which has continued.  He subsequently developed intermittent obstructive symptoms consisting of urgency, weak stream, dribbling and sensation of incomplete voiding.  Digital rectal examination avoided today.  Urinalysis, urine culture, PSA ordered.  Empiric Cipro 500 mg by mouth twice a day ×30 days, Flomax 0.4 mg by mouth daily.  I will have patient follow-up after completion of antibiotics with a PSA 2 days after completion of the antibiotics providing his PSA comes back elevated or above baseline.   • History  of BPPV (benign paroxysmal positional vertigo) 6/20/2016 12/19/2016--patient reports symptoms have resolved.  06/20/2016--patient presents with a new complaint of a several week history of intermittent episodes of dizziness described as a spinning sensation.  It is always associated with abrupt standing for lying down to go to bed.  Only lasts for a very short period of time and there is no other associated symptoms.  He does not feel the symptoms are bad enough at the present time to warrant vestibular therapy.   • History of colon polyps, 01/14/2016--normal study. 2010--serrated adenoma ×1.   5/10/2010    01/14/2016--normal colonoscopy.  Recommended repeat study in 10 years.  05/10/2010--colonoscopy revealed a 3 mm cecal polyp that was removed completely. The remainder of the colonoscopy was normal. Recommend repeat colonoscopy in 5 years. If the polyp is adenomatous, would recommend all first-degree relatives over age 40 be in screening as well. Pathology returned serrated adenoma.   • History of pneumonia 12/06/2014 12/06/2014--patient seen in follow up and reports he is definitely better but he is still having a cough that seems to come and go. This been no fever. He still has some shortness of breath but this is better. Lung examination reveals continued bibasilar crackles but they are somewhat improved over previous. I reviewed the chest x-ray results and even though the chest x-ray shows no definite pneum   • History of Thoracic compression fracture 03/28/2004 03/28/2004--patient fell and suffered a moderate T12 compression fracture. No subluxation or retropulsion of fracture fragments is seen. Approximately 40% loss of anterior vertebral body height.   • Hyperlipidemia 4/29/2016   • Hypogonadism male, 08/14/2018--TRT initiated. 6/10/2013    02/21/2014--treatment for hypothyroidism discontinued. Patient did not feel any better with the testosterone. It also is extremely expensive.   06/10/2013--treatment for hypogonadism begun.      • Hypothyroidism 4/29/2016   • Impaired fasting glucose 4/29/2016   • Male erectile disorder 11/23/2015 11/23/2015--patient presents with complaints of erectile dysfunction. He does have borderline hypogonadism but previous treatment was not particularly effective. I gave him samples of Stendra, Viagra, Staxyn.   • Mood disorder (CMS/[facility]) 1/30/2019   • Multiple environmental allergies 4/29/2016    Patient reports he had allergy testing several years ago in his mid 20s. He reports allergies to molds. He was on immunotherapy for approximately one year and then this was discontinued. He was told he did not need it.   • Obstructive sleep apnea, 09/08/2005--AHI 18.2.  Oxygen saturation 81%.  Patient cannot tolerate CPAP. 9/8/2005 09/08/2005--sleep study revealed an apnea/hypopnea index for total sleep time to be moderately abnormal at 18.2. Lowest oxygen saturation 81%. CPAP was tried but patient is intolerant.   • Vitamin D deficiency 4/29/2016         Past Surgical History:   Procedure Laterality Date   • ANKLE SURGERY Left 2004 2004--repair of left ankle fracture with hardware placement.   • CARPAL TUNNEL RELEASE Right 1989 1989--right carpal tunnel release.   • COLONOSCOPY  05/10/2010    05/10/2010--colonoscopy revealed a 3 mm cecal polyp that was removed completely. The remainder of the colonoscopy was normal. Recommend repeat colonoscopy in 5 years. If the polyp is adenomatous, would recommend all first-degree relatives over age 40 be in screening as well. Pathology returned serrated adenoma.   • COLONOSCOPY  01/14/2016 01/14/2016--normal colonoscopy.  Recommended repeat study in 10 years.   • LAPAROSCOPIC CHOLECYSTECTOMY  04/03/2012 04/03/2012--laparoscopic cholecystectomy. Pathology returned cholesterolosis.   • TONSILLECTOMY  Childhood    Childhood tonsillectomy         No Known Allergies        Current Outpatient Medications:   •  aspirin 81  MG tablet, Take 1 tablet by mouth Daily., Disp: , Rfl:   •  Cholecalciferol (VITAMIN D3) 5000 UNITS capsule capsule, Take 1 capsule by mouth daily., Disp: , Rfl:   •  ezetimibe (ZETIA) 10 MG tablet, TAKE 1 TABLET DAILY, Disp: 90 tablet, Rfl: 0  •  fluticasone (FLONASE) 50 MCG/ACT nasal spray, PLACE TWO SPRAYS IN EACH NOSTRIL ONCE DAILY, Disp: 16 g, Rfl: 8  •  levothyroxine (SYNTHROID) 150 MCG tablet, One by mouth daily for low thyroid, Disp: 90 tablet, Rfl: 3  •  OXcarbazepine (TRILEPTAL) 300 MG tablet, 1 tablet Daily., Disp: , Rfl:   •  simvastatin (ZOCOR) 40 MG tablet, TAKE 1 TABLET EVERY NIGHT (NEED LABS AND FOLLOW UP AS SOON AS POSSIBLE), Disp: 90 tablet, Rfl: 0  •  Testosterone (ANDROGEL PUMP) 20.25 MG/ACT (1.62%) gel, Apply a total of 2 pumps daily as directed, Disp: 75 g, Rfl: 5      Family History   Problem Relation Age of Onset   • Cancer Father         Liver Cancer. Father  from liver cancer.   • Heart attack Brother         Acute Myocardial Infarction.  Brother had a myocardial infarction at age 68.         Social History     Socioeconomic History   • Marital status:      Spouse name: Not on file   • Number of children: 2   • Years of education: Not on file   • Highest education level: 12th grade   Social Needs   • Financial resource strain: Not hard at all   • Food insecurity - worry: Never true   • Food insecurity - inability: Never true   • Transportation needs - medical: No   • Transportation needs - non-medical: No   Occupational History   • Occupation: Retired - [employer]   Tobacco Use   • Smoking status: Former Smoker   • Smokeless tobacco: Never Used   • Tobacco comment: Stopped smoking at age 35.   Substance and Sexual Activity   • Alcohol use: Yes     Frequency: Monthly or less     Drinks per session: 1 or 2     Comment: Moderately   • Drug use: No   • Sexual activity: Yes     Partners: Female   Other Topics Concern   • Not on file   Social History Narrative   • Not on file  "        Vitals:    01/30/19 0744   BP: 120/70   Pulse: 76   SpO2: 98%   Weight: 108 kg (237 lb)   Height: 172.7 cm (67.99\")          Physical Exam:    General: Alert and oriented x 3.  No acute distress.  Normal affect. Obese. HEENT: Pupils equal, round, reactive to light; extraocular movements intact; sclerae nonicteric; pharynx, ear canals and TMs normal.  Neck: Without JVD, thyromegaly, bruit, or adenopathy.  Lungs: Clear to auscultation in all fields.  Heart: Regular rate and rhythm without murmur, rub, gallop, or click.  Abdomen: Soft, nontender, without hepatosplenomegaly or hernia.  Bowel sounds normal.  : Deferred.  Rectal: Deferred.  Extremities: Without clubbing, cyanosis, edema, or pulse deficit.  Neurologic: Intact without focal deficit.  Normal station and gait observed during ingress and egress from the examination room.  Skin: Without significant lesion.  Musculoskeletal: Unremarkable.    Lab/other results:    NMR is perfect except HDL particle number is low at 30.1 and small LDL particle number slightly elevated at 627.  Total cholesterol is only 113 and LDL particle number is excellent at 818.  CMP normal except blood sugar 131.  CBC is normal.  Testosterone levels are perfect.  Hemoglobin A1c 6.33.  Thyroid function tests normal.  PSA normal.  Vitamin D normal.  CPK normal.    Assessment/Plan:     Diagnosis Plan   1. Impaired fasting glucose     2. Hypothyroidism, unspecified type     3. Hypogonadism male, 08/14/2018--TRT initiated.     4. Hyperlipidemia, unspecified hyperlipidemia type     5. Gastroesophageal reflux disease without esophagitis     6. History of colon polyps, 01/14/2016--normal study. 2010--serrated adenoma ×1.       7. Multiple environmental allergies     8. Obstructive sleep apnea, 09/08/2005--AHI 18.2.  Oxygen saturation 81%.  Patient cannot tolerate CPAP.     9. Benign prostatic hypertrophy     10. Vitamin D deficiency     11. Therapeutic drug monitoring     12. Routine " physical examination     13. Morbidly obese (CMS/HCC)     14. Mood disorder (CMS/HCC)       Patient has impaired fasting glucose at does not require medication.  He is approaching mild overt diabetes and we discussed losing weight and watching carbohydrate intake.  Thyroid is therapeutic.  Testosterone levels are perfect.  Hyperlipidemia is under good control.  Reflux has not been an issue.  Environmental allergies have not been an issue.  Patient does have sleep apnea which is mildly cannot tolerate CPAP.  The benign prostatic hypertrophy is asymptomatic.  Vitamin D therapeutic.    Plan is as follows: No change in current medical regimen.  Patient will follow-up after 08/14/2019 with lab prior and this will be his subsequent Medicare wellness visit as well as [employer] Medicare replacement physical.        Procedures

## 2019-03-06 ENCOUNTER — TELEPHONE (OUTPATIENT)
Dept: INTERNAL MEDICINE | Facility: CLINIC | Age: 69
End: 2019-03-06

## 2019-03-06 NOTE — TELEPHONE ENCOUNTER
Pt called C/O sore throat and sinus drainage. I told him that he will have to go to [facility] because we have no openings until Friday. He said that he would go to .

## 2019-04-20 DIAGNOSIS — E78.5 HYPERLIPIDEMIA, UNSPECIFIED HYPERLIPIDEMIA TYPE: Chronic | ICD-10-CM

## 2019-04-22 RX ORDER — SIMVASTATIN 40 MG
TABLET ORAL
Qty: 90 TABLET | Refills: 1 | Status: SHIPPED | OUTPATIENT
Start: 2019-04-22 | End: 2019-08-15 | Stop reason: SDUPTHER

## 2019-04-22 RX ORDER — EZETIMIBE 10 MG/1
TABLET ORAL
Qty: 90 TABLET | Refills: 1 | Status: SHIPPED | OUTPATIENT
Start: 2019-04-22 | End: 2019-11-09 | Stop reason: SDUPTHER

## 2019-04-29 ENCOUNTER — OFFICE VISIT (OUTPATIENT)
Dept: INTERNAL MEDICINE | Facility: CLINIC | Age: 69
End: 2019-04-29

## 2019-04-29 ENCOUNTER — HOSPITAL ENCOUNTER (OUTPATIENT)
Dept: GENERAL RADIOLOGY | Facility: HOSPITAL | Age: 69
Discharge: HOME | End: 2019-04-29
Admitting: INTERNAL MEDICINE

## 2019-04-29 VITALS
WEIGHT: 238 LBS | SYSTOLIC BLOOD PRESSURE: 112 MMHG | BODY MASS INDEX: 36.07 KG/M2 | DIASTOLIC BLOOD PRESSURE: 62 MMHG | HEART RATE: 69 BPM | HEIGHT: 68 IN | OXYGEN SATURATION: 98 %

## 2019-04-29 DIAGNOSIS — G89.29 CHRONIC RIGHT SHOULDER PAIN: Primary | ICD-10-CM

## 2019-04-29 DIAGNOSIS — M25.511 CHRONIC RIGHT SHOULDER PAIN: Primary | ICD-10-CM

## 2019-04-29 DIAGNOSIS — M25.511 CHRONIC RIGHT SHOULDER PAIN: ICD-10-CM

## 2019-04-29 DIAGNOSIS — G89.29 CHRONIC RIGHT SHOULDER PAIN: ICD-10-CM

## 2019-04-29 PROCEDURE — 73030 X-RAY EXAM OF SHOULDER: CPT

## 2019-04-29 PROCEDURE — 99213 OFFICE O/P EST LOW 20 MIN: CPT | Performed by: INTERNAL MEDICINE

## 2019-04-29 NOTE — PROGRESS NOTES
04/29/2019    Patient Information  Nilson Buchanan                                                                                          [address] [facility] [address]      1950  [unfilled]  There is no work phone number on file.    Chief Complaint:     Complaining of right shoulder pain after injury.    History of Present Illness:    Patient with history of impaired fasting glucose, hypothyroidism, sleep apnea, BPH, esophageal reflux and colon polyps.  He presents today with complaints of right shoulder pain as described below.  Past medical history reviewed and updated were necessary including health maintenance parameters.  This reveals he is up-to-date or else accounted for.    The history regarding traumatic right shoulder pain:    April 29, 2019--patient presents with approximately 2-month history of right shoulder pain that began when he injured it jumping out of his pickup truck.  This resulted in a pulling injury.  Since that time he has had pain with certain movements such as abduction and raising his arm above his head.  He can no longer raise his arm and place his hand behind his head.  Examination reveals definite decreased range of motion and weakness with extension and abduction.  X-ray of the right shoulder and MRI of the right shoulder ordered.  Suspect internal derangement.    Review of Systems   Constitution: Negative.   HENT: Negative.    Eyes: Negative.    Cardiovascular: Negative.    Respiratory: Negative.    Endocrine: Negative.    Hematologic/Lymphatic: Negative.    Skin: Negative.    Musculoskeletal: Negative.         Right shoulder pain and weakness   Gastrointestinal: Negative.    Genitourinary: Negative.    Neurological: Negative.    Psychiatric/Behavioral: Negative.    Allergic/Immunologic: Negative.        Active Problems:    Patient Active Problem List   Diagnosis   • Allergic rhinitis   • History of colon polyps, 01/14/2016--normal study.  2010--serrated adenoma ×1.     • Gastroesophageal reflux disease without esophagitis   • Hyperlipidemia   • Hypogonadism male, 08/14/2018--TRT initiated.   • Hypothyroidism   • Impaired fasting glucose   • Male erectile disorder   • Multiple environmental allergies   • Obstructive sleep apnea, 09/08/2005--AHI 18.2.  Oxygen saturation 81%.  Patient cannot tolerate CPAP.   • Vitamin D deficiency   • Therapeutic drug monitoring   • Morbidly obese (CMS/[facility])   • Benign prostatic hypertrophy   • Routine physical examination   • Mood disorder (CMS/[facility])   • Chronic right shoulder pain         Past Medical History:   Diagnosis Date   • Allergic rhinitis 2/21/2014    Patient reports he had allergy testing several years ago in his mid 20s. He reports allergies to molds. He was on immunotherapy for approximately one year and then this was discontinued. He was told he did not need it.   02/21/2014--Flonase 2 sprays each nostril daily initiated.   • Benign prostatic hypertrophy 2/9/2017 02/29/2017--patient reports sudden onset a few days ago with dysuria and BPH obstructive symptoms.  He had intercourse recently and after that begin to have burning with urination which has continued.  He subsequently developed intermittent obstructive symptoms consisting of urgency, weak stream, dribbling and sensation of incomplete voiding.  Digital rectal examination avoided today.  Urinalysis, urine culture, PSA ordered.  Empiric Cipro 500 mg by mouth twice a day ×30 days, Flomax 0.4 mg by mouth daily.  I will have patient follow-up after completion of antibiotics with a PSA 2 days after completion of the antibiotics providing his PSA comes back elevated or above baseline.   • Gastroesophageal reflux disease without esophagitis 4/29/2016   • History of acute prostatitis 2/9/2017    03/10/2017--patient seen in follow-up and his symptoms resolved.  PSA is 0.630.  PSA at the initiation of treatment was 1.03.  Urinalysis and urine culture  returned negative.  02/29/2017--patient reports sudden onset a few days ago with dysuria and BPH obstructive symptoms.  He had intercourse recently and after that begin to have burning with urination which has continued.  He subsequently developed intermittent obstructive symptoms consisting of urgency, weak stream, dribbling and sensation of incomplete voiding.  Digital rectal examination avoided today.  Urinalysis, urine culture, PSA ordered.  Empiric Cipro 500 mg by mouth twice a day ×30 days, Flomax 0.4 mg by mouth daily.  I will have patient follow-up after completion of antibiotics with a PSA 2 days after completion of the antibiotics providing his PSA comes back elevated or above baseline.   • History of BPPV (benign paroxysmal positional vertigo) 6/20/2016 12/19/2016--patient reports symptoms have resolved.  06/20/2016--patient presents with a new complaint of a several week history of intermittent episodes of dizziness described as a spinning sensation.  It is always associated with abrupt standing for lying down to go to bed.  Only lasts for a very short period of time and there is no other associated symptoms.  He does not feel the symptoms are bad enough at the present time to warrant vestibular therapy.   • History of colon polyps, 01/14/2016--normal study. 2010--serrated adenoma ×1.   5/10/2010    01/14/2016--normal colonoscopy.  Recommended repeat study in 10 years.  05/10/2010--colonoscopy revealed a 3 mm cecal polyp that was removed completely. The remainder of the colonoscopy was normal. Recommend repeat colonoscopy in 5 years. If the polyp is adenomatous, would recommend all first-degree relatives over age 40 be in screening as well. Pathology returned serrated adenoma.   • History of pneumonia 12/06/2014 12/06/2014--patient seen in follow up and reports he is definitely better but he is still having a cough that seems to come and go. This been no fever. He still has some shortness of breath  but this is better. Lung examination reveals continued bibasilar crackles but they are somewhat improved over previous. I reviewed the chest x-ray results and even though the chest x-ray shows no definite pneum   • History of Thoracic compression fracture 03/28/2004 03/28/2004--patient fell and suffered a moderate T12 compression fracture. No subluxation or retropulsion of fracture fragments is seen. Approximately 40% loss of anterior vertebral body height.   • Hyperlipidemia 4/29/2016   • Hypogonadism male, 08/14/2018--TRT initiated. 6/10/2013    02/21/2014--treatment for hypothyroidism discontinued. Patient did not feel any better with the testosterone. It also is extremely expensive.  06/10/2013--treatment for hypogonadism begun.      • Hypothyroidism 4/29/2016   • Impaired fasting glucose 4/29/2016   • Male erectile disorder 11/23/2015 11/23/2015--patient presents with complaints of erectile dysfunction. He does have borderline hypogonadism but previous treatment was not particularly effective. I gave him samples of Stendra, Viagra, Staxyn.   • Mood disorder (CMS/[facility]) 1/30/2019   • Multiple environmental allergies 4/29/2016    Patient reports he had allergy testing several years ago in his mid 20s. He reports allergies to molds. He was on immunotherapy for approximately one year and then this was discontinued. He was told he did not need it.   • Obstructive sleep apnea, 09/08/2005--AHI 18.2.  Oxygen saturation 81%.  Patient cannot tolerate CPAP. 9/8/2005 09/08/2005--sleep study revealed an apnea/hypopnea index for total sleep time to be moderately abnormal at 18.2. Lowest oxygen saturation 81%. CPAP was tried but patient is intolerant.   • Vitamin D deficiency 4/29/2016         Past Surgical History:   Procedure Laterality Date   • ANKLE SURGERY Left 2004 2004--repair of left ankle fracture with hardware placement.   • CARPAL TUNNEL RELEASE Right 1989 1989--right carpal tunnel release.   •  COLONOSCOPY  05/10/2010    05/10/2010--colonoscopy revealed a 3 mm cecal polyp that was removed completely. The remainder of the colonoscopy was normal. Recommend repeat colonoscopy in 5 years. If the polyp is adenomatous, would recommend all first-degree relatives over age 40 be in screening as well. Pathology returned serrated adenoma.   • COLONOSCOPY  2016--normal colonoscopy.  Recommended repeat study in 10 years.   • LAPAROSCOPIC CHOLECYSTECTOMY  2012--laparoscopic cholecystectomy. Pathology returned cholesterolosis.   • TONSILLECTOMY  Childhood    Childhood tonsillectomy         Allergies   Allergen Reactions   • Cortisone Other (See Comments)     Locks up patient jjoint           Current Outpatient Medications:   •  aspirin 81 MG tablet, Take 1 tablet by mouth Daily., Disp: , Rfl:   •  Cholecalciferol (VITAMIN D3) 5000 UNITS capsule capsule, Take 1 capsule by mouth daily., Disp: , Rfl:   •  ezetimibe (ZETIA) 10 MG tablet, TAKE 1 TABLET DAILY, Disp: 90 tablet, Rfl: 1  •  fluticasone (FLONASE) 50 MCG/ACT nasal spray, PLACE TWO SPRAYS IN EACH NOSTRIL ONCE DAILY, Disp: 16 g, Rfl: 8  •  levothyroxine (SYNTHROID) 150 MCG tablet, One by mouth daily for low thyroid, Disp: 90 tablet, Rfl: 3  •  OXcarbazepine (TRILEPTAL) 300 MG tablet, Take 1 by mouth daily, Disp: 30 tablet, Rfl: 6  •  simvastatin (ZOCOR) 40 MG tablet, TAKE 1 TABLET EVERY NIGHT (NEED LABS AND FOLLOW UP AS SOON AS POSSIBLE), Disp: 90 tablet, Rfl: 1  •  Testosterone (ANDROGEL PUMP) 20.25 MG/ACT (1.62%) gel, Apply a total of 2 pumps daily as directed, Disp: 75 g, Rfl: 5      Family History   Problem Relation Age of Onset   • Cancer Father         Liver Cancer. Father  from liver cancer.   • Heart attack Brother         Acute Myocardial Infarction.  Brother had a myocardial infarction at age 68.         Social History     Socioeconomic History   • Marital status:      Spouse name: Not on file   • Number  "of children: 2   • Years of education: Not on file   • Highest education level: 12th grade   Occupational History   • Occupation: Retired - [employer]   Social Needs   • Financial resource strain: Not hard at all   • Food insecurity:     Worry: Never true     Inability: Never true   • Transportation needs:     Medical: No     Non-medical: No   Tobacco Use   • Smoking status: Former Smoker   • Smokeless tobacco: Never Used   • Tobacco comment: Stopped smoking at age 35.   Substance and Sexual Activity   • Alcohol use: Yes     Frequency: Monthly or less     Drinks per session: 1 or 2     Comment: Moderately   • Drug use: No   • Sexual activity: Yes     Partners: Female   Lifestyle   • Physical activity:     Days per week: 7 days     Minutes per session: 120 min   • Stress: Not at all   Relationships   • Social connections:     Talks on phone: More than three times a week     Gets together: Three times a week     Attends Spiritism service: More than 4 times per year     Active member of club or organization: No     Attends meetings of clubs or organizations: Never     Relationship status:          Vitals:    04/29/19 0653   BP: 112/62   BP Location: Right arm   Pulse: 69   SpO2: 98%   Weight: 108 kg (238 lb)   Height: 172 cm (67.72\")          Physical Exam:    General: Alert and oriented x 3.  No acute distress.  Normal affect.  HEENT: Pupils equal, round, reactive to light; extraocular movements intact; sclerae nonicteric; pharynx, ear canals and TMs normal.  Neck: Without JVD, thyromegaly, bruit, or adenopathy.  Lungs: Clear to auscultation in all fields.  Heart: Regular rate and rhythm without murmur, rub, gallop, or click.  Abdomen: Soft, nontender, without hepatosplenomegaly or hernia.  Bowel sounds normal.  : Deferred.  Rectal: Deferred.  Extremities: Without clubbing, cyanosis, edema, or pulse deficit.  Neurologic: Intact without focal deficit.  Normal station and gait observed during ingress and egress from " the examination room.  Skin: Without significant lesion.  Musculoskeletal: No deformity noted.  There is definite decreased range of motion with weakness, particularly in abduction and elevation of the right arm    Lab/other results:      Assessment/Plan:     Diagnosis Plan   1. Chronic right shoulder pain       Patient presents with traumatic chronic right shoulder pain as described above.  I am concerned about internal derangement.    Plan is as follows: X-ray of the right shoulder.  MRI of the right shoulder.  Samples of Vimovo 500/20, 1 p.o. twice daily.  Patient will follow up on the phone for the results and possible further instructions.        Procedures

## 2019-05-01 ENCOUNTER — HOSPITAL ENCOUNTER (OUTPATIENT)
Dept: MRI IMAGING | Facility: HOSPITAL | Age: 69
Discharge: HOME | End: 2019-05-01
Admitting: INTERNAL MEDICINE

## 2019-05-01 DIAGNOSIS — M25.511 CHRONIC RIGHT SHOULDER PAIN: ICD-10-CM

## 2019-05-01 DIAGNOSIS — G89.29 CHRONIC RIGHT SHOULDER PAIN: ICD-10-CM

## 2019-05-01 PROCEDURE — 73221 MRI JOINT UPR EXTREM W/O DYE: CPT

## 2019-05-03 ENCOUNTER — TELEPHONE (OUTPATIENT)
Dept: INTERNAL MEDICINE | Facility: CLINIC | Age: 69
End: 2019-05-03

## 2019-05-07 ENCOUNTER — TELEPHONE (OUTPATIENT)
Dept: INTERNAL MEDICINE | Facility: CLINIC | Age: 69
End: 2019-05-07

## 2019-05-07 DIAGNOSIS — M25.511 CHRONIC RIGHT SHOULDER PAIN: ICD-10-CM

## 2019-05-07 DIAGNOSIS — G89.29 CHRONIC RIGHT SHOULDER PAIN: ICD-10-CM

## 2019-05-07 DIAGNOSIS — M75.121 COMPLETE TEAR OF RIGHT ROTATOR CUFF: Primary | ICD-10-CM

## 2019-05-07 NOTE — TELEPHONE ENCOUNTER
Patient has a complete of the rotator cuff supraspinatus tendon.  I have referred him to Dr. Thanh Ventura an orthopedist who specializes in shoulders.

## 2019-08-06 DIAGNOSIS — E29.1 HYPOGONADISM MALE: Chronic | ICD-10-CM

## 2019-08-07 DIAGNOSIS — E55.9 VITAMIN D DEFICIENCY: Chronic | ICD-10-CM

## 2019-08-07 DIAGNOSIS — E78.5 HYPERLIPIDEMIA, UNSPECIFIED HYPERLIPIDEMIA TYPE: Chronic | ICD-10-CM

## 2019-08-07 DIAGNOSIS — Z51.81 THERAPEUTIC DRUG MONITORING: ICD-10-CM

## 2019-08-07 DIAGNOSIS — E03.9 HYPOTHYROIDISM, UNSPECIFIED TYPE: Chronic | ICD-10-CM

## 2019-08-07 DIAGNOSIS — R73.01 IMPAIRED FASTING GLUCOSE: Chronic | ICD-10-CM

## 2019-08-07 DIAGNOSIS — N40.1 BENIGN NON-NODULAR PROSTATIC HYPERPLASIA WITH LOWER URINARY TRACT SYMPTOMS: Chronic | ICD-10-CM

## 2019-08-07 RX ORDER — TESTOSTERONE 16.2 MG/G
GEL TRANSDERMAL
Qty: 75 G | Refills: 5 | OUTPATIENT
Start: 2019-08-07 | End: 2020-08-19 | Stop reason: SDUPTHER

## 2019-08-13 LAB
25(OH)D3+25(OH)D2 SERPL-MCNC: 37.5 NG/ML (ref 30–100)
ALBUMIN SERPL-MCNC: 4.2 G/DL (ref 3.5–5.2)
ALBUMIN/GLOB SERPL: 1.7 G/DL
ALP SERPL-CCNC: 66 U/L (ref 39–117)
ALT SERPL-CCNC: 25 U/L (ref 1–41)
AST SERPL-CCNC: 22 U/L (ref 1–40)
BILIRUB SERPL-MCNC: 0.8 MG/DL (ref 0.2–1.2)
BUN SERPL-MCNC: 20 MG/DL (ref 8–23)
BUN/CREAT SERPL: 14.9 (ref 7–25)
CALCIUM SERPL-MCNC: 9.1 MG/DL (ref 8.6–10.5)
CHLORIDE SERPL-SCNC: 102 MMOL/L (ref 98–107)
CHOLEST SERPL-MCNC: 131 MG/DL (ref 100–199)
CK SERPL-CCNC: 119 U/L (ref 20–200)
CO2 SERPL-SCNC: 25.1 MMOL/L (ref 22–29)
CREAT SERPL-MCNC: 1.34 MG/DL (ref 0.76–1.27)
ERYTHROCYTE [DISTWIDTH] IN BLOOD BY AUTOMATED COUNT: 12.9 % (ref 12.3–15.4)
GLOBULIN SER CALC-MCNC: 2.5 GM/DL
GLUCOSE SERPL-MCNC: 124 MG/DL (ref 65–99)
HBA1C MFR BLD: 6.3 % (ref 4.8–5.6)
HCT VFR BLD AUTO: 45.7 % (ref 37.5–51)
HDL SERPL-SCNC: 32.1 UMOL/L
HDLC SERPL-MCNC: 34 MG/DL
HGB BLD-MCNC: 14.8 G/DL (ref 13–17.7)
LDL SERPL QN: 19.8 NM
LDL SERPL-SCNC: 1004 NMOL/L
LDL SMALL SERPL-SCNC: 782 NMOL/L
LDLC SERPL CALC-MCNC: 56 MG/DL (ref 0–99)
MCH RBC QN AUTO: 31.3 PG (ref 26.6–33)
MCHC RBC AUTO-ENTMCNC: 32.4 G/DL (ref 31.5–35.7)
MCV RBC AUTO: 96.6 FL (ref 79–97)
PLATELET # BLD AUTO: 226 10*3/MM3 (ref 140–450)
POTASSIUM SERPL-SCNC: 4.6 MMOL/L (ref 3.5–5.2)
PROT SERPL-MCNC: 6.7 G/DL (ref 6–8.5)
PSA SERPL-MCNC: 1.05 NG/ML (ref 0–4)
RBC # BLD AUTO: 4.73 10*6/MM3 (ref 4.14–5.8)
SODIUM SERPL-SCNC: 141 MMOL/L (ref 136–145)
T3FREE SERPL-MCNC: 2.6 PG/ML (ref 2–4.4)
T4 FREE SERPL-MCNC: 1.28 NG/DL (ref 0.93–1.7)
TESTOST SERPL-MCNC: 111 NG/DL (ref 264–916)
TESTOSTERONE.FREE+WB MFR SERPL: 20.1 % (ref 9–46)
TESTOSTERONE.FREE+WB SERPL-MCNC: 22.3 NG/DL (ref 40–250)
TRIGL SERPL-MCNC: 204 MG/DL (ref 0–149)
TSH SERPL DL<=0.005 MIU/L-ACNC: 3.53 MIU/ML (ref 0.27–4.2)
WBC # BLD AUTO: 6.13 10*3/MM3 (ref 3.4–10.8)

## 2019-08-15 ENCOUNTER — OFFICE VISIT (OUTPATIENT)
Dept: INTERNAL MEDICINE | Facility: CLINIC | Age: 69
End: 2019-08-15

## 2019-08-15 VITALS
WEIGHT: 238 LBS | OXYGEN SATURATION: 98 % | HEIGHT: 68 IN | DIASTOLIC BLOOD PRESSURE: 68 MMHG | HEART RATE: 68 BPM | SYSTOLIC BLOOD PRESSURE: 118 MMHG | BODY MASS INDEX: 36.07 KG/M2

## 2019-08-15 DIAGNOSIS — G47.33 OBSTRUCTIVE SLEEP APNEA: Chronic | ICD-10-CM

## 2019-08-15 DIAGNOSIS — Z00.00 MEDICARE ANNUAL WELLNESS VISIT, SUBSEQUENT: Primary | ICD-10-CM

## 2019-08-15 DIAGNOSIS — E03.9 HYPOTHYROIDISM, UNSPECIFIED TYPE: Chronic | ICD-10-CM

## 2019-08-15 DIAGNOSIS — Z51.81 THERAPEUTIC DRUG MONITORING: ICD-10-CM

## 2019-08-15 DIAGNOSIS — E29.1 HYPOGONADISM MALE: Chronic | ICD-10-CM

## 2019-08-15 DIAGNOSIS — E78.5 HYPERLIPIDEMIA, UNSPECIFIED HYPERLIPIDEMIA TYPE: Chronic | ICD-10-CM

## 2019-08-15 DIAGNOSIS — Z91.09 MULTIPLE ENVIRONMENTAL ALLERGIES: Chronic | ICD-10-CM

## 2019-08-15 DIAGNOSIS — E66.01 MORBIDLY OBESE (HCC): Chronic | ICD-10-CM

## 2019-08-15 DIAGNOSIS — Z86.010 HISTORY OF COLON POLYPS: Chronic | ICD-10-CM

## 2019-08-15 DIAGNOSIS — R73.01 IMPAIRED FASTING GLUCOSE: Chronic | ICD-10-CM

## 2019-08-15 DIAGNOSIS — N40.1 BENIGN NON-NODULAR PROSTATIC HYPERPLASIA WITH LOWER URINARY TRACT SYMPTOMS: Chronic | ICD-10-CM

## 2019-08-15 DIAGNOSIS — K21.9 GASTROESOPHAGEAL REFLUX DISEASE WITHOUT ESOPHAGITIS: Chronic | ICD-10-CM

## 2019-08-15 DIAGNOSIS — E55.9 VITAMIN D DEFICIENCY: Chronic | ICD-10-CM

## 2019-08-15 PROCEDURE — 96160 PT-FOCUSED HLTH RISK ASSMT: CPT | Performed by: INTERNAL MEDICINE

## 2019-08-15 PROCEDURE — 99214 OFFICE O/P EST MOD 30 MIN: CPT | Performed by: INTERNAL MEDICINE

## 2019-08-15 PROCEDURE — G0439 PPPS, SUBSEQ VISIT: HCPCS | Performed by: INTERNAL MEDICINE

## 2019-08-15 RX ORDER — SIMVASTATIN 40 MG
TABLET ORAL
Qty: 90 TABLET | Refills: 1 | Status: SHIPPED | OUTPATIENT
Start: 2019-08-15 | End: 2019-11-09 | Stop reason: SDUPTHER

## 2019-08-15 NOTE — PROGRESS NOTES
The ABCs of the Annual Wellness Visit  Subsequent Medicare Wellness Visit    No chief complaint on file.      Subjective   History of Present Illness:  Nilson Buchanan is a 69 y.o. male who presents for a Subsequent Medicare Wellness Visit.    HEALTH RISK ASSESSMENT    Recent Hospitalizations:  No hospitalization(s) within the last year.    Current Medical Providers:  Patient Care Team:  Marco Kenney MD as PCP - General (Internal Medicine)    Smoking Status:  Social History     Tobacco Use   Smoking Status Former Smoker   Smokeless Tobacco Never Used   Tobacco Comment    Stopped smoking at age 35.       Alcohol Consumption:  Social History     Substance and Sexual Activity   Alcohol Use Yes   • Frequency: Monthly or less   • Drinks per session: 1 or 2    Comment: Moderately       Depression Screen:   PHQ-2/PHQ-9 Depression Screening 8/15/2019   Little interest or pleasure in doing things 0   Feeling down, depressed, or hopeless 0   Trouble falling or staying asleep, or sleeping too much -   Feeling tired or having little energy -   Poor appetite or overeating -   Feeling bad about yourself - or that you are a failure or have let yourself or your family down -   Trouble concentrating on things, such as reading the newspaper or watching television -   Moving or speaking so slowly that other people could have noticed. Or the opposite - being so fidgety or restless that you have been moving around a lot more than usual -   Thoughts that you would be better off dead, or of hurting yourself in some way -   Total Score 0   If you checked off any problems, how difficult have these problems made it for you to do your work, take care of things at home, or get along with other people? -       Fall Risk Screen:  STEADI Fall Risk Assessment was completed, and patient is at LOW risk for falls.Assessment completed on:8/15/2019    Health Habits and Functional and Cognitive Screening:  Functional & Cognitive Status 8/15/2019    Do you have difficulty preparing food and eating? No   Do you have difficulty bathing yourself, getting dressed or grooming yourself? No   Do you have difficulty using the toilet? No   Do you have difficulty moving around from place to place? No   Do you have trouble with steps or getting out of a bed or a chair? No   Current Diet Well Balanced Diet   Dental Exam Up to date   Eye Exam Up to date   Exercise (times per week) 5 times per week   Current Exercise Activities Include Walking   Do you need help using the phone?  No   Are you deaf or do you have serious difficulty hearing?  No   Do you need help with transportation? No   Do you need help shopping? No   Do you need help preparing meals?  No   Do you need help with housework?  No   Do you need help with laundry? No   Do you need help taking your medications? No   Do you need help managing money? No   Do you ever drive or ride in a car without wearing a seat belt? No   Have you felt unusual stress, anger or loneliness in the last month? No   Who do you live with? Alone   If you need help, do you have trouble finding someone available to you? No   Have you been bothered in the last four weeks by sexual problems? No   Do you have difficulty concentrating, remembering or making decisions? No         Does the patient have evidence of cognitive impairment? No    Asprin use counseling:Taking ASA appropriately as indicated    Age-appropriate Screening Schedule:  Refer to the list below for future screening recommendations based on patient's age, sex and/or medical conditions. Orders for these recommended tests are listed in the plan section. The patient has been provided with a written plan.    Health Maintenance   Topic Date Due   • INFLUENZA VACCINE  08/01/2019   • LIPID PANEL  08/08/2020   • COLONOSCOPY  01/14/2021   • TDAP/TD VACCINES (2 - Td) 10/18/2023   • PNEUMOCOCCAL VACCINES (65+ LOW/MEDIUM RISK)  Completed   • ZOSTER VACCINE  Discontinued          The  following portions of the patient's history were reviewed and updated as appropriate: allergies, current medications, past family history, past medical history, past social history, past surgical history and problem list.    Outpatient Medications Prior to Visit   Medication Sig Dispense Refill   • aspirin 81 MG tablet Take 1 tablet by mouth Daily.     • Cholecalciferol (VITAMIN D3) 5000 UNITS capsule capsule Take 1 capsule by mouth daily.     • ezetimibe (ZETIA) 10 MG tablet TAKE 1 TABLET DAILY 90 tablet 1   • fluticasone (FLONASE) 50 MCG/ACT nasal spray PLACE TWO SPRAYS IN EACH NOSTRIL ONCE DAILY 16 g 8   • levothyroxine (SYNTHROID) 150 MCG tablet One by mouth daily for low thyroid 90 tablet 3   • Testosterone (ANDROGEL PUMP) 20.25 MG/ACT (1.62%) gel Apply a total of 2 pumps daily as directed 75 g 5   • simvastatin (ZOCOR) 40 MG tablet TAKE 1 TABLET EVERY NIGHT (NEED LABS AND FOLLOW UP AS SOON AS POSSIBLE) 90 tablet 1   • OXcarbazepine (TRILEPTAL) 300 MG tablet Take 1 by mouth daily 30 tablet 6     No facility-administered medications prior to visit.        Patient Active Problem List   Diagnosis   • Allergic rhinitis   • History of colon polyps, 01/14/2016--normal study. 2010--serrated adenoma ×1.     • Gastroesophageal reflux disease without esophagitis   • Hyperlipidemia   • Hypogonadism male, 08/14/2018--TRT initiated.   • Hypothyroidism   • Impaired fasting glucose   • Male erectile disorder   • Multiple environmental allergies   • Obstructive sleep apnea, 09/08/2005--AHI 18.2.  Oxygen saturation 81%.  Patient cannot tolerate CPAP.   • Vitamin D deficiency   • Therapeutic drug monitoring   • Morbidly obese (CMS/HCC)   • Benign prostatic hypertrophy   • Routine physical examination   • Mood disorder (CMS/HCC)   • Chronic right shoulder pain   • Complete tear of right rotator cuff       Advanced Care Planning:  Patient has an advance directive - a copy has not been provided. Have asked the patient to send this  "to us to add to record    Review of Systems   Constitutional: Negative.    HENT: Negative.    Eyes: Negative.    Respiratory: Negative.    Cardiovascular: Negative.    Gastrointestinal: Negative.    Endocrine: Negative.    Genitourinary:        Nocturia x3   Musculoskeletal: Negative.         Right shoulder pain   Skin: Negative.    Allergic/Immunologic: Negative.    Neurological: Negative.    Hematological: Negative.    Psychiatric/Behavioral: Negative.        Compared to one year ago, the patient feels his physical health is better.  Compared to one year ago, the patient feels his mental health is the same.    Reviewed chart for potential of high risk medication in the elderly: yes  Reviewed chart for potential of harmful drug interactions in the elderly:yes    Objective         Vitals:    08/15/19 0702   BP: 118/68   BP Location: Right arm   Patient Position: Sitting   Cuff Size: Adult   Pulse: 68   SpO2: 98%   Weight: 108 kg (238 lb)   Height: 172 cm (67.72\")       Body mass index is 36.49 kg/m².  Discussed the patient's BMI with him. The BMI is above average; BMI management plan is completed.    Physical Exam  General: Alert and oriented x 3.  No acute distress.  Normal affect.  Obese.  HEENT: Pupils equal, round, reactive to light; extraocular movements intact; sclerae nonicteric; pharynx, ear canals and TMs normal.  Neck: Without JVD, thyromegaly, bruit, or adenopathy.  Lungs: Clear to auscultation in all fields.  Heart: Regular rate and rhythm without murmur, rub, gallop, or click.  Abdomen: Soft, nontender, without hepatosplenomegaly or hernia.  Bowel sounds normal.  : Deferred.  Rectal: Deferred.  Extremities: Without clubbing, cyanosis, edema, or pulse deficit.  Neurologic: Intact without focal deficit.  Normal station and gait observed during ingress and egress from the examination room.  Skin: Without significant lesion.  Musculoskeletal: Unremarkable.    Lab Results   Component Value Date     " (H) 08/08/2019    CHLPL 131 08/08/2019    TRIG 204 (H) 08/08/2019    HGBA1C 6.30 (H) 08/08/2019        Assessment/Plan   Medicare Risks and Personalized Health Plan  CMS Preventative Services Quick Reference  Cardiovascular risk  Diabetic Lab Screening   Obesity/Overweight   Prostate Cancer Screening     The above risks/problems have been discussed with the patient.  Pertinent information has been shared with the patient in the After Visit Summary.  Follow up plans and orders are seen below in the Assessment/Plan Section.    Diagnoses and all orders for this visit:    1. Medicare annual wellness visit, subsequent (Primary)    2. Impaired fasting glucose  -     Comprehensive Metabolic Panel; Future  -     Hemoglobin A1c; Future    3. Hyperlipidemia, unspecified hyperlipidemia type  -     simvastatin (ZOCOR) 40 MG tablet; Take 1 p.o. daily for high cholesterol  Dispense: 90 tablet; Refill: 1  -     CK; Future  -     Comprehensive Metabolic Panel; Future  -     NMR LipoProfile; Future    4. Hypogonadism male, 08/14/2018--TRT initiated.    5. Hypothyroidism, unspecified type  -     TSH; Future  -     T4, Free; Future  -     T3, Free; Future    6. Gastroesophageal reflux disease without esophagitis    7. History of colon polyps, 01/14/2016--normal study. 2010--serrated adenoma ×1.      8. Multiple environmental allergies    9. Obstructive sleep apnea, 09/08/2005--AHI 18.2.  Oxygen saturation 81%.  Patient cannot tolerate CPAP.    10. Vitamin D deficiency  -     Vitamin D 25 Hydroxy; Future    11. Morbidly obese (CMS/[facility])    12. Benign prostatic hypertrophy    13. Therapeutic drug monitoring  -     CBC (No Diff); Future      Follow Up:  Return in about 6 months (around 2/15/2020) for Annual physical with lab prior.     An After Visit Summary and PPPS were given to the patient.

## 2019-08-15 NOTE — PROGRESS NOTES
08/15/2019    Patient Information  Nilson Buchanan                                                                                          [address] [facility] [address]      1950  [unfilled]  There is no work phone number on file.    Chief Complaint:     Subsequent Medicare wellness visit.  Follow-up impaired fasting glucose, hyperlipidemia, hypogonadism, hypothyroidism, esophageal reflux, history of colon polyps, multiple environmental allergies, sleep apnea, vitamin D deficiency, obesity, BPH.  No new acute complaints.    History of Present Illness:    Patient with a history of multiple medical problems as outlined in the chief complaint presents for subsequent Medicare wellness visit.  Patient also had lab work in order to monitor his chronic medical issues.  His past medical history reviewed and updated were necessary including health maintenance parameters.  This reveals he will be up-to-date or else accounted for after today's visit.    Review of Systems   Constitution: Negative.   HENT: Negative.    Eyes: Negative.    Cardiovascular: Negative.    Respiratory: Negative.    Endocrine: Negative.    Hematologic/Lymphatic: Negative.    Skin: Negative.    Musculoskeletal: Negative.    Gastrointestinal: Negative.    Genitourinary: Negative.    Neurological: Negative.    Psychiatric/Behavioral: Negative.    Allergic/Immunologic: Negative.        Active Problems:    Patient Active Problem List   Diagnosis   • Allergic rhinitis   • History of colon polyps, 01/14/2016--normal study. 2010--serrated adenoma ×1.     • Gastroesophageal reflux disease without esophagitis   • Hyperlipidemia   • Hypogonadism male, 08/14/2018--TRT initiated.   • Hypothyroidism   • Impaired fasting glucose   • Male erectile disorder   • Multiple environmental allergies   • Obstructive sleep apnea, 09/08/2005--AHI 18.2.  Oxygen saturation 81%.  Patient cannot tolerate CPAP.   • Vitamin D deficiency   • Therapeutic  drug monitoring   • Morbidly obese (CMS/[facility])   • Benign prostatic hypertrophy   • Routine physical examination   • Mood disorder (CMS/[facility])   • Chronic right shoulder pain   • Complete tear of right rotator cuff         Past Medical History:   Diagnosis Date   • Allergic rhinitis 2/21/2014    Patient reports he had allergy testing several years ago in his mid 20s. He reports allergies to molds. He was on immunotherapy for approximately one year and then this was discontinued. He was told he did not need it.   02/21/2014--Flonase 2 sprays each nostril daily initiated.   • Benign prostatic hypertrophy 2/9/2017 02/29/2017--patient reports sudden onset a few days ago with dysuria and BPH obstructive symptoms.  He had intercourse recently and after that begin to have burning with urination which has continued.  He subsequently developed intermittent obstructive symptoms consisting of urgency, weak stream, dribbling and sensation of incomplete voiding.  Digital rectal examination avoided today.  Urinalysis, urine culture, PSA ordered.  Empiric Cipro 500 mg by mouth twice a day ×30 days, Flomax 0.4 mg by mouth daily.  I will have patient follow-up after completion of antibiotics with a PSA 2 days after completion of the antibiotics providing his PSA comes back elevated or above baseline.   • Gastroesophageal reflux disease without esophagitis 4/29/2016   • History of acute prostatitis 2/9/2017    03/10/2017--patient seen in follow-up and his symptoms resolved.  PSA is 0.630.  PSA at the initiation of treatment was 1.03.  Urinalysis and urine culture returned negative.  02/29/2017--patient reports sudden onset a few days ago with dysuria and BPH obstructive symptoms.  He had intercourse recently and after that begin to have burning with urination which has continued.  He subsequently developed intermittent obstructive symptoms consisting of urgency, weak stream, dribbling and sensation of incomplete voiding.  Digital  rectal examination avoided today.  Urinalysis, urine culture, PSA ordered.  Empiric Cipro 500 mg by mouth twice a day ×30 days, Flomax 0.4 mg by mouth daily.  I will have patient follow-up after completion of antibiotics with a PSA 2 days after completion of the antibiotics providing his PSA comes back elevated or above baseline.   • History of BPPV (benign paroxysmal positional vertigo) 6/20/2016 12/19/2016--patient reports symptoms have resolved.  06/20/2016--patient presents with a new complaint of a several week history of intermittent episodes of dizziness described as a spinning sensation.  It is always associated with abrupt standing for lying down to go to bed.  Only lasts for a very short period of time and there is no other associated symptoms.  He does not feel the symptoms are bad enough at the present time to warrant vestibular therapy.   • History of colon polyps, 01/14/2016--normal study. 2010--serrated adenoma ×1.   5/10/2010    01/14/2016--normal colonoscopy.  Recommended repeat study in 10 years.  05/10/2010--colonoscopy revealed a 3 mm cecal polyp that was removed completely. The remainder of the colonoscopy was normal. Recommend repeat colonoscopy in 5 years. If the polyp is adenomatous, would recommend all first-degree relatives over age 40 be in screening as well. Pathology returned serrated adenoma.   • History of pneumonia 12/06/2014 12/06/2014--patient seen in follow up and reports he is definitely better but he is still having a cough that seems to come and go. This been no fever. He still has some shortness of breath but this is better. Lung examination reveals continued bibasilar crackles but they are somewhat improved over previous. I reviewed the chest x-ray results and even though the chest x-ray shows no definite pneum   • History of Thoracic compression fracture 03/28/2004 03/28/2004--patient fell and suffered a moderate T12 compression fracture. No subluxation or  retropulsion of fracture fragments is seen. Approximately 40% loss of anterior vertebral body height.   • Hyperlipidemia 4/29/2016   • Hypogonadism male, 08/14/2018--TRT initiated. 6/10/2013    02/21/2014--treatment for hypothyroidism discontinued. Patient did not feel any better with the testosterone. It also is extremely expensive.  06/10/2013--treatment for hypogonadism begun.      • Hypothyroidism 4/29/2016   • Impaired fasting glucose 4/29/2016   • Male erectile disorder 11/23/2015 11/23/2015--patient presents with complaints of erectile dysfunction. He does have borderline hypogonadism but previous treatment was not particularly effective. I gave him samples of Stendra, Viagra, Staxyn.   • Mood disorder (CMS/[facility]) 1/30/2019   • Multiple environmental allergies 4/29/2016    Patient reports he had allergy testing several years ago in his mid 20s. He reports allergies to molds. He was on immunotherapy for approximately one year and then this was discontinued. He was told he did not need it.   • Obstructive sleep apnea, 09/08/2005--AHI 18.2.  Oxygen saturation 81%.  Patient cannot tolerate CPAP. 9/8/2005 09/08/2005--sleep study revealed an apnea/hypopnea index for total sleep time to be moderately abnormal at 18.2. Lowest oxygen saturation 81%. CPAP was tried but patient is intolerant.   • Vitamin D deficiency 4/29/2016         Past Surgical History:   Procedure Laterality Date   • ANKLE SURGERY Left 2004 2004--repair of left ankle fracture with hardware placement.   • CARPAL TUNNEL RELEASE Right 1989 1989--right carpal tunnel release.   • COLONOSCOPY  05/10/2010    05/10/2010--colonoscopy revealed a 3 mm cecal polyp that was removed completely. The remainder of the colonoscopy was normal. Recommend repeat colonoscopy in 5 years. If the polyp is adenomatous, would recommend all first-degree relatives over age 40 be in screening as well. Pathology returned serrated adenoma.   • COLONOSCOPY  01/14/2016     2016--normal colonoscopy.  Recommended repeat study in 10 years.   • LAPAROSCOPIC CHOLECYSTECTOMY  2012--laparoscopic cholecystectomy. Pathology returned cholesterolosis.   • TONSILLECTOMY  Childhood    Childhood tonsillectomy         Allergies   Allergen Reactions   • Cortisone Other (See Comments)     Locks up patient jjoint           Current Outpatient Medications:   •  aspirin 81 MG tablet, Take 1 tablet by mouth Daily., Disp: , Rfl:   •  Cholecalciferol (VITAMIN D3) 5000 UNITS capsule capsule, Take 1 capsule by mouth daily., Disp: , Rfl:   •  ezetimibe (ZETIA) 10 MG tablet, TAKE 1 TABLET DAILY, Disp: 90 tablet, Rfl: 1  •  fluticasone (FLONASE) 50 MCG/ACT nasal spray, PLACE TWO SPRAYS IN EACH NOSTRIL ONCE DAILY, Disp: 16 g, Rfl: 8  •  levothyroxine (SYNTHROID) 150 MCG tablet, One by mouth daily for low thyroid, Disp: 90 tablet, Rfl: 3  •  simvastatin (ZOCOR) 40 MG tablet, TAKE 1 TABLET EVERY NIGHT (NEED LABS AND FOLLOW UP AS SOON AS POSSIBLE), Disp: 90 tablet, Rfl: 1  •  Testosterone (ANDROGEL PUMP) 20.25 MG/ACT (1.62%) gel, Apply a total of 2 pumps daily as directed, Disp: 75 g, Rfl: 5  •  OXcarbazepine (TRILEPTAL) 300 MG tablet, Take 1 by mouth daily, Disp: 30 tablet, Rfl: 6      Family History   Problem Relation Age of Onset   • Cancer Father         Liver Cancer. Father  from liver cancer.   • Heart attack Brother         Acute Myocardial Infarction.  Brother had a myocardial infarction at age 68.         Social History     Socioeconomic History   • Marital status:      Spouse name: Not on file   • Number of children: 2   • Years of education: Not on file   • Highest education level: 12th grade   Occupational History   • Occupation: Retired - [employer]   Social Needs   • Financial resource strain: Not hard at all   • Food insecurity:     Worry: Never true     Inability: Never true   • Transportation needs:     Medical: No     Non-medical: No   Tobacco Use   • Smoking status:  "Former Smoker   • Smokeless tobacco: Never Used   • Tobacco comment: Stopped smoking at age 35.   Substance and Sexual Activity   • Alcohol use: Yes     Frequency: Monthly or less     Drinks per session: 1 or 2     Comment: Moderately   • Drug use: No   • Sexual activity: Yes     Partners: Female   Lifestyle   • Physical activity:     Days per week: 7 days     Minutes per session: 120 min   • Stress: Not at all   Relationships   • Social connections:     Talks on phone: More than three times a week     Gets together: Three times a week     Attends Muslim service: More than 4 times per year     Active member of club or organization: No     Attends meetings of clubs or organizations: Never     Relationship status:          Vitals:    08/15/19 0702   BP: 118/68   BP Location: Right arm   Patient Position: Sitting   Cuff Size: Adult   Pulse: 68   SpO2: 98%   Weight: 108 kg (238 lb)   Height: 172 cm (67.72\")          Physical Exam:    General: Alert and oriented x 3.  No acute distress.  Normal affect.  Obese.  HEENT: Pupils equal, round, reactive to light; extraocular movements intact; sclerae nonicteric; pharynx, ear canals and TMs normal.  Neck: Without JVD, thyromegaly, bruit, or adenopathy.  Lungs: Clear to auscultation in all fields.  Heart: Regular rate and rhythm without murmur, rub, gallop, or click.  Abdomen: Soft, nontender, without hepatosplenomegaly or hernia.  Bowel sounds normal.  : Deferred.  Rectal: Deferred.  Extremities: Without clubbing, cyanosis, edema, or pulse deficit.  Neurologic: Intact without focal deficit.  Normal station and gait observed during ingress and egress from the examination room.  Skin: Without significant lesion.  Musculoskeletal: Unremarkable.    Lab/other results:    NMR reveals a total cholesterol 131.  Triglycerides slightly elevated 204.  LDL particle number slightly elevated at 1004.  Small LDL particle number mildly elevated at 782.  HDL particle number normal " at 32.1.  CMP normal except glucose 124, creatinine slightly elevated at 1.34.  CBC is normal.  Hemoglobin A1c 6.3.  Total testosterone low 111.  Free and weakly bound testosterone low at 22.3.  Thyroid function tests are normal.  PSA normal.  Vitamin D normal.  CPK normal.    Assessment/Plan:     Diagnosis Plan   1. Medicare annual wellness visit, subsequent     2. Impaired fasting glucose     3. Hyperlipidemia, unspecified hyperlipidemia type     4. Hypogonadism male, 08/14/2018--TRT initiated.     5. Hypothyroidism, unspecified type     6. Gastroesophageal reflux disease without esophagitis     7. History of colon polyps, 01/14/2016--normal study. 2010--serrated adenoma ×1.       8. Multiple environmental allergies     9. Obstructive sleep apnea, 09/08/2005--AHI 18.2.  Oxygen saturation 81%.  Patient cannot tolerate CPAP.     10. Vitamin D deficiency     11. Morbidly obese (CMS/[facility])     12. Benign prostatic hypertrophy       The subsequent Medicare wellness visit is documented on separate note.    Patient has impaired fasting glucose that does not require medication.  He does not have diabetes.  Hyperlipidemia is under good control.  His testosterone levels are low and thus because he was out of his testosterone supplementation due to insurance reasons.  He is now back on it.  His thyroid is therapeutic.  Reflux has not been an issue.  Patient has a history of colon polyps and is up-to-date on his colonoscopy.  His allergies currently not a big issue.  Patient has sleep apnea but cannot tolerate CPAP.  His sleep apnea was mild.  Vitamin D is therapeutic.  BPH is asymptomatic.    Plan is as follows: No change in current medical regimen.  Patient encouraged to decrease carbohydrate intake, exercise and lose weight.  I will have him follow-up in 6 months with lab prior and I will also be his [facility] Medicare replacement annual.      Procedures

## 2019-09-23 DIAGNOSIS — E03.9 HYPOTHYROIDISM, UNSPECIFIED TYPE: Chronic | ICD-10-CM

## 2019-09-23 RX ORDER — LEVOTHYROXINE SODIUM 0.15 MG/1
TABLET ORAL
Qty: 90 TABLET | Refills: 3 | Status: SHIPPED | OUTPATIENT
Start: 2019-09-23 | End: 2020-01-08 | Stop reason: SDUPTHER

## 2019-11-09 DIAGNOSIS — E78.5 HYPERLIPIDEMIA, UNSPECIFIED HYPERLIPIDEMIA TYPE: Chronic | ICD-10-CM

## 2019-11-11 RX ORDER — EZETIMIBE 10 MG/1
TABLET ORAL
Qty: 90 TABLET | Refills: 4 | Status: SHIPPED | OUTPATIENT
Start: 2019-11-11 | End: 2020-08-19 | Stop reason: SDUPTHER

## 2019-11-11 RX ORDER — SIMVASTATIN 40 MG
TABLET ORAL
Qty: 90 TABLET | Refills: 4 | Status: SHIPPED | OUTPATIENT
Start: 2019-11-11 | End: 2020-08-19 | Stop reason: SDUPTHER

## 2019-11-27 ENCOUNTER — LAB (OUTPATIENT)
Dept: LAB | Facility: HOSPITAL | Age: 69
End: 2019-11-27

## 2019-11-27 ENCOUNTER — TRANSCRIBE ORDERS (OUTPATIENT)
Dept: ADMINISTRATIVE | Facility: HOSPITAL | Age: 69
End: 2019-11-27

## 2019-11-27 DIAGNOSIS — I10 BENIGN HYPERTENSION: Primary | ICD-10-CM

## 2019-11-27 DIAGNOSIS — I10 BENIGN HYPERTENSION: ICD-10-CM

## 2019-11-27 LAB
ANION GAP SERPL CALCULATED.3IONS-SCNC: 10.3 MMOL/L (ref 5–15)
BUN BLD-MCNC: 14 MG/DL (ref 8–23)
BUN/CREAT SERPL: 10 (ref 7–25)
CALCIUM SPEC-SCNC: 9.4 MG/DL (ref 8.6–10.5)
CHLORIDE SERPL-SCNC: 104 MMOL/L (ref 98–107)
CO2 SERPL-SCNC: 24.7 MMOL/L (ref 22–29)
CREAT BLD-MCNC: 1.4 MG/DL (ref 0.76–1.27)
GFR SERPL CREATININE-BSD FRML MDRD: 50 ML/MIN/1.73
GLUCOSE BLD-MCNC: 106 MG/DL (ref 65–99)
POTASSIUM BLD-SCNC: 3.9 MMOL/L (ref 3.5–5.2)
SODIUM BLD-SCNC: 139 MMOL/L (ref 136–145)

## 2019-11-27 PROCEDURE — 80048 BASIC METABOLIC PNL TOTAL CA: CPT

## 2019-11-27 PROCEDURE — 36415 COLL VENOUS BLD VENIPUNCTURE: CPT

## 2020-01-01 ENCOUNTER — TELEPHONE (OUTPATIENT)
Dept: INTERNAL MEDICINE | Facility: CLINIC | Age: 70
End: 2020-01-01

## 2020-01-01 ENCOUNTER — HOSPITAL ENCOUNTER (OUTPATIENT)
Dept: CT IMAGING | Facility: HOSPITAL | Age: 70
Discharge: HOME | End: 2020-11-09

## 2020-01-01 ENCOUNTER — LAB (OUTPATIENT)
Dept: LAB | Facility: HOSPITAL | Age: 70
End: 2020-01-01

## 2020-01-01 ENCOUNTER — OFFICE VISIT (OUTPATIENT)
Dept: INTERNAL MEDICINE | Facility: CLINIC | Age: 70
End: 2020-01-01

## 2020-01-01 VITALS
HEART RATE: 71 BPM | DIASTOLIC BLOOD PRESSURE: 70 MMHG | WEIGHT: 230 LBS | BODY MASS INDEX: 34.86 KG/M2 | OXYGEN SATURATION: 98 % | SYSTOLIC BLOOD PRESSURE: 124 MMHG | HEIGHT: 68 IN

## 2020-01-01 DIAGNOSIS — R39.15 URINARY URGENCY: ICD-10-CM

## 2020-01-01 DIAGNOSIS — N40.1 BENIGN NON-NODULAR PROSTATIC HYPERPLASIA WITH LOWER URINARY TRACT SYMPTOMS: ICD-10-CM

## 2020-01-01 DIAGNOSIS — Z91.09 MULTIPLE ENVIRONMENTAL ALLERGIES: Chronic | ICD-10-CM

## 2020-01-01 DIAGNOSIS — Z23 NEED FOR INFLUENZA VACCINATION: ICD-10-CM

## 2020-01-01 DIAGNOSIS — J30.1 CHRONIC SEASONAL ALLERGIC RHINITIS DUE TO POLLEN: Primary | Chronic | ICD-10-CM

## 2020-01-01 DIAGNOSIS — R10.9 RIGHT FLANK PAIN: ICD-10-CM

## 2020-01-01 DIAGNOSIS — R35.0 URINARY FREQUENCY: ICD-10-CM

## 2020-01-01 DIAGNOSIS — N40.1 BENIGN NON-NODULAR PROSTATIC HYPERPLASIA WITH LOWER URINARY TRACT SYMPTOMS: Chronic | ICD-10-CM

## 2020-01-01 DIAGNOSIS — R10.9 RIGHT FLANK PAIN: Primary | ICD-10-CM

## 2020-01-01 DIAGNOSIS — E78.2 MIXED HYPERLIPIDEMIA: Chronic | ICD-10-CM

## 2020-01-01 LAB
BILIRUB BLD-MCNC: ABNORMAL MG/DL
CLARITY, POC: CLEAR
COLOR UR: YELLOW
GLUCOSE UR STRIP-MCNC: NEGATIVE MG/DL
KETONES UR QL: NEGATIVE
LEUKOCYTE EST, POC: NEGATIVE
NITRITE UR-MCNC: NEGATIVE MG/ML
PH UR: 6 [PH] (ref 5–8)
PROT UR STRIP-MCNC: NEGATIVE MG/DL
PSA SERPL-MCNC: 1.08 NG/ML (ref 0–4)
RBC # UR STRIP: NEGATIVE /UL
SP GR UR: 1.01 (ref 1–1.03)
UROBILINOGEN UR QL: NORMAL

## 2020-01-01 PROCEDURE — 84153 ASSAY OF PSA TOTAL: CPT

## 2020-01-01 PROCEDURE — 81003 URINALYSIS AUTO W/O SCOPE: CPT | Performed by: INTERNAL MEDICINE

## 2020-01-01 PROCEDURE — 90694 VACC AIIV4 NO PRSRV 0.5ML IM: CPT | Performed by: INTERNAL MEDICINE

## 2020-01-01 PROCEDURE — 74176 CT ABD & PELVIS W/O CONTRAST: CPT

## 2020-01-01 PROCEDURE — 99214 OFFICE O/P EST MOD 30 MIN: CPT | Performed by: INTERNAL MEDICINE

## 2020-01-01 PROCEDURE — 36415 COLL VENOUS BLD VENIPUNCTURE: CPT

## 2020-01-01 PROCEDURE — G0008 ADMIN INFLUENZA VIRUS VAC: HCPCS | Performed by: INTERNAL MEDICINE

## 2020-01-01 RX ORDER — TAMSULOSIN HYDROCHLORIDE 0.4 MG/1
CAPSULE ORAL
Qty: 30 CAPSULE | Refills: 2 | Status: SHIPPED | OUTPATIENT
Start: 2020-01-01 | End: 2021-01-01 | Stop reason: SDUPTHER

## 2020-01-01 RX ORDER — CYCLOBENZAPRINE HCL 10 MG
10 TABLET ORAL 3 TIMES DAILY PRN
Qty: 30 TABLET | Refills: 0 | Status: SHIPPED | OUTPATIENT
Start: 2020-01-01 | End: 2021-01-01

## 2020-01-01 RX ORDER — OFLOXACIN 3 MG/ML
SOLUTION/ DROPS OPHTHALMIC
COMMUNITY
Start: 2020-01-01 | End: 2020-01-01

## 2020-01-01 RX ORDER — SIMVASTATIN 40 MG
TABLET ORAL
Qty: 90 TABLET | Refills: 3
Start: 2020-01-01 | End: 2020-01-01

## 2020-01-01 RX ORDER — EZETIMIBE 10 MG/1
TABLET ORAL
Qty: 90 TABLET | Refills: 3
Start: 2020-01-01 | End: 2020-01-01

## 2020-01-01 RX ORDER — SIMVASTATIN 40 MG
TABLET ORAL
Qty: 90 TABLET | Refills: 3 | Status: SHIPPED | OUTPATIENT
Start: 2020-01-01 | End: 2021-01-01 | Stop reason: SDUPTHER

## 2020-01-01 RX ORDER — NAPROXEN 500 MG/1
TABLET ORAL
Qty: 60 TABLET | Refills: 1 | Status: SHIPPED | OUTPATIENT
Start: 2020-01-01 | End: 2021-01-01

## 2020-01-01 RX ORDER — EZETIMIBE 10 MG/1
TABLET ORAL
Qty: 90 TABLET | Refills: 3 | Status: SHIPPED | OUTPATIENT
Start: 2020-01-01 | End: 2021-01-01 | Stop reason: SDUPTHER

## 2020-01-01 RX ORDER — PREDNISOLONE ACETATE 10 MG/ML
SUSPENSION/ DROPS OPHTHALMIC
COMMUNITY
Start: 2020-01-01 | End: 2021-01-01

## 2020-01-08 DIAGNOSIS — E03.9 HYPOTHYROIDISM, UNSPECIFIED TYPE: Chronic | ICD-10-CM

## 2020-01-08 RX ORDER — LEVOTHYROXINE SODIUM 0.15 MG/1
TABLET ORAL
Qty: 90 TABLET | Refills: 3 | Status: SHIPPED | OUTPATIENT
Start: 2020-01-08 | End: 2021-01-01

## 2020-02-10 DIAGNOSIS — Z51.81 THERAPEUTIC DRUG MONITORING: ICD-10-CM

## 2020-02-10 DIAGNOSIS — E78.5 HYPERLIPIDEMIA, UNSPECIFIED HYPERLIPIDEMIA TYPE: Chronic | ICD-10-CM

## 2020-02-10 DIAGNOSIS — E03.9 HYPOTHYROIDISM, UNSPECIFIED TYPE: Chronic | ICD-10-CM

## 2020-02-10 DIAGNOSIS — R73.01 IMPAIRED FASTING GLUCOSE: Chronic | ICD-10-CM

## 2020-02-10 DIAGNOSIS — E55.9 VITAMIN D DEFICIENCY: Chronic | ICD-10-CM

## 2020-02-11 RX ORDER — FLUTICASONE PROPIONATE 50 MCG
2 SPRAY, SUSPENSION (ML) NASAL DAILY
Qty: 16 G | Refills: 8 | Status: SHIPPED | OUTPATIENT
Start: 2020-02-11

## 2020-02-12 LAB
25(OH)D3+25(OH)D2 SERPL-MCNC: 25.6 NG/ML (ref 30–100)
ALBUMIN SERPL-MCNC: 4.4 G/DL (ref 3.5–5.2)
ALBUMIN/GLOB SERPL: 1.8 G/DL
ALP SERPL-CCNC: 59 U/L (ref 39–117)
ALT SERPL-CCNC: 21 U/L (ref 1–41)
AST SERPL-CCNC: 16 U/L (ref 1–40)
BILIRUB SERPL-MCNC: 0.7 MG/DL (ref 0.2–1.2)
BUN SERPL-MCNC: 15 MG/DL (ref 8–23)
BUN/CREAT SERPL: 11.5 (ref 7–25)
CALCIUM SERPL-MCNC: 9.1 MG/DL (ref 8.6–10.5)
CHLORIDE SERPL-SCNC: 102 MMOL/L (ref 98–107)
CHOLEST SERPL-MCNC: 122 MG/DL (ref 100–199)
CK SERPL-CCNC: 108 U/L (ref 20–200)
CO2 SERPL-SCNC: 26.6 MMOL/L (ref 22–29)
CREAT SERPL-MCNC: 1.31 MG/DL (ref 0.76–1.27)
ERYTHROCYTE [DISTWIDTH] IN BLOOD BY AUTOMATED COUNT: 12.7 % (ref 12.3–15.4)
GLOBULIN SER CALC-MCNC: 2.4 GM/DL
GLUCOSE SERPL-MCNC: 130 MG/DL (ref 65–99)
HBA1C MFR BLD: 6.5 % (ref 4.8–5.6)
HCT VFR BLD AUTO: 42.5 % (ref 37.5–51)
HDL SERPL-SCNC: 31.1 UMOL/L
HDLC SERPL-MCNC: 37 MG/DL
HGB BLD-MCNC: 14.3 G/DL (ref 13–17.7)
LDL SERPL QN: 20.1 NM
LDL SERPL-SCNC: 787 NMOL/L
LDL SMALL SERPL-SCNC: 549 NMOL/L
LDLC SERPL CALC-MCNC: 56 MG/DL (ref 0–99)
MCH RBC QN AUTO: 31 PG (ref 26.6–33)
MCHC RBC AUTO-ENTMCNC: 33.6 G/DL (ref 31.5–35.7)
MCV RBC AUTO: 92 FL (ref 79–97)
PLATELET # BLD AUTO: 237 10*3/MM3 (ref 140–450)
POTASSIUM SERPL-SCNC: 4.6 MMOL/L (ref 3.5–5.2)
PROT SERPL-MCNC: 6.8 G/DL (ref 6–8.5)
RBC # BLD AUTO: 4.62 10*6/MM3 (ref 4.14–5.8)
SODIUM SERPL-SCNC: 141 MMOL/L (ref 136–145)
T3FREE SERPL-MCNC: 3 PG/ML (ref 2–4.4)
T4 FREE SERPL-MCNC: 1.46 NG/DL (ref 0.93–1.7)
TRIGL SERPL-MCNC: 146 MG/DL (ref 0–149)
TSH SERPL DL<=0.005 MIU/L-ACNC: 2.17 UIU/ML (ref 0.27–4.2)
WBC # BLD AUTO: 6.03 10*3/MM3 (ref 3.4–10.8)

## 2020-02-18 ENCOUNTER — OFFICE VISIT (OUTPATIENT)
Dept: INTERNAL MEDICINE | Facility: CLINIC | Age: 70
End: 2020-02-18

## 2020-02-18 VITALS
BODY MASS INDEX: 35.92 KG/M2 | HEART RATE: 75 BPM | HEIGHT: 68 IN | OXYGEN SATURATION: 97 % | SYSTOLIC BLOOD PRESSURE: 130 MMHG | DIASTOLIC BLOOD PRESSURE: 60 MMHG | WEIGHT: 237 LBS

## 2020-02-18 DIAGNOSIS — E03.9 PRIMARY HYPOTHYROIDISM: Chronic | ICD-10-CM

## 2020-02-18 DIAGNOSIS — Z86.010 HISTORY OF COLON POLYPS: Chronic | ICD-10-CM

## 2020-02-18 DIAGNOSIS — N40.1 BENIGN NON-NODULAR PROSTATIC HYPERPLASIA WITH LOWER URINARY TRACT SYMPTOMS: Chronic | ICD-10-CM

## 2020-02-18 DIAGNOSIS — E29.1 HYPOGONADISM MALE: Chronic | ICD-10-CM

## 2020-02-18 DIAGNOSIS — M75.121 NONTRAUMATIC COMPLETE TEAR OF RIGHT ROTATOR CUFF: ICD-10-CM

## 2020-02-18 DIAGNOSIS — G89.29 CHRONIC RIGHT SHOULDER PAIN: ICD-10-CM

## 2020-02-18 DIAGNOSIS — Z91.09 MULTIPLE ENVIRONMENTAL ALLERGIES: Chronic | ICD-10-CM

## 2020-02-18 DIAGNOSIS — Z51.81 THERAPEUTIC DRUG MONITORING: ICD-10-CM

## 2020-02-18 DIAGNOSIS — R73.01 IMPAIRED FASTING GLUCOSE: Primary | Chronic | ICD-10-CM

## 2020-02-18 DIAGNOSIS — E66.01 MORBIDLY OBESE (HCC): Chronic | ICD-10-CM

## 2020-02-18 DIAGNOSIS — K21.9 GASTROESOPHAGEAL REFLUX DISEASE WITHOUT ESOPHAGITIS: Chronic | ICD-10-CM

## 2020-02-18 DIAGNOSIS — F39 MOOD DISORDER (HCC): Chronic | ICD-10-CM

## 2020-02-18 DIAGNOSIS — G47.33 OBSTRUCTIVE SLEEP APNEA: Chronic | ICD-10-CM

## 2020-02-18 DIAGNOSIS — E78.2 MIXED HYPERLIPIDEMIA: Chronic | ICD-10-CM

## 2020-02-18 DIAGNOSIS — M25.511 CHRONIC RIGHT SHOULDER PAIN: ICD-10-CM

## 2020-02-18 DIAGNOSIS — E55.9 VITAMIN D DEFICIENCY: Chronic | ICD-10-CM

## 2020-02-18 PROCEDURE — 99214 OFFICE O/P EST MOD 30 MIN: CPT | Performed by: INTERNAL MEDICINE

## 2020-02-18 NOTE — PROGRESS NOTES
02/18/2020    Patient Information  Nilson Buchanan                                                                                          [address] [facility] [address]      1950  [unfilled]  There is no work phone number on file.    Chief Complaint:     Follow-up recent lab work in order to monitor several chronic medical issues listed in history of present illness.  No new acute complaints.    History of Present Illness:    Patient with a history of impaired fasting glucose, hyperlipidemia, primary hypothyroidism, history of colon polyps, esophageal reflux, hypogonadism, multiple environmental allergies, obstructive sleep apnea, vitamin D deficiency, BPH, mood disorder, chronic right shoulder pain and history of right rotator cuff tear.  He presents today for follow-up with lab prior in order to monitor his chronic medical issues.  His past medical history reviewed and updated were necessary including health maintenance parameters.  This reveals he is up-to-date or else accounted for.    Review of Systems   Constitution: Negative.   HENT: Negative.    Eyes: Negative.    Cardiovascular: Negative.    Respiratory: Negative.    Endocrine: Negative.    Hematologic/Lymphatic: Negative.    Skin: Negative.    Musculoskeletal: Negative.    Gastrointestinal: Negative.    Genitourinary: Negative.    Neurological: Negative.    Psychiatric/Behavioral: Negative.    Allergic/Immunologic: Negative.        Active Problems:    Patient Active Problem List   Diagnosis   • Allergic rhinitis   • History of colon polyps, 01/14/2016--normal study. 2010--serrated adenoma ×1.     • Gastroesophageal reflux disease without esophagitis   • Hyperlipidemia   • Hypogonadism male, 08/14/2018--TRT initiated.   • Primary hypothyroidism   • Impaired fasting glucose   • Male erectile disorder   • Multiple environmental allergies   • Obstructive sleep apnea, 09/08/2005--AHI 18.2.  Oxygen saturation 81%.  Patient cannot  tolerate CPAP.   • Vitamin D deficiency   • Therapeutic drug monitoring   • Morbidly obese (CMS/HCC)   • Benign prostatic hypertrophy   • Routine physical examination   • Mood disorder (CMS/HCC)   • Chronic right shoulder pain   • Complete tear of right rotator cuff         Past Medical History:   Diagnosis Date   • Allergic rhinitis 2/21/2014    Patient reports he had allergy testing several years ago in his mid 20s. He reports allergies to molds. He was on immunotherapy for approximately one year and then this was discontinued. He was told he did not need it.   02/21/2014--Flonase 2 sprays each nostril daily initiated.   • Benign prostatic hypertrophy 2/9/2017 02/29/2017--patient reports sudden onset a few days ago with dysuria and BPH obstructive symptoms.  He had intercourse recently and after that begin to have burning with urination which has continued.  He subsequently developed intermittent obstructive symptoms consisting of urgency, weak stream, dribbling and sensation of incomplete voiding.  Digital rectal examination avoided today.  Urinalysis, urine culture, PSA ordered.  Empiric Cipro 500 mg by mouth twice a day ×30 days, Flomax 0.4 mg by mouth daily.  I will have patient follow-up after completion of antibiotics with a PSA 2 days after completion of the antibiotics providing his PSA comes back elevated or above baseline.   • Gastroesophageal reflux disease without esophagitis 4/29/2016   • History of acute prostatitis 2/9/2017    03/10/2017--patient seen in follow-up and his symptoms resolved.  PSA is 0.630.  PSA at the initiation of treatment was 1.03.  Urinalysis and urine culture returned negative.  02/29/2017--patient reports sudden onset a few days ago with dysuria and BPH obstructive symptoms.  He had intercourse recently and after that begin to have burning with urination which has continued.  He subsequently developed intermittent obstructive symptoms consisting of urgency, weak stream,  dribbling and sensation of incomplete voiding.  Digital rectal examination avoided today.  Urinalysis, urine culture, PSA ordered.  Empiric Cipro 500 mg by mouth twice a day ×30 days, Flomax 0.4 mg by mouth daily.  I will have patient follow-up after completion of antibiotics with a PSA 2 days after completion of the antibiotics providing his PSA comes back elevated or above baseline.   • History of BPPV (benign paroxysmal positional vertigo) 6/20/2016 12/19/2016--patient reports symptoms have resolved.  06/20/2016--patient presents with a new complaint of a several week history of intermittent episodes of dizziness described as a spinning sensation.  It is always associated with abrupt standing for lying down to go to bed.  Only lasts for a very short period of time and there is no other associated symptoms.  He does not feel the symptoms are bad enough at the present time to warrant vestibular therapy.   • History of colon polyps, 01/14/2016--normal study. 2010--serrated adenoma ×1.   5/10/2010    01/14/2016--normal colonoscopy.  Recommended repeat study in 10 years.  05/10/2010--colonoscopy revealed a 3 mm cecal polyp that was removed completely. The remainder of the colonoscopy was normal. Recommend repeat colonoscopy in 5 years. If the polyp is adenomatous, would recommend all first-degree relatives over age 40 be in screening as well. Pathology returned serrated adenoma.   • History of pneumonia 12/06/2014 12/06/2014--patient seen in follow up and reports he is definitely better but he is still having a cough that seems to come and go. This been no fever. He still has some shortness of breath but this is better. Lung examination reveals continued bibasilar crackles but they are somewhat improved over previous. I reviewed the chest x-ray results and even though the chest x-ray shows no definite pneum   • History of Thoracic compression fracture 03/28/2004 03/28/2004--patient fell and suffered a  moderate T12 compression fracture. No subluxation or retropulsion of fracture fragments is seen. Approximately 40% loss of anterior vertebral body height.   • Hyperlipidemia 4/29/2016   • Hypogonadism male, 08/14/2018--TRT initiated. 6/10/2013    02/21/2014--treatment for hypothyroidism discontinued. Patient did not feel any better with the testosterone. It also is extremely expensive.  06/10/2013--treatment for hypogonadism begun.      • Hypothyroidism 4/29/2016   • Impaired fasting glucose 4/29/2016   • Male erectile disorder 11/23/2015 11/23/2015--patient presents with complaints of erectile dysfunction. He does have borderline hypogonadism but previous treatment was not particularly effective. I gave him samples of Stendra, Viagra, Staxyn.   • Mood disorder (CMS/[facility]) 1/30/2019   • Multiple environmental allergies 4/29/2016    Patient reports he had allergy testing several years ago in his mid 20s. He reports allergies to molds. He was on immunotherapy for approximately one year and then this was discontinued. He was told he did not need it.   • Obstructive sleep apnea, 09/08/2005--AHI 18.2.  Oxygen saturation 81%.  Patient cannot tolerate CPAP. 9/8/2005 09/08/2005--sleep study revealed an apnea/hypopnea index for total sleep time to be moderately abnormal at 18.2. Lowest oxygen saturation 81%. CPAP was tried but patient is intolerant.   • Primary hypothyroidism 4/29/2016   • Vitamin D deficiency 4/29/2016         Past Surgical History:   Procedure Laterality Date   • ANKLE SURGERY Left 2004 2004--repair of left ankle fracture with hardware placement.   • CARPAL TUNNEL RELEASE Right 1989 1989--right carpal tunnel release.   • CATARACT EXTRACTION, BILATERAL  08/2019 August 2019--bilateral cataract extirpation with intraocular lens implantation.   • COLONOSCOPY  05/10/2010    05/10/2010--colonoscopy revealed a 3 mm cecal polyp that was removed completely. The remainder of the colonoscopy was normal.  Recommend repeat colonoscopy in 5 years. If the polyp is adenomatous, would recommend all first-degree relatives over age 40 be in screening as well. Pathology returned serrated adenoma.   • COLONOSCOPY  2016--normal colonoscopy.  Recommended repeat study in 10 years.   • LAPAROSCOPIC CHOLECYSTECTOMY  2012--laparoscopic cholecystectomy. Pathology returned cholesterolosis.   • TONSILLECTOMY  Childhood    Childhood tonsillectomy         Allergies   Allergen Reactions   • Cortisone Other (See Comments)     Locks up patient jjoint           Current Outpatient Medications:   •  aspirin 81 MG tablet, Take 1 tablet by mouth Daily., Disp: , Rfl:   •  Cholecalciferol (VITAMIN D3) 5000 UNITS capsule capsule, Take 1 capsule by mouth daily., Disp: , Rfl:   •  ezetimibe (ZETIA) 10 MG tablet, TAKE 1 TABLET DAILY, Disp: 90 tablet, Rfl: 4  •  fluticasone (FLONASE) 50 MCG/ACT nasal spray, 2 sprays by Each Nare route Daily., Disp: 16 g, Rfl: 8  •  levothyroxine (SYNTHROID) 150 MCG tablet, One by mouth daily for low thyroid, Disp: 90 tablet, Rfl: 3  •  OXcarbazepine (TRILEPTAL) 300 MG tablet, Take 1 by mouth daily, Disp: 30 tablet, Rfl: 6  •  simvastatin (ZOCOR) 40 MG tablet, TAKE 1 TABLET EVERY NIGHT (NEED LABS AND FOLLOW UP AS SOON AS POSSIBLE), Disp: 90 tablet, Rfl: 4  •  Testosterone (ANDROGEL PUMP) 20.25 MG/ACT (1.62%) gel, Apply a total of 2 pumps daily as directed, Disp: 75 g, Rfl: 5      Family History   Problem Relation Age of Onset   • Cancer Father         Liver Cancer. Father  from liver cancer.   • Heart attack Brother         Acute Myocardial Infarction.  Brother had a myocardial infarction at age 68.         Social History     Socioeconomic History   • Marital status:      Spouse name: Not on file   • Number of children: 2   • Years of education: Not on file   • Highest education level: 12th grade   Occupational History   • Occupation: Retired - [employer]   Social Needs   •  "Financial resource strain: Not hard at all   • Food insecurity:     Worry: Never true     Inability: Never true   • Transportation needs:     Medical: No     Non-medical: No   Tobacco Use   • Smoking status: Former Smoker   • Smokeless tobacco: Never Used   • Tobacco comment: Stopped smoking at age 35.   Substance and Sexual Activity   • Alcohol use: Yes     Frequency: Monthly or less     Drinks per session: 1 or 2     Comment: Moderately   • Drug use: No   • Sexual activity: Yes     Partners: Female   Lifestyle   • Physical activity:     Days per week: 7 days     Minutes per session: 120 min   • Stress: Not at all   Relationships   • Social connections:     Talks on phone: More than three times a week     Gets together: Three times a week     Attends Worship service: More than 4 times per year     Active member of club or organization: No     Attends meetings of clubs or organizations: Never     Relationship status:          Vitals:    02/18/20 0712   BP: 130/60   BP Location: Left arm   Pulse: 75   SpO2: 97%   Weight: 108 kg (237 lb)   Height: 172 cm (67.72\")        Body mass index is 36.34 kg/m².      Physical Exam:    General: Alert and oriented x 3.  No acute distress.  Normal affect.  HEENT: Pupils equal, round, reactive to light; extraocular movements intact; sclerae nonicteric; pharynx, ear canals and TMs normal.  Neck: Without JVD, thyromegaly, bruit, or adenopathy.  Lungs: Clear to auscultation in all fields.  Heart: Regular rate and rhythm without murmur, rub, gallop, or click.  Abdomen: Soft, nontender, without hepatosplenomegaly or hernia.  Bowel sounds normal.  : Deferred.  Rectal: Deferred.  Extremities: Without clubbing, cyanosis, edema, or pulse deficit.  Neurologic: Intact without focal deficit.  Normal station and gait observed during ingress and egress from the examination room.  Skin: Without significant lesion.  Musculoskeletal: Unremarkable.    Lab/other results:    CMP normal " except glucose 130 and creatinine slightly elevated at 1.31.  CBC normal.  Hemoglobin A1c 6.5.  Thyroid function test normal.  Vitamin D is low at 25.6.  CPK normal.  NMR reveals a total cholesterol 122.  Triglycerides 146.  LDL particle number excellent 787.  Small LDL particle number slightly elevated at 549.  HDL particle number normal at 31.1.    Assessment/Plan:     Diagnosis Plan   1. Impaired fasting glucose     2. Hyperlipidemia     3. Primary hypothyroidism     4. History of colon polyps, 01/14/2016--normal study. 2010--serrated adenoma ×1.       5. Gastroesophageal reflux disease without esophagitis     6. Hypogonadism male, 08/14/2018--TRT initiated.     7. Multiple environmental allergies     8. Obstructive sleep apnea, 09/08/2005--AHI 18.2.  Oxygen saturation 81%.  Patient cannot tolerate CPAP.     9. Vitamin D deficiency     10. Morbidly obese (CMS/HCC)     11. Benign prostatic hypertrophy     12. Mood disorder (CMS/HCC)     13. Chronic right shoulder pain     14. Nontraumatic complete tear of right rotator cuff     15. Therapeutic drug monitoring       It appears that patient's impaired fasting glucose may have evolved into mild overt diabetes but I am reluctant to give him this diagnosis based on one hemoglobin A1c reading.  Instead, we will encourage him to lose weight and get exercise and reassess the situation at his next follow-up.  Hyperlipidemia is under excellent control.  Patient has a history of colon polyps and is up-to-date on his colonoscopy.  Reflux has not been an issue.  Patient has symptomatic hypogonadism and for some reason testosterone levels were not obtained.  I reviewed his previous testosterone levels which were in the sub-therapuetic therapeutic range.  However patient was off of his testosterone supplementation due to recent surgery and he just started back on it about 3 weeks ago.  This actually worked out well because is too early to assess the levels since he just started  back on the topical preparation.  We will reassess those at the next visit.  Patient has sleep apnea and cannot tolerate CPAP.  Vitamin D in the normal range.  pH is mildly symptomatic with nocturia 1-2 times per night.  In regards to his chronic right shoulder pain, he had a rotator cuff repair.  Is doing well.  Also patient had recent [employer] screening which revealed normal carotid arteries, negative for atrial fibrillation, negative for AAA and negative for PAD.  Low probability of osteoporosis.    Plan is as follows: Strongly encourage patient to follow a low carbohydrate diet and lose weight.  We will schedule him for lab, follow-up, subsequent Medicare wellness visit after August 15, 2020 or follow-up as needed.        Procedures

## 2020-07-22 ENCOUNTER — PRIOR AUTHORIZATION (OUTPATIENT)
Dept: INTERNAL MEDICINE | Facility: CLINIC | Age: 70
End: 2020-07-22

## 2020-07-23 ENCOUNTER — TELEPHONE (OUTPATIENT)
Dept: INTERNAL MEDICINE | Facility: CLINIC | Age: 70
End: 2020-07-23

## 2020-08-10 ENCOUNTER — LAB (OUTPATIENT)
Dept: LAB | Facility: HOSPITAL | Age: 70
End: 2020-08-10

## 2020-08-10 DIAGNOSIS — N40.1 BENIGN NON-NODULAR PROSTATIC HYPERPLASIA WITH LOWER URINARY TRACT SYMPTOMS: Chronic | ICD-10-CM

## 2020-08-10 DIAGNOSIS — Z51.81 THERAPEUTIC DRUG MONITORING: ICD-10-CM

## 2020-08-10 DIAGNOSIS — E55.9 VITAMIN D DEFICIENCY: Chronic | ICD-10-CM

## 2020-08-10 DIAGNOSIS — E78.2 MIXED HYPERLIPIDEMIA: Chronic | ICD-10-CM

## 2020-08-10 DIAGNOSIS — E29.1 HYPOGONADISM MALE: Chronic | ICD-10-CM

## 2020-08-10 DIAGNOSIS — E03.9 PRIMARY HYPOTHYROIDISM: Chronic | ICD-10-CM

## 2020-08-10 DIAGNOSIS — R73.01 IMPAIRED FASTING GLUCOSE: ICD-10-CM

## 2020-08-10 LAB
25(OH)D3 SERPL-MCNC: 33.8 NG/ML (ref 30–100)
ALBUMIN SERPL-MCNC: 4 G/DL (ref 3.5–5.2)
ALBUMIN/GLOB SERPL: 1.7 G/DL
ALP SERPL-CCNC: 55 U/L (ref 39–117)
ALT SERPL W P-5'-P-CCNC: 29 U/L (ref 1–41)
ANION GAP SERPL CALCULATED.3IONS-SCNC: 12.8 MMOL/L (ref 5–15)
AST SERPL-CCNC: 19 U/L (ref 1–40)
BACTERIA UR QL AUTO: NORMAL /HPF
BILIRUB SERPL-MCNC: 0.7 MG/DL (ref 0–1.2)
BILIRUB UR QL STRIP: NEGATIVE
BUN SERPL-MCNC: 12 MG/DL (ref 8–23)
BUN/CREAT SERPL: 8.9 (ref 7–25)
CALCIUM SPEC-SCNC: 8.9 MG/DL (ref 8.6–10.5)
CHLORIDE SERPL-SCNC: 105 MMOL/L (ref 98–107)
CK SERPL-CCNC: 119 U/L (ref 20–200)
CLARITY UR: CLEAR
CO2 SERPL-SCNC: 25.2 MMOL/L (ref 22–29)
COLOR UR: YELLOW
CREAT SERPL-MCNC: 1.35 MG/DL (ref 0.76–1.27)
DEPRECATED RDW RBC AUTO: 41.2 FL (ref 37–54)
ERYTHROCYTE [DISTWIDTH] IN BLOOD BY AUTOMATED COUNT: 12.4 % (ref 12.3–15.4)
GFR SERPL CREATININE-BSD FRML MDRD: 52 ML/MIN/1.73
GLOBULIN UR ELPH-MCNC: 2.3 GM/DL
GLUCOSE SERPL-MCNC: 121 MG/DL (ref 65–99)
GLUCOSE UR STRIP-MCNC: NEGATIVE MG/DL
HBA1C MFR BLD: 6.09 % (ref 4.8–5.6)
HCT VFR BLD AUTO: 42.6 % (ref 37.5–51)
HGB BLD-MCNC: 14.7 G/DL (ref 13–17.7)
HGB UR QL STRIP.AUTO: ABNORMAL
HYALINE CASTS UR QL AUTO: NORMAL /LPF
KETONES UR QL STRIP: NEGATIVE
LEUKOCYTE ESTERASE UR QL STRIP.AUTO: NEGATIVE
MCH RBC QN AUTO: 31.3 PG (ref 26.6–33)
MCHC RBC AUTO-ENTMCNC: 34.5 G/DL (ref 31.5–35.7)
MCV RBC AUTO: 90.6 FL (ref 79–97)
NITRITE UR QL STRIP: NEGATIVE
PH UR STRIP.AUTO: 5.5 [PH] (ref 5–8)
PLATELET # BLD AUTO: 215 10*3/MM3 (ref 140–450)
PMV BLD AUTO: 9.3 FL (ref 6–12)
POTASSIUM SERPL-SCNC: 4.4 MMOL/L (ref 3.5–5.2)
PROT SERPL-MCNC: 6.3 G/DL (ref 6–8.5)
PROT UR QL STRIP: NEGATIVE
PSA SERPL-MCNC: 1.16 NG/ML (ref 0–4)
RBC # BLD AUTO: 4.7 10*6/MM3 (ref 4.14–5.8)
RBC # UR: NORMAL /HPF
REF LAB TEST METHOD: NORMAL
SODIUM SERPL-SCNC: 143 MMOL/L (ref 136–145)
SP GR UR STRIP: 1.02 (ref 1–1.03)
SQUAMOUS #/AREA URNS HPF: NORMAL /HPF
T3FREE SERPL-MCNC: 3.56 PG/ML (ref 2–4.4)
T4 FREE SERPL-MCNC: 1.92 NG/DL (ref 0.93–1.7)
TSH SERPL DL<=0.05 MIU/L-ACNC: 0.27 UIU/ML (ref 0.27–4.2)
UROBILINOGEN UR QL STRIP: ABNORMAL
WBC # BLD AUTO: 5.86 10*3/MM3 (ref 3.4–10.8)
WBC UR QL AUTO: NORMAL /HPF

## 2020-08-10 PROCEDURE — 83036 HEMOGLOBIN GLYCOSYLATED A1C: CPT | Performed by: INTERNAL MEDICINE

## 2020-08-10 PROCEDURE — 36415 COLL VENOUS BLD VENIPUNCTURE: CPT

## 2020-08-10 PROCEDURE — 82306 VITAMIN D 25 HYDROXY: CPT | Performed by: INTERNAL MEDICINE

## 2020-08-10 PROCEDURE — 84402 ASSAY OF FREE TESTOSTERONE: CPT | Performed by: INTERNAL MEDICINE

## 2020-08-10 PROCEDURE — 82550 ASSAY OF CK (CPK): CPT | Performed by: INTERNAL MEDICINE

## 2020-08-10 PROCEDURE — 84443 ASSAY THYROID STIM HORMONE: CPT | Performed by: INTERNAL MEDICINE

## 2020-08-10 PROCEDURE — 84153 ASSAY OF PSA TOTAL: CPT | Performed by: INTERNAL MEDICINE

## 2020-08-10 PROCEDURE — 83704 LIPOPROTEIN BLD QUAN PART: CPT | Performed by: INTERNAL MEDICINE

## 2020-08-10 PROCEDURE — 82642 DIHYDROTESTOSTERONE: CPT | Performed by: INTERNAL MEDICINE

## 2020-08-10 PROCEDURE — 85027 COMPLETE CBC AUTOMATED: CPT | Performed by: INTERNAL MEDICINE

## 2020-08-10 PROCEDURE — 84481 FREE ASSAY (FT-3): CPT | Performed by: INTERNAL MEDICINE

## 2020-08-10 PROCEDURE — 81001 URINALYSIS AUTO W/SCOPE: CPT | Performed by: INTERNAL MEDICINE

## 2020-08-10 PROCEDURE — 80053 COMPREHEN METABOLIC PANEL: CPT | Performed by: INTERNAL MEDICINE

## 2020-08-10 PROCEDURE — 80061 LIPID PANEL: CPT | Performed by: INTERNAL MEDICINE

## 2020-08-10 PROCEDURE — 84439 ASSAY OF FREE THYROXINE: CPT | Performed by: INTERNAL MEDICINE

## 2020-08-11 LAB
CHOLEST SERPL-MCNC: 115 MG/DL (ref 100–199)
HDL SERPL-SCNC: 29.9 UMOL/L
HDLC SERPL-MCNC: 33 MG/DL
LDL-P: 771 NMOL/L
LDLC REAL SIZE PAT SERPL: 19.6 NM
LDLC SERPL CALC-MCNC: 49 MG/DL (ref 0–99)
SMALL LDL-P: 656 NMOL/L
TRIGL SERPL-MCNC: 164 MG/DL (ref 0–149)

## 2020-08-13 LAB
DEPRECATED TESTOST FREE FR SERPL: 39.3 NG/DL (ref 40–250)
TESTOST SERPL-MCNC: 238 NG/DL (ref 264–916)
TESTOSTERONE.FREE+WB MFR SERPL: 16.5 % (ref 9–46)

## 2020-08-19 ENCOUNTER — OFFICE VISIT (OUTPATIENT)
Dept: INTERNAL MEDICINE | Facility: CLINIC | Age: 70
End: 2020-08-19

## 2020-08-19 VITALS
HEART RATE: 65 BPM | OXYGEN SATURATION: 98 % | SYSTOLIC BLOOD PRESSURE: 116 MMHG | WEIGHT: 226.6 LBS | BODY MASS INDEX: 34.34 KG/M2 | DIASTOLIC BLOOD PRESSURE: 66 MMHG | HEIGHT: 68 IN

## 2020-08-19 DIAGNOSIS — N40.1 BENIGN NON-NODULAR PROSTATIC HYPERPLASIA WITH LOWER URINARY TRACT SYMPTOMS: Chronic | ICD-10-CM

## 2020-08-19 DIAGNOSIS — K21.9 GASTROESOPHAGEAL REFLUX DISEASE WITHOUT ESOPHAGITIS: Chronic | ICD-10-CM

## 2020-08-19 DIAGNOSIS — R73.01 IMPAIRED FASTING GLUCOSE: Chronic | ICD-10-CM

## 2020-08-19 DIAGNOSIS — Z86.010 HISTORY OF COLON POLYPS: Chronic | ICD-10-CM

## 2020-08-19 DIAGNOSIS — Z51.81 THERAPEUTIC DRUG MONITORING: ICD-10-CM

## 2020-08-19 DIAGNOSIS — E29.1 HYPOGONADISM MALE: Chronic | ICD-10-CM

## 2020-08-19 DIAGNOSIS — F39 MOOD DISORDER (HCC): Chronic | ICD-10-CM

## 2020-08-19 DIAGNOSIS — E78.2 MIXED HYPERLIPIDEMIA: Chronic | ICD-10-CM

## 2020-08-19 DIAGNOSIS — Z00.00 MEDICARE ANNUAL WELLNESS VISIT, SUBSEQUENT: Primary | ICD-10-CM

## 2020-08-19 DIAGNOSIS — E55.9 VITAMIN D DEFICIENCY: Chronic | ICD-10-CM

## 2020-08-19 DIAGNOSIS — Z91.09 MULTIPLE ENVIRONMENTAL ALLERGIES: Chronic | ICD-10-CM

## 2020-08-19 DIAGNOSIS — E66.01 MORBIDLY OBESE (HCC): Chronic | ICD-10-CM

## 2020-08-19 DIAGNOSIS — E78.5 HYPERLIPIDEMIA, UNSPECIFIED HYPERLIPIDEMIA TYPE: Chronic | ICD-10-CM

## 2020-08-19 DIAGNOSIS — E03.9 PRIMARY HYPOTHYROIDISM: Chronic | ICD-10-CM

## 2020-08-19 DIAGNOSIS — G47.33 OBSTRUCTIVE SLEEP APNEA: Chronic | ICD-10-CM

## 2020-08-19 PROCEDURE — 99214 OFFICE O/P EST MOD 30 MIN: CPT | Performed by: INTERNAL MEDICINE

## 2020-08-19 PROCEDURE — 96160 PT-FOCUSED HLTH RISK ASSMT: CPT | Performed by: INTERNAL MEDICINE

## 2020-08-19 PROCEDURE — G0439 PPPS, SUBSEQ VISIT: HCPCS | Performed by: INTERNAL MEDICINE

## 2020-08-19 RX ORDER — EZETIMIBE 10 MG/1
TABLET ORAL
Qty: 90 TABLET | Refills: 3
Start: 2020-08-19 | End: 2020-01-01 | Stop reason: SDUPTHER

## 2020-08-19 RX ORDER — SIMVASTATIN 40 MG
TABLET ORAL
Qty: 90 TABLET | Refills: 3
Start: 2020-08-19 | End: 2020-01-01 | Stop reason: SDUPTHER

## 2020-08-19 RX ORDER — TESTOSTERONE 16.2 MG/G
GEL TRANSDERMAL
Qty: 450 G | Refills: 1 | Status: SHIPPED | OUTPATIENT
Start: 2020-08-19 | End: 2021-01-01 | Stop reason: SDUPTHER

## 2020-08-19 RX ORDER — TESTOSTERONE 16.2 MG/G
GEL TRANSDERMAL
Qty: 450 G | Refills: 1 | Status: SHIPPED | OUTPATIENT
Start: 2020-08-19 | End: 2020-08-19 | Stop reason: SDUPTHER

## 2020-08-19 NOTE — PROGRESS NOTES
08/19/2020    Patient Information  Nilson Buchanan                                                                                          [address] [facility] [place] [address]      1950  [unfilled]  There is no work phone number on file.    Chief Complaint:     Subsequent Medicare wellness visit.  Follow-up lab work in order to monitor chronic medical issues listed in history of present illness.  No new acute complaints.    History of Present Illness:    Patient with a history of impaired fasting glucose, hyperlipidemia, hypothyroidism, history of colon polyps, esophageal reflux, hypogonadism, environmental allergies, sleep apnea, obesity, BPH, mood disorder, vitamin D deficiency.  He presents today for subsequent Medicare wellness visit.  Patient also had lab work in order to monitor his chronic medical issues.  His past medical history reviewed and updated were necessary including health maintenance parameters.  This reveals he will be up-to-date or else accounted for after today's visit.    Review of Systems   Constitution: Negative.   HENT: Negative.    Eyes: Negative.    Cardiovascular: Negative.    Respiratory: Negative.    Endocrine: Negative.    Hematologic/Lymphatic: Negative.    Skin: Negative.    Musculoskeletal: Negative.    Gastrointestinal: Negative.    Genitourinary: Negative.    Neurological: Negative.    Psychiatric/Behavioral: Negative.    Allergic/Immunologic: Negative.        Active Problems:    Patient Active Problem List   Diagnosis   • Allergic rhinitis   • History of colon polyps, 01/14/2016--normal study. 2010--serrated adenoma ×1.     • Gastroesophageal reflux disease without esophagitis   • Hyperlipidemia   • Hypogonadism male, 08/14/2018--TRT initiated.   • Primary hypothyroidism   • Impaired fasting glucose   • Male erectile disorder   • Multiple environmental allergies   • Obstructive sleep apnea, 09/08/2005--AHI 18.2.  Oxygen saturation 81%.  Patient cannot  tolerate CPAP.   • Vitamin D deficiency   • Therapeutic drug monitoring   • Morbidly obese (CMS/HCC)   • Benign prostatic hypertrophy   • Routine physical examination   • Mood disorder (CMS/HCC)         Past Medical History:   Diagnosis Date   • Allergic rhinitis 2/21/2014    Patient reports he had allergy testing several years ago in his mid 20s. He reports allergies to molds. He was on immunotherapy for approximately one year and then this was discontinued. He was told he did not need it.   02/21/2014--Flonase 2 sprays each nostril daily initiated.   • Benign prostatic hypertrophy 2/9/2017 02/29/2017--patient reports sudden onset a few days ago with dysuria and BPH obstructive symptoms.  He had intercourse recently and after that begin to have burning with urination which has continued.  He subsequently developed intermittent obstructive symptoms consisting of urgency, weak stream, dribbling and sensation of incomplete voiding.  Digital rectal examination avoided today.  Urinalysis, urine culture, PSA ordered.  Empiric Cipro 500 mg by mouth twice a day ×30 days, Flomax 0.4 mg by mouth daily.  I will have patient follow-up after completion of antibiotics with a PSA 2 days after completion of the antibiotics providing his PSA comes back elevated or above baseline.   • Gastroesophageal reflux disease without esophagitis 4/29/2016   • History of acute prostatitis 2/9/2017    03/10/2017--patient seen in follow-up and his symptoms resolved.  PSA is 0.630.  PSA at the initiation of treatment was 1.03.  Urinalysis and urine culture returned negative.  02/29/2017--patient reports sudden onset a few days ago with dysuria and BPH obstructive symptoms.  He had intercourse recently and after that begin to have burning with urination which has continued.  He subsequently developed intermittent obstructive symptoms consisting of urgency, weak stream, dribbling and sensation of incomplete voiding.  Digital rectal examination  avoided today.  Urinalysis, urine culture, PSA ordered.  Empiric Cipro 500 mg by mouth twice a day ×30 days, Flomax 0.4 mg by mouth daily.  I will have patient follow-up after completion of antibiotics with a PSA 2 days after completion of the antibiotics providing his PSA comes back elevated or above baseline.   • History of BPPV (benign paroxysmal positional vertigo) 6/20/2016 12/19/2016--patient reports symptoms have resolved.  06/20/2016--patient presents with a new complaint of a several week history of intermittent episodes of dizziness described as a spinning sensation.  It is always associated with abrupt standing for lying down to go to bed.  Only lasts for a very short period of time and there is no other associated symptoms.  He does not feel the symptoms are bad enough at the present time to warrant vestibular therapy.   • History of colon polyps, 01/14/2016--normal study. 2010--serrated adenoma ×1.   5/10/2010    01/14/2016--normal colonoscopy.  Recommended repeat study in 10 years.  05/10/2010--colonoscopy revealed a 3 mm cecal polyp that was removed completely. The remainder of the colonoscopy was normal. Recommend repeat colonoscopy in 5 years. If the polyp is adenomatous, would recommend all first-degree relatives over age 40 be in screening as well. Pathology returned serrated adenoma.   • History of pneumonia 12/06/2014 12/06/2014--patient seen in follow up and reports he is definitely better but he is still having a cough that seems to come and go. This been no fever. He still has some shortness of breath but this is better. Lung examination reveals continued bibasilar crackles but they are somewhat improved over previous. I reviewed the chest x-ray results and even though the chest x-ray shows no definite pneum   • History of Thoracic compression fracture 03/28/2004 03/28/2004--patient fell and suffered a moderate T12 compression fracture. No subluxation or retropulsion of fracture  fragments is seen. Approximately 40% loss of anterior vertebral body height.   • Hyperlipidemia 4/29/2016   • Hypogonadism male, 08/14/2018--TRT initiated. 6/10/2013    02/21/2014--treatment for hypothyroidism discontinued. Patient did not feel any better with the testosterone. It also is extremely expensive.  06/10/2013--treatment for hypogonadism begun.      • Hypothyroidism 4/29/2016   • Impaired fasting glucose 4/29/2016   • Male erectile disorder 11/23/2015 11/23/2015--patient presents with complaints of erectile dysfunction. He does have borderline hypogonadism but previous treatment was not particularly effective. I gave him samples of Stendra, Viagra, Staxyn.   • Mood disorder (CMS/[facility]) 1/30/2019   • Multiple environmental allergies 4/29/2016    Patient reports he had allergy testing several years ago in his mid 20s. He reports allergies to molds. He was on immunotherapy for approximately one year and then this was discontinued. He was told he did not need it.   • Obstructive sleep apnea, 09/08/2005--AHI 18.2.  Oxygen saturation 81%.  Patient cannot tolerate CPAP. 9/8/2005 09/08/2005--sleep study revealed an apnea/hypopnea index for total sleep time to be moderately abnormal at 18.2. Lowest oxygen saturation 81%. CPAP was tried but patient is intolerant.   • Primary hypothyroidism 4/29/2016   • Vitamin D deficiency 4/29/2016         Past Surgical History:   Procedure Laterality Date   • ANKLE SURGERY Left 2004 2004--repair of left ankle fracture with hardware placement.   • CARPAL TUNNEL RELEASE Right 1989 1989--right carpal tunnel release.   • CATARACT EXTRACTION, BILATERAL  08/2019 August 2019--bilateral cataract extirpation with intraocular lens implantation.   • COLONOSCOPY  05/10/2010    05/10/2010--colonoscopy revealed a 3 mm cecal polyp that was removed completely. The remainder of the colonoscopy was normal. Recommend repeat colonoscopy in 5 years. If the polyp is adenomatous, would  recommend all first-degree relatives over age 40 be in screening as well. Pathology returned serrated adenoma.   • COLONOSCOPY  2016--normal colonoscopy.  Recommended repeat study in 10 years.   • LAPAROSCOPIC CHOLECYSTECTOMY  2012--laparoscopic cholecystectomy. Pathology returned cholesterolosis.   • SHOULDER ARTHROSCOPY DISTAL CLAVICLE EXCISION AND OPEN ROTATOR CUFF REPAIR  10/23/2019    2019--right shoulder arthroscopy with rotator cuff repair, subacromial decompression, and extensive debridement.  Open distal clavicle excision and open subpectoral biceps tenodesis.   • TONSILLECTOMY  Childhood    Childhood tonsillectomy         Allergies   Allergen Reactions   • Cortisone Other (See Comments)     Locks up patient jjoint           Current Outpatient Medications:   •  aspirin 81 MG tablet, Take 1 tablet by mouth Daily., Disp: , Rfl:   •  Cholecalciferol (VITAMIN D3) 5000 UNITS capsule capsule, Take 1 capsule by mouth daily., Disp: , Rfl:   •  ezetimibe (ZETIA) 10 MG tablet, Take 1 p.o. daily for high cholesterol, Disp: 90 tablet, Rfl: 3  •  fluticasone (FLONASE) 50 MCG/ACT nasal spray, 2 sprays by Each Nare route Daily., Disp: 16 g, Rfl: 8  •  levothyroxine (SYNTHROID) 150 MCG tablet, One by mouth daily for low thyroid, Disp: 90 tablet, Rfl: 3  •  simvastatin (ZOCOR) 40 MG tablet, 1 p.o. daily for high cholesterol, Disp: 90 tablet, Rfl: 3  •  Testosterone (AndroGel Pump) 20.25 MG/ACT (1.62%) gel, Apply a total of 4 pumps daily as directed, Disp: 450 g, Rfl: 1  •  Chlorcyclizine-Pseudoephed (Stahist AD) 25-60 MG tablet, Take 1 p.o. twice daily for allergy/congestion, Disp: 180 tablet, Rfl: 3  •  OXcarbazepine (TRILEPTAL) 300 MG tablet, Take 1 by mouth daily, Disp: 30 tablet, Rfl: 6      Family History   Problem Relation Age of Onset   • Cancer Father         Liver Cancer. Father  from liver cancer.   • Heart attack Brother         Acute Myocardial Infarction.   "Brother had a myocardial infarction at age 68.         Social History     Socioeconomic History   • Marital status:      Spouse name: Not on file   • Number of children: 2   • Years of education: Not on file   • Highest education level: 12th grade   Occupational History   • Occupation: Retired - [employer]   Social Needs   • Financial resource strain: Not hard at all   • Food insecurity:     Worry: Never true     Inability: Never true   • Transportation needs:     Medical: No     Non-medical: No   Tobacco Use   • Smoking status: Former Smoker   • Smokeless tobacco: Never Used   • Tobacco comment: Stopped smoking at age 35.   Substance and Sexual Activity   • Alcohol use: Yes     Frequency: Monthly or less     Drinks per session: 1 or 2     Comment: Moderately   • Drug use: No   • Sexual activity: Yes     Partners: Female   Lifestyle   • Physical activity:     Days per week: 7 days     Minutes per session: 120 min   • Stress: Not at all   Relationships   • Social connections:     Talks on phone: More than three times a week     Gets together: Three times a week     Attends Denominational service: More than 4 times per year     Active member of club or organization: No     Attends meetings of clubs or organizations: Never     Relationship status:          Vitals:    08/19/20 0828   BP: 116/66   BP Location: Right arm   Patient Position: Sitting   Cuff Size: Large Adult   Pulse: 65   SpO2: 98%   Weight: 103 kg (226 lb 9.6 oz)   Height: 172 cm (67.72\")        Body mass index is 34.74 kg/m².      Physical Exam:    General: Alert and oriented x 3.  No acute distress.  Normal affect.  Obese.  HEENT: Pupils equal, round, reactive to light; extraocular movements intact; sclerae nonicteric; pharynx, ear canals and TMs normal.  Neck: Without JVD, thyromegaly, bruit, or adenopathy.  Lungs: Clear to auscultation in all fields.  Heart: Regular rate and rhythm without murmur, rub, gallop, or click.  Abdomen: Soft, nontender, " without hepatosplenomegaly or hernia.  Bowel sounds normal.  : Deferred.  Rectal: Deferred.  Extremities: Without clubbing, cyanosis, edema, or pulse deficit.  Neurologic: Intact without focal deficit.  Normal station and gait observed during ingress and egress from the examination room.  Skin: Without significant lesion.  Musculoskeletal: Unremarkable.    Lab/other results:    NMR reveals total cholesterol 115.  Triglycerides slightly elevated 164.  LDL particle number excellent 771.  Small LDL particle number elevated at 656.  HDL particle number little low at 29.9.  CMP normal except glucose 121, creatinine slightly elevated 1.35.  Hemoglobin A1c 6.09.  Vitamin D normal.  CBC normal.  CPK normal.  Urinalysis reveals trace blood but microscopic was negative.  TSH slightly suppressed at 0.265.  Free T4 mildly elevated at 1.92.  Free T3 is normal.  PSA normal at 1.16.  Total testosterone is low at 238 and free and weakly bound testosterone low at 39.3.    Assessment/Plan:     Diagnosis Plan   1. Medicare annual wellness visit, subsequent     2. Impaired fasting glucose  Comprehensive Metabolic Panel    Hemoglobin A1c   3. Hyperlipidemia  CK    Comprehensive Metabolic Panel    NMR LipoProfile    simvastatin (ZOCOR) 40 MG tablet    ezetimibe (ZETIA) 10 MG tablet   4. Primary hypothyroidism  TSH    T4, Free    T3, Free   5. History of colon polyps, 01/14/2016--normal study. 2010--serrated adenoma ×1.       6. Gastroesophageal reflux disease without esophagitis     7. Hypogonadism male, 08/14/2018--TRT initiated.  Testosterone,Free+Weakly Bound   8. Multiple environmental allergies  Chlorcyclizine-Pseudoephed (Stahist AD) 25-60 MG tablet   9. Obstructive sleep apnea, 09/08/2005--AHI 18.2.  Oxygen saturation 81%.  Patient cannot tolerate CPAP.     10. Morbidly obese (CMS/HCC)     11. Benign prostatic hypertrophy  PSA DIAGNOSTIC   12. Mood disorder (CMS/HCC)     13. Vitamin D deficiency  Vitamin D 25 Hydroxy   14.  Therapeutic drug monitoring  CBC (No Diff)    Testosterone,Free+Weakly Bound   15. Hyperlipidemia, unspecified hyperlipidemia type     16. Hypogonadism male, patient elects no TRT.  Testosterone (AndroGel Pump) 20.25 MG/ACT (1.62%) gel     The subsequent Medicare wellness visit is documented on a separate note.    Patient has impaired fasting glucose that does not require medication but I strongly encourage patient to follow a low carbohydrate diet, exercise, and lose weight.  Hyperlipidemia is under reasonable control.  His thyroid supplementation is a little supratherapeutic but not enough for me to make any changes at this time.  Patient has a history of colon polyps and is up-to-date on his colonoscopy.  Esophageal reflux currently not an issue.  Patient has symptomatic hypogonadism and his testosterone levels are low and I suspect this is because he has not been using his testosterone supplementation.  Environmental allergies reasonably controlled on the current medication.  Patient has sleep apnea but cannot tolerate CPAP.  He has BPH which is asymptomatic.  His mood disorder is well controlled with Trileptal and being treated by a therapist.  Vitamin D in the normal range.    Plan is as follows: Increase AndroGel generic to total four pumps per day.  Strongly encouraged patient to follow a low carbohydrate diet, exercise, and lose weight.  I will have him follow-up in 6 months with lab prior or follow-up as needed.    Procedures

## 2020-08-19 NOTE — PROGRESS NOTES
The ABCs of the Annual Wellness Visit  Subsequent Medicare Wellness Visit     No chief complaint on file.      Subjective   History of Present Illness:  Nilson Buchanan is a 70 y.o. male who presents for a Subsequent Medicare Wellness Visit.    HEALTH RISK ASSESSMENT    Recent Hospitalizations:  No hospitalization(s) within the last year.    Current Medical Providers:  Patient Care Team:  Marco Kenney MD as PCP - General (Internal Medicine)    Smoking Status:  Social History     Tobacco Use   Smoking Status Former Smoker   Smokeless Tobacco Never Used   Tobacco Comment    Stopped smoking at age 35.       Alcohol Consumption:  Social History     Substance and Sexual Activity   Alcohol Use Yes   • Frequency: Monthly or less   • Drinks per session: 1 or 2    Comment: Moderately       Depression Screen:   PHQ-2/PHQ-9 Depression Screening 8/19/2020   Little interest or pleasure in doing things 0   Feeling down, depressed, or hopeless 0   Trouble falling or staying asleep, or sleeping too much -   Feeling tired or having little energy -   Poor appetite or overeating -   Feeling bad about yourself - or that you are a failure or have let yourself or your family down -   Trouble concentrating on things, such as reading the newspaper or watching television -   Moving or speaking so slowly that other people could have noticed. Or the opposite - being so fidgety or restless that you have been moving around a lot more than usual -   Thoughts that you would be better off dead, or of hurting yourself in some way -   Total Score 0   If you checked off any problems, how difficult have these problems made it for you to do your work, take care of things at home, or get along with other people? -       Fall Risk Screen:  STEADI Fall Risk Assessment was completed, and patient is at LOW risk for falls.Assessment completed on:8/19/2020    Health Habits and Functional and Cognitive Screening:  Functional & Cognitive Status 8/19/2020    Do you have difficulty preparing food and eating? No   Do you have difficulty bathing yourself, getting dressed or grooming yourself? No   Do you have difficulty using the toilet? No   Do you have difficulty moving around from place to place? No   Do you have trouble with steps or getting out of a bed or a chair? No   Current Diet Well Balanced Diet   Dental Exam Up to date   Eye Exam Up to date   Exercise (times per week) 7 times per week   Current Exercise Activities Include Walking   Do you need help using the phone?  No   Are you deaf or do you have serious difficulty hearing?  No   Do you need help with transportation? No   Do you need help shopping? No   Do you need help preparing meals?  No   Do you need help with housework?  No   Do you need help with laundry? No   Do you need help taking your medications? No   Do you need help managing money? No   Do you ever drive or ride in a car without wearing a seat belt? No   Have you felt unusual stress, anger or loneliness in the last month? No   Who do you live with? Other   If you need help, do you have trouble finding someone available to you? No   Have you been bothered in the last four weeks by sexual problems? No   Do you have difficulty concentrating, remembering or making decisions? No         Does the patient have evidence of cognitive impairment? No    Asprin use counseling:Taking ASA appropriately as indicated    Age-appropriate Screening Schedule:  Refer to the list below for future screening recommendations based on patient's age, sex and/or medical conditions. Orders for these recommended tests are listed in the plan section. The patient has been provided with a written plan.    Health Maintenance   Topic Date Due   • INFLUENZA VACCINE  08/01/2020   • COLONOSCOPY  01/14/2021   • LIPID PANEL  08/10/2021   • TDAP/TD VACCINES (2 - Td) 10/18/2023   • ZOSTER VACCINE  Discontinued          The following portions of the patient's history were reviewed and  updated as appropriate: allergies, current medications, past family history, past medical history, past social history, past surgical history and problem list.    Outpatient Medications Prior to Visit   Medication Sig Dispense Refill   • aspirin 81 MG tablet Take 1 tablet by mouth Daily.     • Cholecalciferol (VITAMIN D3) 5000 UNITS capsule capsule Take 1 capsule by mouth daily.     • fluticasone (FLONASE) 50 MCG/ACT nasal spray 2 sprays by Each Nare route Daily. 16 g 8   • levothyroxine (SYNTHROID) 150 MCG tablet One by mouth daily for low thyroid 90 tablet 3   • ezetimibe (ZETIA) 10 MG tablet TAKE 1 TABLET DAILY 90 tablet 4   • simvastatin (ZOCOR) 40 MG tablet TAKE 1 TABLET EVERY NIGHT (NEED LABS AND FOLLOW UP AS SOON AS POSSIBLE) 90 tablet 4   • Testosterone (ANDROGEL PUMP) 20.25 MG/ACT (1.62%) gel Apply a total of 2 pumps daily as directed 75 g 5   • OXcarbazepine (TRILEPTAL) 300 MG tablet Take 1 by mouth daily 30 tablet 6     No facility-administered medications prior to visit.        Patient Active Problem List   Diagnosis   • Allergic rhinitis   • History of colon polyps, 01/14/2016--normal study. 2010--serrated adenoma ×1.     • Gastroesophageal reflux disease without esophagitis   • Hyperlipidemia   • Hypogonadism male, 08/14/2018--TRT initiated.   • Primary hypothyroidism   • Impaired fasting glucose   • Male erectile disorder   • Multiple environmental allergies   • Obstructive sleep apnea, 09/08/2005--AHI 18.2.  Oxygen saturation 81%.  Patient cannot tolerate CPAP.   • Vitamin D deficiency   • Therapeutic drug monitoring   • Morbidly obese (CMS/HCC)   • Benign prostatic hypertrophy   • Routine physical examination   • Mood disorder (CMS/HCC)       Advanced Care Planning:  ACP discussion was held with the patient during this visit. Patient has an advance directive in EMR which is still valid.     Review of Systems   Constitutional: Negative.    HENT: Positive for congestion.    Eyes: Negative.   "  Respiratory: Negative.    Cardiovascular: Negative.    Gastrointestinal: Negative.    Endocrine: Negative.    Genitourinary: Negative.    Musculoskeletal: Negative.    Skin: Negative.    Allergic/Immunologic: Positive for environmental allergies.   Neurological: Negative.    Hematological: Negative.    Psychiatric/Behavioral: Negative.        Compared to one year ago, the patient feels his physical health is better.  Compared to one year ago, the patient feels his mental health is better.    Reviewed chart for potential of high risk medication in the elderly: yes  Reviewed chart for potential of harmful drug interactions in the elderly:yes    Objective         Vitals:    08/19/20 0828   BP: 116/66   BP Location: Right arm   Patient Position: Sitting   Cuff Size: Large Adult   Pulse: 65   SpO2: 98%   Weight: 103 kg (226 lb 9.6 oz)   Height: 172 cm (67.72\")   PainSc: 0-No pain       Body mass index is 34.74 kg/m².  Discussed the patient's BMI with him. The BMI is above average; BMI management plan is completed.    Physical Exam     NMR reveals total cholesterol 115.  Triglycerides slightly elevated 164.  LDL particle number excellent 771.  Small LDL particle number elevated at 656.  HDL particle number little low at 29.9.  CMP normal except glucose 121, creatinine slightly elevated 1.35.  Hemoglobin A1c 6.09.  Vitamin D normal.  CBC normal.  CPK normal.  Urinalysis reveals trace blood but microscopic was negative.  TSH slightly suppressed at 0.265.  Free T4 mildly elevated at 1.92.  Free T3 is normal.  PSA normal at 1.16.  Total testosterone is low at 238 and free and weakly bound testosterone low at 39.3.    Lab Results   Component Value Date    CHLPL 115 08/10/2020    TRIG 164 (H) 08/10/2020    HGBA1C 6.09 (H) 08/10/2020        Assessment/Plan   Medicare Risks and Personalized Health Plan  CMS Preventative Services Quick Reference  Advance Directive Discussion  Cardiovascular risk  Colon Cancer Screening  Diabetic " Lab Screening   Obesity/Overweight   Prostate Cancer Screening     The above risks/problems have been discussed with the patient.  Pertinent information has been shared with the patient in the After Visit Summary.  Follow up plans and orders are seen below in the Assessment/Plan Section.    Diagnoses and all orders for this visit:    1. Medicare annual wellness visit, subsequent (Primary)    2. Impaired fasting glucose  -     Comprehensive Metabolic Panel; Future  -     Hemoglobin A1c; Future    3. Hyperlipidemia  -     CK; Future  -     Comprehensive Metabolic Panel; Future  -     NMR LipoProfile; Future  -     simvastatin (ZOCOR) 40 MG tablet; 1 p.o. daily for high cholesterol  Dispense: 90 tablet; Refill: 3  -     ezetimibe (ZETIA) 10 MG tablet; Take 1 p.o. daily for high cholesterol  Dispense: 90 tablet; Refill: 3    4. Primary hypothyroidism  -     TSH; Future  -     T4, Free; Future  -     T3, Free; Future    5. History of colon polyps, 01/14/2016--normal study. 2010--serrated adenoma ×1.      6. Gastroesophageal reflux disease without esophagitis    7. Hypogonadism male, 08/14/2018--TRT initiated.  -     Testosterone,Free+Weakly Bound; Future  -     Discontinue: Testosterone (AndroGel Pump) 20.25 MG/ACT (1.62%) gel; Apply a total of 4 pumps daily as directed  Dispense: 450 g; Refill: 1  -     Testosterone (AndroGel Pump) 20.25 MG/ACT (1.62%) gel; Apply a total of 4 pumps daily as directed  Dispense: 450 g; Refill: 1    8. Multiple environmental allergies  -     Chlorcyclizine-Pseudoephed (Stahist AD) 25-60 MG tablet; Take 1 p.o. twice daily for allergy/congestion  Dispense: 180 tablet; Refill: 3    9. Obstructive sleep apnea, 09/08/2005--AHI 18.2.  Oxygen saturation 81%.  Patient cannot tolerate CPAP.    10. Morbidly obese (CMS/HCC)    11. Benign prostatic hypertrophy  -     PSA DIAGNOSTIC; Future    12. Mood disorder (CMS/HCC)    13. Vitamin D deficiency  -     Vitamin D 25 Hydroxy; Future    14. Therapeutic  drug monitoring  -     CBC (No Diff); Future  -     Testosterone,Free+Weakly Bound; Future    15. Hyperlipidemia, unspecified hyperlipidemia type    16. Hypogonadism male, patient elects no TRT.  -     Testosterone,Free+Weakly Bound; Future  -     Discontinue: Testosterone (AndroGel Pump) 20.25 MG/ACT (1.62%) gel; Apply a total of 4 pumps daily as directed  Dispense: 450 g; Refill: 1  -     Testosterone (AndroGel Pump) 20.25 MG/ACT (1.62%) gel; Apply a total of 4 pumps daily as directed  Dispense: 450 g; Refill: 1      Follow Up:  No follow-ups on file.     An After Visit Summary and PPPS were given to the patient.

## 2020-08-25 DIAGNOSIS — J30.1 CHRONIC SEASONAL ALLERGIC RHINITIS DUE TO POLLEN: Primary | Chronic | ICD-10-CM

## 2020-08-25 DIAGNOSIS — Z91.09 MULTIPLE ENVIRONMENTAL ALLERGIES: Chronic | ICD-10-CM

## 2020-10-21 NOTE — TELEPHONE ENCOUNTER
Patient called and [facility] wouldn't fill his script. Would like it called into his local pharmacy.Best call back for pt [phone]    Giovanny LO    Verified pharmacy-[id number] - [place], [place] - [address] [place] [address] [address] - [phone] PH - [phone] FX  [phone]  Associate Signed OrdersPatient EstimateProvidersCurrent Interactions

## 2020-11-09 PROBLEM — R35.0 URINARY FREQUENCY: Status: ACTIVE | Noted: 2020-01-01

## 2020-11-09 PROBLEM — R10.9 RIGHT FLANK PAIN: Status: ACTIVE | Noted: 2020-01-01

## 2020-11-09 PROBLEM — R39.15 URINARY URGENCY: Status: ACTIVE | Noted: 2020-01-01

## 2020-11-09 NOTE — PROGRESS NOTES
11/09/2020    Patient Information  Nilson Buchanan                                                                                          [address] [facility] [address]      1950  [unfilled]  There is no work phone number on file.    Chief Complaint:     Complaining of right flank/back pain    History of Present Illness:    Patient with a history of colon polyps, esophageal reflux, hyperlipidemia, hypogonadism, hypothyroidism, impaired fasting glucose, environmental allergies, sleep apnea, obesity, BPH.  He presents today with complaints of right-sided lower back pain/flank pain as described below.  Past medical history reviewed and updated were necessary including health maintenance parameters.  This reveals he needs influenza vaccine.    History regarding right flank/right back pain:    November 9, 2020--patient presents with approximately 3-week history of intermittent episodes of right flank pain that at times is very sharp and stabbing.  It does seem to be positionally related such as bending over or standing or sitting in his  at times.  He also has some pain across his low back as well.  He is not noticed any blood in his urine and has had no nausea or vomiting.  He has noticed intermittent urination with hesitancy however.  He has had no fever or chills.  Also is not noticed a rash on his right flank.  Physical exam is not particularly remarkable.  There is perhaps some tenderness to percussion over the right lower back/flank.  Automated urinalysis was negative.  Stat CT scan of the abdomen pelvis with renal stone protocol ordered and revealed no evidence of stone and no intra abdominal pathology.  It appears that patient's pain is likely musculoskeletal although it is a little atypical for that.  Physical therapy recommended but patient is not quite ready for that.  Plan is to treat with naproxen 500 mg p.o. twice daily along with Flexeril 1 p.o. 3 times daily  as needed muscle relaxer.  Patient instructed to contact me if his symptoms persist and certainly if they change significantly such as for the worse.    Review of Systems   Constitution: Negative. Negative for fever and weight loss.   HENT: Negative.    Eyes: Negative.    Cardiovascular: Negative.  Negative for chest pain.   Respiratory: Negative.    Endocrine: Negative.    Hematologic/Lymphatic: Negative.    Skin: Negative.    Musculoskeletal: Positive for back pain.   Gastrointestinal: Negative.  Negative for abdominal pain and bowel incontinence.   Genitourinary: Positive for bladder incontinence, frequency, pelvic pain and urgency. Negative for dysuria.   Neurological: Negative.  Negative for headaches, numbness, paresthesias and weakness.   Psychiatric/Behavioral: Negative.    Allergic/Immunologic: Negative.        Active Problems:    Patient Active Problem List   Diagnosis   • Allergic rhinitis   • History of colon polyps, 01/14/2016--normal study. 2010--serrated adenoma ×1.     • Gastroesophageal reflux disease without esophagitis   • Hyperlipidemia   • Hypogonadism male, 08/14/2018--TRT initiated.   • Primary hypothyroidism   • Impaired fasting glucose   • Male erectile disorder   • Multiple environmental allergies   • Obstructive sleep apnea, 09/08/2005--AHI 18.2.  Oxygen saturation 81%.  Patient cannot tolerate CPAP.   • Vitamin D deficiency   • Therapeutic drug monitoring   • Morbidly obese (CMS/HCC)   • Benign non-nodular prostatic hyperplasia with lower urinary tract symptoms   • Routine physical examination   • Mood disorder (CMS/HCC)   • Right flank pain   • Urinary urgency   • Urinary frequency         Past Medical History:   Diagnosis Date   • Allergic rhinitis 2/21/2014    Patient reports he had allergy testing several years ago in his mid 20s. He reports allergies to molds. He was on immunotherapy for approximately one year and then this was discontinued. He was told he did not need it.    02/21/2014--Flonase 2 sprays each nostril daily initiated.   • Benign prostatic hypertrophy 2/9/2017 02/29/2017--patient reports sudden onset a few days ago with dysuria and BPH obstructive symptoms.  He had intercourse recently and after that begin to have burning with urination which has continued.  He subsequently developed intermittent obstructive symptoms consisting of urgency, weak stream, dribbling and sensation of incomplete voiding.  Digital rectal examination avoided today.  Urinalysis, urine culture, PSA ordered.  Empiric Cipro 500 mg by mouth twice a day ×30 days, Flomax 0.4 mg by mouth daily.  I will have patient follow-up after completion of antibiotics with a PSA 2 days after completion of the antibiotics providing his PSA comes back elevated or above baseline.   • Gastroesophageal reflux disease without esophagitis 4/29/2016   • History of acute prostatitis 2/9/2017    03/10/2017--patient seen in follow-up and his symptoms resolved.  PSA is 0.630.  PSA at the initiation of treatment was 1.03.  Urinalysis and urine culture returned negative.  02/29/2017--patient reports sudden onset a few days ago with dysuria and BPH obstructive symptoms.  He had intercourse recently and after that begin to have burning with urination which has continued.  He subsequently developed intermittent obstructive symptoms consisting of urgency, weak stream, dribbling and sensation of incomplete voiding.  Digital rectal examination avoided today.  Urinalysis, urine culture, PSA ordered.  Empiric Cipro 500 mg by mouth twice a day ×30 days, Flomax 0.4 mg by mouth daily.  I will have patient follow-up after completion of antibiotics with a PSA 2 days after completion of the antibiotics providing his PSA comes back elevated or above baseline.   • History of BPPV (benign paroxysmal positional vertigo) 6/20/2016 12/19/2016--patient reports symptoms have resolved.  06/20/2016--patient presents with a new complaint of a  several week history of intermittent episodes of dizziness described as a spinning sensation.  It is always associated with abrupt standing for lying down to go to bed.  Only lasts for a very short period of time and there is no other associated symptoms.  He does not feel the symptoms are bad enough at the present time to warrant vestibular therapy.   • History of colon polyps, 01/14/2016--normal study. 2010--serrated adenoma ×1.   5/10/2010    01/14/2016--normal colonoscopy.  Recommended repeat study in 10 years.  05/10/2010--colonoscopy revealed a 3 mm cecal polyp that was removed completely. The remainder of the colonoscopy was normal. Recommend repeat colonoscopy in 5 years. If the polyp is adenomatous, would recommend all first-degree relatives over age 40 be in screening as well. Pathology returned serrated adenoma.   • History of pneumonia 12/06/2014 12/06/2014--patient seen in follow up and reports he is definitely better but he is still having a cough that seems to come and go. This been no fever. He still has some shortness of breath but this is better. Lung examination reveals continued bibasilar crackles but they are somewhat improved over previous. I reviewed the chest x-ray results and even though the chest x-ray shows no definite pneum   • History of Thoracic compression fracture 03/28/2004 03/28/2004--patient fell and suffered a moderate T12 compression fracture. No subluxation or retropulsion of fracture fragments is seen. Approximately 40% loss of anterior vertebral body height.   • Hyperlipidemia 4/29/2016   • Hypogonadism male, 08/14/2018--TRT initiated. 6/10/2013    02/21/2014--treatment for hypothyroidism discontinued. Patient did not feel any better with the testosterone. It also is extremely expensive.  06/10/2013--treatment for hypogonadism begun.      • Hypothyroidism 4/29/2016   • Impaired fasting glucose 4/29/2016   • Male erectile disorder 11/23/2015 11/23/2015--patient  presents with complaints of erectile dysfunction. He does have borderline hypogonadism but previous treatment was not particularly effective. I gave him samples of Stendra, Viagra, Staxyn.   • Mood disorder (CMS/[facility]) 1/30/2019   • Multiple environmental allergies 4/29/2016    Patient reports he had allergy testing several years ago in his mid 20s. He reports allergies to molds. He was on immunotherapy for approximately one year and then this was discontinued. He was told he did not need it.   • Obstructive sleep apnea, 09/08/2005--AHI 18.2.  Oxygen saturation 81%.  Patient cannot tolerate CPAP. 9/8/2005 09/08/2005--sleep study revealed an apnea/hypopnea index for total sleep time to be moderately abnormal at 18.2. Lowest oxygen saturation 81%. CPAP was tried but patient is intolerant.   • Primary hypothyroidism 4/29/2016   • Vitamin D deficiency 4/29/2016         Past Surgical History:   Procedure Laterality Date   • ANKLE SURGERY Left 2004 2004--repair of left ankle fracture with hardware placement.   • CARPAL TUNNEL RELEASE Right 1989 1989--right carpal tunnel release.   • CATARACT EXTRACTION, BILATERAL  08/2019 August 2019--bilateral cataract extirpation with intraocular lens implantation.   • COLONOSCOPY  05/10/2010    05/10/2010--colonoscopy revealed a 3 mm cecal polyp that was removed completely. The remainder of the colonoscopy was normal. Recommend repeat colonoscopy in 5 years. If the polyp is adenomatous, would recommend all first-degree relatives over age 40 be in screening as well. Pathology returned serrated adenoma.   • COLONOSCOPY  01/14/2016 01/14/2016--normal colonoscopy.  Recommended repeat study in 10 years.   • LAPAROSCOPIC CHOLECYSTECTOMY  04/03/2012 04/03/2012--laparoscopic cholecystectomy. Pathology returned cholesterolosis.   • SHOULDER ARTHROSCOPY DISTAL CLAVICLE EXCISION AND OPEN ROTATOR CUFF REPAIR  10/23/2019    October 23, 2019--right shoulder arthroscopy with rotator  cuff repair, subacromial decompression, and extensive debridement.  Open distal clavicle excision and open subpectoral biceps tenodesis.   • TONSILLECTOMY  Childhood    Childhood tonsillectomy         Allergies   Allergen Reactions   • Cortisone Other (See Comments)     Locks up patient jjoint           Current Outpatient Medications:   •  aspirin 81 MG tablet, Take 1 tablet by mouth Daily., Disp: , Rfl:   •  Cholecalciferol (VITAMIN D3) 5000 UNITS capsule capsule, Take 1 capsule by mouth daily., Disp: , Rfl:   •  ezetimibe (ZETIA) 10 MG tablet, Take 1 p.o. daily for high cholesterol, Disp: 90 tablet, Rfl: 3  •  fluticasone (FLONASE) 50 MCG/ACT nasal spray, 2 sprays by Each Nare route Daily., Disp: 16 g, Rfl: 8  •  levothyroxine (SYNTHROID) 150 MCG tablet, One by mouth daily for low thyroid, Disp: 90 tablet, Rfl: 3  •  prednisoLONE acetate (PRED FORTE) 1 % ophthalmic suspension, , Disp: , Rfl:   •  simvastatin (ZOCOR) 40 MG tablet, 1 p.o. daily for high cholesterol, Disp: 90 tablet, Rfl: 3  •  Testosterone (AndroGel Pump) 20.25 MG/ACT (1.62%) gel, Apply a total of 4 pumps daily as directed, Disp: 450 g, Rfl: 1  •  cyclobenzaprine (FLEXERIL) 10 MG tablet, Take 1 tablet by mouth 3 (Three) Times a Day As Needed for Muscle Spasms., Disp: 30 tablet, Rfl: 0  •  naproxen (Naprosyn) 500 MG tablet, Take 1 p.o. twice daily for back pain/arthritis, Disp: 60 tablet, Rfl: 1  •  tamsulosin (FLOMAX) 0.4 MG capsule 24 hr capsule, Take 1 p.o. nightly for prostate symptoms, Disp: 30 capsule, Rfl: 2      Family History   Problem Relation Age of Onset   • Cancer Father         Liver Cancer. Father  from liver cancer.   • Heart attack Brother         Acute Myocardial Infarction.  Brother had a myocardial infarction at age 68.         Social History     Socioeconomic History   • Marital status:      Spouse name: Not on file   • Number of children: 2   • Years of education: Not on file   • Highest education level: 12th grade  "  Occupational History   • Occupation: Retired - [employer]   Social Needs   • Financial resource strain: Not hard at all   • Food insecurity     Worry: Never true     Inability: Never true   • Transportation needs     Medical: No     Non-medical: No   Tobacco Use   • Smoking status: Former Smoker   • Smokeless tobacco: Never Used   • Tobacco comment: Stopped smoking at age 35.   Substance and Sexual Activity   • Alcohol use: Yes     Frequency: Monthly or less     Drinks per session: 1 or 2     Comment: Moderately   • Drug use: No   • Sexual activity: Yes     Partners: Female   Lifestyle   • Physical activity     Days per week: 7 days     Minutes per session: 120 min   • Stress: Not at all   Relationships   • Social connections     Talks on phone: More than three times a week     Gets together: Three times a week     Attends Nondenominational service: More than 4 times per year     Active member of club or organization: No     Attends meetings of clubs or organizations: Never     Relationship status:          Vitals:    11/09/20 1339   BP: 124/70   BP Location: Left arm   Pulse: 71   SpO2: 98%   Weight: 104 kg (230 lb)   Height: 172 cm (67.72\")        Body mass index is 35.26 kg/m².      Physical Exam:    General: Alert and oriented x 3.  No acute distress.  Normal affect.  Morbidly obese.  HEENT: Pupils equal, round, reactive to light; extraocular movements intact; sclerae nonicteric; pharynx, ear canals and TMs normal.  Neck: Without JVD, thyromegaly, bruit, or adenopathy.  Lungs: Clear to auscultation in all fields.  Heart: Regular rate and rhythm without murmur, rub, gallop, or click.  Abdomen: Soft, nontender, without hepatosplenomegaly or hernia.  Bowel sounds normal.  : Deferred.  Rectal: Deferred.  Extremities: Without clubbing, cyanosis, edema, or pulse deficit.  Neurologic: Intact without focal deficit.  Normal station and gait observed during ingress and egress from the examination room.  Skin: Without.  " Lesion.  Musculoskeletal: There is perhaps some tenderness to percussion over the lumbar paraspinous muscle/right flank but nothing significant.    Lab/other results:      Assessment/Plan:     Diagnosis Plan   1. Right flank pain  CT Abdomen Pelvis Stone Protocol    naproxen (Naprosyn) 500 MG tablet    cyclobenzaprine (FLEXERIL) 10 MG tablet   2. Urinary frequency  POCT urinalysis dipstick, automated    CT Abdomen Pelvis Stone Protocol    tamsulosin (FLOMAX) 0.4 MG capsule 24 hr capsule    PSA DIAGNOSTIC   3. Urinary urgency  POCT urinalysis dipstick, automated    CT Abdomen Pelvis Stone Protocol    tamsulosin (FLOMAX) 0.4 MG capsule 24 hr capsule    PSA DIAGNOSTIC   4. Need for influenza vaccination  Fluad Quad 65+ yrs ([id number])   5. Benign non-nodular prostatic hyperplasia with lower urinary tract symptoms  tamsulosin (FLOMAX) 0.4 MG capsule 24 hr capsule    PSA DIAGNOSTIC     November 9, 2020--patient presents with approximately 3-week history of intermittent episodes of right flank pain that at times is very sharp and stabbing.  It does seem to be positionally related such as bending over or standing or sitting in his  at times.  He also has some pain across his low back as well.  He is not noticed any blood in his urine and has had no nausea or vomiting.  He has noticed intermittent urination with hesitancy however.  He has had no fever or chills.  Also is not noticed a rash on his right flank.  Physical exam is not particularly remarkable.  There is perhaps some tenderness to percussion over the right lower back/flank.  Automated urinalysis was negative.  Stat CT scan of the abdomen pelvis with renal stone protocol ordered and revealed no evidence of stone and no intra abdominal pathology.  It appears that patient's pain is likely musculoskeletal although it is a little atypical for that.  Physical therapy recommended but patient is not quite ready for that.  Plan is to treat with naproxen 500  mg p.o. twice daily along with Flexeril 1 p.o. 3 times daily as needed muscle relaxer.  Patient instructed to contact me if his symptoms persist and certainly if they change significantly such as for the worse.    Plan is as follows: High-dose influenza vaccine given.  Naproxen 500 mg p.o. twice daily as needed.  Flexeril 10 mg p.o. 3 times daily as needed.  Patient will contact me if his symptoms persist and certainly if they change in any significant way.    Addendum: After further consideration, patient does have significant BPH obstructive symptoms and I treated him for an acute prostatitis sometime back which resulted in dramatic improvement in his symptoms.  I will therefore start him back on Flomax 0.4 mg nightly and order a PSA.  Patient will follow-up on the phone for the results of the PSA and possible further instructions including antibiotic therapy.    Procedures        Answers for HPI/ROS submitted by the patient on 11/3/2020   Back pain  What is the primary reason for your visit?: Back Pain  Chronicity: new  Onset: 1 to 4 weeks ago  Frequency: daily  Progression since onset: waxing and waning  Pain location: lumbar spine  Pain quality: aching, shooting  Radiates to: does not radiate  Pain - numeric: 8/10  Pain is: worse during the day  Aggravated by: standing, twisting  Stiffness is present: in the morning  leg pain: Yes  paresis: No  perianal numbness: No  tingling: No

## 2020-11-10 NOTE — TELEPHONE ENCOUNTER
----- Message from Marco Kenney MD sent at 11/10/2020  9:29 AM EST -----  Tell patient his PSA is fine and it does not appear he has a prostate infection.    Pt informed

## 2020-12-08 NOTE — TELEPHONE ENCOUNTER
PATIENT CALLED IN AND STATED THAT HE IS OUT OF   simvastatin (ZOCOR) 40 MG tablet  ezetimibe (ZETIA) 10 MG tablet      HE NEEDS THIS CALLED IN TODAY [facility] AS SOON AS POSSIBLE.     CALL BACK [phone]    PLEASE ADVISE    SEE PREVIOUS ENCOUNTERS

## 2021-01-01 ENCOUNTER — EPISODE CHANGES (OUTPATIENT)
Dept: CASE MANAGEMENT | Facility: OTHER | Age: 71
End: 2021-01-01

## 2021-01-01 ENCOUNTER — OFFICE VISIT (OUTPATIENT)
Dept: INTERNAL MEDICINE | Facility: CLINIC | Age: 71
End: 2021-01-01

## 2021-01-01 ENCOUNTER — APPOINTMENT (OUTPATIENT)
Dept: CARDIOLOGY | Facility: HOSPITAL | Age: 71
End: 2021-01-01

## 2021-01-01 ENCOUNTER — PREP FOR SURGERY (OUTPATIENT)
Dept: OTHER | Facility: HOSPITAL | Age: 71
End: 2021-01-01

## 2021-01-01 ENCOUNTER — TELEPHONE (OUTPATIENT)
Dept: INTERNAL MEDICINE | Facility: CLINIC | Age: 71
End: 2021-01-01

## 2021-01-01 ENCOUNTER — APPOINTMENT (OUTPATIENT)
Dept: GENERAL RADIOLOGY | Facility: HOSPITAL | Age: 71
End: 2021-01-01

## 2021-01-01 ENCOUNTER — HOSPITAL ENCOUNTER (EMERGENCY)
Facility: HOSPITAL | Age: 71
Discharge: HOME | End: 2021-01-02
Attending: EMERGENCY MEDICINE | Admitting: EMERGENCY MEDICINE

## 2021-01-01 ENCOUNTER — APPOINTMENT (OUTPATIENT)
Dept: CT IMAGING | Facility: HOSPITAL | Age: 71
End: 2021-01-01

## 2021-01-01 ENCOUNTER — PATIENT OUTREACH (OUTPATIENT)
Dept: CASE MANAGEMENT | Facility: OTHER | Age: 71
End: 2021-01-01

## 2021-01-01 ENCOUNTER — LAB (OUTPATIENT)
Dept: LAB | Facility: HOSPITAL | Age: 71
End: 2021-01-01

## 2021-01-01 ENCOUNTER — HOSPITAL ENCOUNTER (OUTPATIENT)
Dept: INFUSION THERAPY | Facility: HOSPITAL | Age: 71
Discharge: HOME | End: 2021-08-20
Admitting: INTERNAL MEDICINE

## 2021-01-01 ENCOUNTER — HOSPITAL ENCOUNTER (INPATIENT)
Facility: HOSPITAL | Age: 71
LOS: 14 days | End: 2021-09-05
Attending: EMERGENCY MEDICINE

## 2021-01-01 ENCOUNTER — HOSPITAL ENCOUNTER (OUTPATIENT)
Dept: GENERAL RADIOLOGY | Facility: HOSPITAL | Age: 71
Discharge: HOME | End: 2021-08-18
Admitting: INTERNAL MEDICINE

## 2021-01-01 ENCOUNTER — HOSPITAL ENCOUNTER (EMERGENCY)
Facility: HOSPITAL | Age: 71
Discharge: LEFT BEFORE DISCHARGE | End: 2021-08-20

## 2021-01-01 ENCOUNTER — TRANSCRIBE ORDERS (OUTPATIENT)
Dept: ADMINISTRATIVE | Facility: HOSPITAL | Age: 71
End: 2021-01-01

## 2021-01-01 ENCOUNTER — APPOINTMENT (OUTPATIENT)
Dept: INFUSION THERAPY | Facility: HOSPITAL | Age: 71
End: 2021-01-01

## 2021-01-01 VITALS
OXYGEN SATURATION: 96 % | BODY MASS INDEX: 33.19 KG/M2 | HEART RATE: 74 BPM | TEMPERATURE: 98.2 F | HEIGHT: 68 IN | DIASTOLIC BLOOD PRESSURE: 82 MMHG | WEIGHT: 219 LBS | RESPIRATION RATE: 17 BRPM | SYSTOLIC BLOOD PRESSURE: 135 MMHG

## 2021-01-01 VITALS
RESPIRATION RATE: 16 BRPM | HEIGHT: 68 IN | TEMPERATURE: 98.4 F | HEART RATE: 93 BPM | DIASTOLIC BLOOD PRESSURE: 72 MMHG | SYSTOLIC BLOOD PRESSURE: 120 MMHG | BODY MASS INDEX: 34.25 KG/M2 | OXYGEN SATURATION: 96 % | WEIGHT: 226 LBS

## 2021-01-01 VITALS
TEMPERATURE: 100.4 F | HEIGHT: 68 IN | BODY MASS INDEX: 34.13 KG/M2 | WEIGHT: 225.2 LBS | HEART RATE: 98 BPM | OXYGEN SATURATION: 98 % | SYSTOLIC BLOOD PRESSURE: 120 MMHG | DIASTOLIC BLOOD PRESSURE: 70 MMHG | RESPIRATION RATE: 15 BRPM

## 2021-01-01 VITALS
BODY MASS INDEX: 34.71 KG/M2 | HEIGHT: 68 IN | DIASTOLIC BLOOD PRESSURE: 74 MMHG | WEIGHT: 229 LBS | HEART RATE: 84 BPM | SYSTOLIC BLOOD PRESSURE: 126 MMHG | OXYGEN SATURATION: 98 %

## 2021-01-01 VITALS
SYSTOLIC BLOOD PRESSURE: 99 MMHG | TEMPERATURE: 102.7 F | RESPIRATION RATE: 16 BRPM | DIASTOLIC BLOOD PRESSURE: 76 MMHG | OXYGEN SATURATION: 93 % | HEART RATE: 89 BPM

## 2021-01-01 VITALS
SYSTOLIC BLOOD PRESSURE: 98 MMHG | TEMPERATURE: 98.1 F | RESPIRATION RATE: 34 BRPM | OXYGEN SATURATION: 63 % | BODY MASS INDEX: 29.47 KG/M2 | WEIGHT: 194.45 LBS | HEART RATE: 124 BPM | HEIGHT: 68 IN | DIASTOLIC BLOOD PRESSURE: 63 MMHG

## 2021-01-01 VITALS
DIASTOLIC BLOOD PRESSURE: 56 MMHG | OXYGEN SATURATION: 91 % | SYSTOLIC BLOOD PRESSURE: 106 MMHG | TEMPERATURE: 98.6 F | RESPIRATION RATE: 22 BRPM | HEART RATE: 91 BPM

## 2021-01-01 DIAGNOSIS — E03.9 PRIMARY HYPOTHYROIDISM: Chronic | ICD-10-CM

## 2021-01-01 DIAGNOSIS — E29.1 HYPOGONADISM MALE: Chronic | ICD-10-CM

## 2021-01-01 DIAGNOSIS — U07.1 COVID-19 VIRUS INFECTION: ICD-10-CM

## 2021-01-01 DIAGNOSIS — Z86.010 HISTORY OF COLON POLYPS: Primary | ICD-10-CM

## 2021-01-01 DIAGNOSIS — N40.1 BENIGN NON-NODULAR PROSTATIC HYPERPLASIA WITH LOWER URINARY TRACT SYMPTOMS: ICD-10-CM

## 2021-01-01 DIAGNOSIS — E03.9 HYPOTHYROIDISM, UNSPECIFIED TYPE: Chronic | ICD-10-CM

## 2021-01-01 DIAGNOSIS — R55 NEAR SYNCOPE: ICD-10-CM

## 2021-01-01 DIAGNOSIS — R50.9 FEBRILE ILLNESS, ACUTE: Primary | ICD-10-CM

## 2021-01-01 DIAGNOSIS — Z91.09 MULTIPLE ENVIRONMENTAL ALLERGIES: Chronic | ICD-10-CM

## 2021-01-01 DIAGNOSIS — Z86.010 HISTORY OF COLON POLYPS: Chronic | ICD-10-CM

## 2021-01-01 DIAGNOSIS — E55.9 VITAMIN D DEFICIENCY: Chronic | ICD-10-CM

## 2021-01-01 DIAGNOSIS — E78.2 MIXED HYPERLIPIDEMIA: Chronic | ICD-10-CM

## 2021-01-01 DIAGNOSIS — R39.15 URINARY URGENCY: ICD-10-CM

## 2021-01-01 DIAGNOSIS — Z51.81 THERAPEUTIC DRUG MONITORING: ICD-10-CM

## 2021-01-01 DIAGNOSIS — J96.01 ACUTE HYPOXEMIC RESPIRATORY FAILURE (HCC): Primary | ICD-10-CM

## 2021-01-01 DIAGNOSIS — Z23 IMMUNIZATION DUE: ICD-10-CM

## 2021-01-01 DIAGNOSIS — U07.1 COVID-19: Primary | ICD-10-CM

## 2021-01-01 DIAGNOSIS — R35.0 URINARY FREQUENCY: ICD-10-CM

## 2021-01-01 DIAGNOSIS — N40.1 BENIGN NON-NODULAR PROSTATIC HYPERPLASIA WITH LOWER URINARY TRACT SYMPTOMS: Chronic | ICD-10-CM

## 2021-01-01 DIAGNOSIS — S70.11XA CONTUSION OF RIGHT THIGH, INITIAL ENCOUNTER: Primary | ICD-10-CM

## 2021-01-01 DIAGNOSIS — U07.1 CLINICAL DIAGNOSIS OF SEVERE ACUTE RESPIRATORY SYNDROME CORONAVIRUS 2 (SARS-COV-2) DISEASE: Primary | ICD-10-CM

## 2021-01-01 DIAGNOSIS — R73.01 IMPAIRED FASTING GLUCOSE: Chronic | ICD-10-CM

## 2021-01-01 DIAGNOSIS — R73.01 IMPAIRED FASTING GLUCOSE: Primary | Chronic | ICD-10-CM

## 2021-01-01 DIAGNOSIS — J18.9 PNEUMONIA OF BOTH LOWER LOBES DUE TO INFECTIOUS ORGANISM: ICD-10-CM

## 2021-01-01 DIAGNOSIS — J18.9 MULTIFOCAL PNEUMONIA: ICD-10-CM

## 2021-01-01 DIAGNOSIS — Z12.11 COLON CANCER SCREENING: ICD-10-CM

## 2021-01-01 DIAGNOSIS — N17.9 AKI (ACUTE KIDNEY INJURY) (HCC): ICD-10-CM

## 2021-01-01 DIAGNOSIS — G47.33 OBSTRUCTIVE SLEEP APNEA: Chronic | ICD-10-CM

## 2021-01-01 DIAGNOSIS — N52.9 MALE ERECTILE DISORDER: Primary | Chronic | ICD-10-CM

## 2021-01-01 DIAGNOSIS — E66.01 MORBIDLY OBESE (HCC): Chronic | ICD-10-CM

## 2021-01-01 DIAGNOSIS — F39 MOOD DISORDER (HCC): Chronic | ICD-10-CM

## 2021-01-01 DIAGNOSIS — K21.9 GASTROESOPHAGEAL REFLUX DISEASE WITHOUT ESOPHAGITIS: Chronic | ICD-10-CM

## 2021-01-01 LAB
25(OH)D3 SERPL-MCNC: 40.8 NG/ML (ref 30–100)
ALBUMIN SERPL-MCNC: 2.2 G/DL (ref 3.5–5.2)
ALBUMIN SERPL-MCNC: 2.9 G/DL (ref 3.5–5.2)
ALBUMIN SERPL-MCNC: 2.9 G/DL (ref 3.5–5.2)
ALBUMIN SERPL-MCNC: 3 G/DL (ref 3.5–5.2)
ALBUMIN SERPL-MCNC: 3 G/DL (ref 3.5–5.2)
ALBUMIN SERPL-MCNC: 3.1 G/DL (ref 3.5–5.2)
ALBUMIN SERPL-MCNC: 3.2 G/DL (ref 3.5–5.2)
ALBUMIN SERPL-MCNC: 3.2 G/DL (ref 3.5–5.2)
ALBUMIN SERPL-MCNC: 3.4 G/DL (ref 3.5–5.2)
ALBUMIN SERPL-MCNC: 3.5 G/DL (ref 3.5–5.2)
ALBUMIN SERPL-MCNC: 3.8 G/DL (ref 3.5–5.2)
ALBUMIN SERPL-MCNC: 4.1 G/DL (ref 3.5–5.2)
ALBUMIN SERPL-MCNC: 4.1 G/DL (ref 3.5–5.2)
ALBUMIN SERPL-MCNC: 4.4 G/DL (ref 3.5–5.2)
ALBUMIN UR-MCNC: 4.2 MG/DL
ALBUMIN/GLOB SERPL: 0.8 G/DL
ALBUMIN/GLOB SERPL: 1 G/DL
ALBUMIN/GLOB SERPL: 1.2 G/DL
ALBUMIN/GLOB SERPL: 1.3 G/DL
ALBUMIN/GLOB SERPL: 1.3 G/DL
ALBUMIN/GLOB SERPL: 1.4 G/DL
ALBUMIN/GLOB SERPL: 1.5 G/DL
ALBUMIN/GLOB SERPL: 1.6 G/DL
ALBUMIN/GLOB SERPL: 1.7 G/DL
ALBUMIN/GLOB SERPL: 1.8 G/DL
ALBUMIN/GLOB SERPL: 1.8 G/DL
ALBUMIN/GLOB SERPL: 2.3 G/DL
ALP SERPL-CCNC: 106 U/L (ref 39–117)
ALP SERPL-CCNC: 112 U/L (ref 39–117)
ALP SERPL-CCNC: 113 U/L (ref 39–117)
ALP SERPL-CCNC: 119 U/L (ref 39–117)
ALP SERPL-CCNC: 47 U/L (ref 39–117)
ALP SERPL-CCNC: 48 U/L (ref 39–117)
ALP SERPL-CCNC: 52 U/L (ref 39–117)
ALP SERPL-CCNC: 54 U/L (ref 39–117)
ALP SERPL-CCNC: 65 U/L (ref 39–117)
ALP SERPL-CCNC: 69 U/L (ref 39–117)
ALP SERPL-CCNC: 70 U/L (ref 39–117)
ALP SERPL-CCNC: 74 U/L (ref 39–117)
ALP SERPL-CCNC: 82 U/L (ref 39–117)
ALP SERPL-CCNC: 86 U/L (ref 39–117)
ALP SERPL-CCNC: 91 U/L (ref 39–117)
ALP SERPL-CCNC: 96 U/L (ref 39–117)
ALP SERPL-CCNC: 98 U/L (ref 39–117)
ALP SERPL-CCNC: 98 U/L (ref 39–117)
ALT SERPL W P-5'-P-CCNC: 103 U/L (ref 1–41)
ALT SERPL W P-5'-P-CCNC: 110 U/L (ref 1–41)
ALT SERPL W P-5'-P-CCNC: 111 U/L (ref 1–41)
ALT SERPL W P-5'-P-CCNC: 131 U/L (ref 1–41)
ALT SERPL W P-5'-P-CCNC: 160 U/L (ref 1–41)
ALT SERPL W P-5'-P-CCNC: 22 U/L (ref 1–41)
ALT SERPL W P-5'-P-CCNC: 26 U/L (ref 1–41)
ALT SERPL W P-5'-P-CCNC: 34 U/L (ref 1–41)
ALT SERPL W P-5'-P-CCNC: 35 U/L (ref 1–41)
ALT SERPL W P-5'-P-CCNC: 37 U/L (ref 1–41)
ALT SERPL W P-5'-P-CCNC: 45 U/L (ref 1–41)
ALT SERPL W P-5'-P-CCNC: 53 U/L (ref 1–41)
ALT SERPL W P-5'-P-CCNC: 59 U/L (ref 1–41)
ALT SERPL W P-5'-P-CCNC: 59 U/L (ref 1–41)
ALT SERPL W P-5'-P-CCNC: 64 U/L (ref 1–41)
ALT SERPL W P-5'-P-CCNC: 72 U/L (ref 1–41)
ALT SERPL W P-5'-P-CCNC: 77 U/L (ref 1–41)
ALT SERPL W P-5'-P-CCNC: 90 U/L (ref 1–41)
ANION GAP SERPL CALCULATED.3IONS-SCNC: 10.4 MMOL/L (ref 5–15)
ANION GAP SERPL CALCULATED.3IONS-SCNC: 10.5 MMOL/L (ref 5–15)
ANION GAP SERPL CALCULATED.3IONS-SCNC: 11 MMOL/L (ref 5–15)
ANION GAP SERPL CALCULATED.3IONS-SCNC: 11.1 MMOL/L (ref 5–15)
ANION GAP SERPL CALCULATED.3IONS-SCNC: 11.4 MMOL/L (ref 5–15)
ANION GAP SERPL CALCULATED.3IONS-SCNC: 11.5 MMOL/L (ref 5–15)
ANION GAP SERPL CALCULATED.3IONS-SCNC: 11.7 MMOL/L (ref 5–15)
ANION GAP SERPL CALCULATED.3IONS-SCNC: 12 MMOL/L (ref 5–15)
ANION GAP SERPL CALCULATED.3IONS-SCNC: 12.1 MMOL/L (ref 5–15)
ANION GAP SERPL CALCULATED.3IONS-SCNC: 12.3 MMOL/L (ref 5–15)
ANION GAP SERPL CALCULATED.3IONS-SCNC: 12.8 MMOL/L (ref 5–15)
ANION GAP SERPL CALCULATED.3IONS-SCNC: 7.1 MMOL/L (ref 5–15)
ANION GAP SERPL CALCULATED.3IONS-SCNC: 8.3 MMOL/L (ref 5–15)
ANION GAP SERPL CALCULATED.3IONS-SCNC: 9.6 MMOL/L (ref 5–15)
ARTERIAL PATENCY WRIST A: ABNORMAL
ARTERIAL PATENCY WRIST A: POSITIVE
AST SERPL-CCNC: 17 U/L (ref 1–40)
AST SERPL-CCNC: 23 U/L (ref 1–40)
AST SERPL-CCNC: 34 U/L (ref 1–40)
AST SERPL-CCNC: 48 U/L (ref 1–40)
AST SERPL-CCNC: 53 U/L (ref 1–40)
AST SERPL-CCNC: 53 U/L (ref 1–40)
AST SERPL-CCNC: 57 U/L (ref 1–40)
AST SERPL-CCNC: 60 U/L (ref 1–40)
AST SERPL-CCNC: 60 U/L (ref 1–40)
AST SERPL-CCNC: 65 U/L (ref 1–40)
AST SERPL-CCNC: 66 U/L (ref 1–40)
AST SERPL-CCNC: 68 U/L (ref 1–40)
AST SERPL-CCNC: 69 U/L (ref 1–40)
AST SERPL-CCNC: 70 U/L (ref 1–40)
AST SERPL-CCNC: 71 U/L (ref 1–40)
AST SERPL-CCNC: 72 U/L (ref 1–40)
AST SERPL-CCNC: 72 U/L (ref 1–40)
AST SERPL-CCNC: 73 U/L (ref 1–40)
ATMOSPHERIC PRESS: 748.3 MMHG
ATMOSPHERIC PRESS: 748.6 MMHG
ATMOSPHERIC PRESS: 748.9 MMHG
ATMOSPHERIC PRESS: 749.8 MMHG
ATMOSPHERIC PRESS: 750.1 MMHG
ATMOSPHERIC PRESS: 750.7 MMHG
ATMOSPHERIC PRESS: 751.1 MMHG
ATMOSPHERIC PRESS: 752.3 MMHG
B PARAPERT DNA SPEC QL NAA+PROBE: NOT DETECTED
B PERT DNA SPEC QL NAA+PROBE: NOT DETECTED
BACTERIA SPEC AEROBE CULT: NORMAL
BACTERIA UR QL AUTO: ABNORMAL /HPF
BACTERIA UR QL AUTO: ABNORMAL /HPF
BASE EXCESS BLDA CALC-SCNC: -2 MMOL/L (ref 0–2)
BASE EXCESS BLDA CALC-SCNC: -2.2 MMOL/L (ref 0–2)
BASE EXCESS BLDA CALC-SCNC: -2.5 MMOL/L (ref 0–2)
BASE EXCESS BLDA CALC-SCNC: -3.8 MMOL/L (ref 0–2)
BASE EXCESS BLDA CALC-SCNC: -4.2 MMOL/L (ref 0–2)
BASE EXCESS BLDA CALC-SCNC: -5.1 MMOL/L (ref 0–2)
BASE EXCESS BLDA CALC-SCNC: -5.4 MMOL/L (ref 0–2)
BASE EXCESS BLDA CALC-SCNC: -5.5 MMOL/L (ref 0–2)
BASOPHILS # BLD AUTO: 0 10*3/MM3 (ref 0–0.2)
BASOPHILS # BLD AUTO: 0.01 10*3/MM3 (ref 0–0.2)
BASOPHILS # BLD AUTO: 0.01 10*3/MM3 (ref 0–0.2)
BASOPHILS # BLD AUTO: 0.02 10*3/MM3 (ref 0–0.2)
BASOPHILS # BLD AUTO: 0.03 10*3/MM3 (ref 0–0.2)
BASOPHILS # BLD AUTO: 0.04 10*3/MM3 (ref 0–0.2)
BASOPHILS # BLD AUTO: 0.04 10*3/MM3 (ref 0–0.2)
BASOPHILS NFR BLD AUTO: 0 % (ref 0–1.5)
BASOPHILS NFR BLD AUTO: 0.1 % (ref 0–1.5)
BASOPHILS NFR BLD AUTO: 0.2 % (ref 0–1.5)
BASOPHILS NFR BLD AUTO: 0.3 % (ref 0–1.5)
BASOPHILS NFR BLD AUTO: 0.4 % (ref 0–1.5)
BASOPHILS NFR BLD AUTO: 0.4 % (ref 0–1.5)
BDY SITE: ABNORMAL
BH CV LOWER VASCULAR LEFT COMMON FEMORAL AUGMENT: NORMAL
BH CV LOWER VASCULAR LEFT COMMON FEMORAL COMPETENT: NORMAL
BH CV LOWER VASCULAR LEFT COMMON FEMORAL COMPRESS: NORMAL
BH CV LOWER VASCULAR LEFT COMMON FEMORAL PHASIC: NORMAL
BH CV LOWER VASCULAR LEFT COMMON FEMORAL SPONT: NORMAL
BH CV LOWER VASCULAR LEFT DISTAL FEMORAL COMPRESS: NORMAL
BH CV LOWER VASCULAR LEFT GASTRONEMIUS COMPRESS: NORMAL
BH CV LOWER VASCULAR LEFT GREATER SAPH AK COMPRESS: NORMAL
BH CV LOWER VASCULAR LEFT GREATER SAPH BK COMPRESS: NORMAL
BH CV LOWER VASCULAR LEFT LESSER SAPH COMPRESS: NORMAL
BH CV LOWER VASCULAR LEFT MID FEMORAL AUGMENT: NORMAL
BH CV LOWER VASCULAR LEFT MID FEMORAL COMPETENT: NORMAL
BH CV LOWER VASCULAR LEFT MID FEMORAL COMPRESS: NORMAL
BH CV LOWER VASCULAR LEFT MID FEMORAL PHASIC: NORMAL
BH CV LOWER VASCULAR LEFT MID FEMORAL SPONT: NORMAL
BH CV LOWER VASCULAR LEFT PERONEAL COMPRESS: NORMAL
BH CV LOWER VASCULAR LEFT POPLITEAL AUGMENT: NORMAL
BH CV LOWER VASCULAR LEFT POPLITEAL COMPETENT: NORMAL
BH CV LOWER VASCULAR LEFT POPLITEAL COMPRESS: NORMAL
BH CV LOWER VASCULAR LEFT POPLITEAL PHASIC: NORMAL
BH CV LOWER VASCULAR LEFT POPLITEAL SPONT: NORMAL
BH CV LOWER VASCULAR LEFT POSTERIOR TIBIAL COMPRESS: NORMAL
BH CV LOWER VASCULAR LEFT PROFUNDA FEMORAL COMPRESS: NORMAL
BH CV LOWER VASCULAR LEFT PROXIMAL FEMORAL COMPRESS: NORMAL
BH CV LOWER VASCULAR LEFT SAPHENOFEMORAL JUNCTION COMPRESS: NORMAL
BH CV LOWER VASCULAR RIGHT COMMON FEMORAL AUGMENT: NORMAL
BH CV LOWER VASCULAR RIGHT COMMON FEMORAL COMPETENT: NORMAL
BH CV LOWER VASCULAR RIGHT COMMON FEMORAL COMPRESS: NORMAL
BH CV LOWER VASCULAR RIGHT COMMON FEMORAL PHASIC: NORMAL
BH CV LOWER VASCULAR RIGHT COMMON FEMORAL SPONT: NORMAL
BH CV LOWER VASCULAR RIGHT DISTAL FEMORAL COMPRESS: NORMAL
BH CV LOWER VASCULAR RIGHT GASTRONEMIUS COMPRESS: NORMAL
BH CV LOWER VASCULAR RIGHT GREATER SAPH AK COMPRESS: NORMAL
BH CV LOWER VASCULAR RIGHT GREATER SAPH BK COMPRESS: NORMAL
BH CV LOWER VASCULAR RIGHT MID FEMORAL AUGMENT: NORMAL
BH CV LOWER VASCULAR RIGHT MID FEMORAL COMPETENT: NORMAL
BH CV LOWER VASCULAR RIGHT MID FEMORAL COMPRESS: NORMAL
BH CV LOWER VASCULAR RIGHT MID FEMORAL PHASIC: NORMAL
BH CV LOWER VASCULAR RIGHT MID FEMORAL SPONT: NORMAL
BH CV LOWER VASCULAR RIGHT PERONEAL COMPRESS: NORMAL
BH CV LOWER VASCULAR RIGHT POPLITEAL AUGMENT: NORMAL
BH CV LOWER VASCULAR RIGHT POPLITEAL COMPETENT: NORMAL
BH CV LOWER VASCULAR RIGHT POPLITEAL COMPRESS: NORMAL
BH CV LOWER VASCULAR RIGHT POPLITEAL PHASIC: NORMAL
BH CV LOWER VASCULAR RIGHT POPLITEAL SPONT: NORMAL
BH CV LOWER VASCULAR RIGHT POSTERIOR TIBIAL COMPRESS: NORMAL
BH CV LOWER VASCULAR RIGHT PROFUNDA FEMORAL COMPRESS: NORMAL
BH CV LOWER VASCULAR RIGHT PROXIMAL FEMORAL COMPRESS: NORMAL
BH CV LOWER VASCULAR RIGHT SAPHENOFEMORAL JUNCTION COMPRESS: NORMAL
BILIRUB CONJ SERPL-MCNC: 0.2 MG/DL (ref 0–0.3)
BILIRUB CONJ SERPL-MCNC: 0.4 MG/DL (ref 0–0.3)
BILIRUB SERPL-MCNC: 0.6 MG/DL (ref 0–1.2)
BILIRUB SERPL-MCNC: 0.6 MG/DL (ref 0–1.2)
BILIRUB SERPL-MCNC: 0.7 MG/DL (ref 0–1.2)
BILIRUB SERPL-MCNC: 0.7 MG/DL (ref 0–1.2)
BILIRUB SERPL-MCNC: 0.8 MG/DL (ref 0–1.2)
BILIRUB SERPL-MCNC: 0.9 MG/DL (ref 0–1.2)
BILIRUB SERPL-MCNC: 1.2 MG/DL (ref 0–1.2)
BILIRUB SERPL-MCNC: 1.3 MG/DL (ref 0–1.2)
BILIRUB SERPL-MCNC: 1.3 MG/DL (ref 0–1.2)
BILIRUB SERPL-MCNC: 1.4 MG/DL (ref 0–1.2)
BILIRUB SERPL-MCNC: 1.6 MG/DL (ref 0–1.2)
BILIRUB UR QL STRIP: NEGATIVE
BUN SERPL-MCNC: 12 MG/DL (ref 8–23)
BUN SERPL-MCNC: 16 MG/DL (ref 8–23)
BUN SERPL-MCNC: 19 MG/DL (ref 8–23)
BUN SERPL-MCNC: 21 MG/DL (ref 8–23)
BUN SERPL-MCNC: 22 MG/DL (ref 8–23)
BUN SERPL-MCNC: 22 MG/DL (ref 8–23)
BUN SERPL-MCNC: 23 MG/DL (ref 8–23)
BUN SERPL-MCNC: 23 MG/DL (ref 8–23)
BUN SERPL-MCNC: 24 MG/DL (ref 8–23)
BUN SERPL-MCNC: 24 MG/DL (ref 8–23)
BUN SERPL-MCNC: 25 MG/DL (ref 8–23)
BUN SERPL-MCNC: 28 MG/DL (ref 8–23)
BUN SERPL-MCNC: 28 MG/DL (ref 8–23)
BUN SERPL-MCNC: 29 MG/DL (ref 8–23)
BUN SERPL-MCNC: 29 MG/DL (ref 8–23)
BUN SERPL-MCNC: 46 MG/DL (ref 8–23)
BUN/CREAT SERPL: 12.1 (ref 7–25)
BUN/CREAT SERPL: 13.3 (ref 7–25)
BUN/CREAT SERPL: 14.8 (ref 7–25)
BUN/CREAT SERPL: 14.9 (ref 7–25)
BUN/CREAT SERPL: 16.7 (ref 7–25)
BUN/CREAT SERPL: 17.2 (ref 7–25)
BUN/CREAT SERPL: 17.8 (ref 7–25)
BUN/CREAT SERPL: 19.1 (ref 7–25)
BUN/CREAT SERPL: 19.8 (ref 7–25)
BUN/CREAT SERPL: 20 (ref 7–25)
BUN/CREAT SERPL: 22.9 (ref 7–25)
BUN/CREAT SERPL: 23.4 (ref 7–25)
BUN/CREAT SERPL: 23.7 (ref 7–25)
BUN/CREAT SERPL: 25 (ref 7–25)
BUN/CREAT SERPL: 27.1 (ref 7–25)
BUN/CREAT SERPL: 27.4 (ref 7–25)
BUN/CREAT SERPL: 27.7 (ref 7–25)
BUN/CREAT SERPL: 29.8 (ref 7–25)
C PNEUM DNA NPH QL NAA+NON-PROBE: NOT DETECTED
CALCIUM SPEC-SCNC: 6.4 MG/DL (ref 8.6–10.5)
CALCIUM SPEC-SCNC: 7.4 MG/DL (ref 8.6–10.5)
CALCIUM SPEC-SCNC: 7.5 MG/DL (ref 8.6–10.5)
CALCIUM SPEC-SCNC: 7.6 MG/DL (ref 8.6–10.5)
CALCIUM SPEC-SCNC: 7.7 MG/DL (ref 8.6–10.5)
CALCIUM SPEC-SCNC: 7.7 MG/DL (ref 8.6–10.5)
CALCIUM SPEC-SCNC: 7.9 MG/DL (ref 8.6–10.5)
CALCIUM SPEC-SCNC: 7.9 MG/DL (ref 8.6–10.5)
CALCIUM SPEC-SCNC: 8 MG/DL (ref 8.6–10.5)
CALCIUM SPEC-SCNC: 8.2 MG/DL (ref 8.6–10.5)
CALCIUM SPEC-SCNC: 8.2 MG/DL (ref 8.6–10.5)
CALCIUM SPEC-SCNC: 8.4 MG/DL (ref 8.6–10.5)
CALCIUM SPEC-SCNC: 8.6 MG/DL (ref 8.6–10.5)
CALCIUM SPEC-SCNC: 8.7 MG/DL (ref 8.6–10.5)
CALCIUM SPEC-SCNC: 8.8 MG/DL (ref 8.6–10.5)
CALCIUM SPEC-SCNC: 9 MG/DL (ref 8.6–10.5)
CALCIUM SPEC-SCNC: 9.2 MG/DL (ref 8.6–10.5)
CALCIUM SPEC-SCNC: 9.2 MG/DL (ref 8.6–10.5)
CHLORIDE SERPL-SCNC: 100 MMOL/L (ref 98–107)
CHLORIDE SERPL-SCNC: 100 MMOL/L (ref 98–107)
CHLORIDE SERPL-SCNC: 102 MMOL/L (ref 98–107)
CHLORIDE SERPL-SCNC: 102 MMOL/L (ref 98–107)
CHLORIDE SERPL-SCNC: 103 MMOL/L (ref 98–107)
CHLORIDE SERPL-SCNC: 104 MMOL/L (ref 98–107)
CHLORIDE SERPL-SCNC: 104 MMOL/L (ref 98–107)
CHLORIDE SERPL-SCNC: 105 MMOL/L (ref 98–107)
CHLORIDE SERPL-SCNC: 105 MMOL/L (ref 98–107)
CHLORIDE SERPL-SCNC: 107 MMOL/L (ref 98–107)
CHLORIDE SERPL-SCNC: 108 MMOL/L (ref 98–107)
CHLORIDE SERPL-SCNC: 97 MMOL/L (ref 98–107)
CHLORIDE SERPL-SCNC: 98 MMOL/L (ref 98–107)
CHLORIDE SERPL-SCNC: 99 MMOL/L (ref 98–107)
CHOLEST SERPL-MCNC: 106 MG/DL (ref 100–199)
CK SERPL-CCNC: 55 U/L (ref 20–200)
CK SERPL-CCNC: 95 U/L (ref 20–200)
CLARITY UR: ABNORMAL
CLARITY UR: CLEAR
CLARITY UR: CLEAR
CO2 SERPL-SCNC: 18.9 MMOL/L (ref 22–29)
CO2 SERPL-SCNC: 20.5 MMOL/L (ref 22–29)
CO2 SERPL-SCNC: 21.9 MMOL/L (ref 22–29)
CO2 SERPL-SCNC: 22.4 MMOL/L (ref 22–29)
CO2 SERPL-SCNC: 22.6 MMOL/L (ref 22–29)
CO2 SERPL-SCNC: 22.6 MMOL/L (ref 22–29)
CO2 SERPL-SCNC: 22.7 MMOL/L (ref 22–29)
CO2 SERPL-SCNC: 23 MMOL/L (ref 22–29)
CO2 SERPL-SCNC: 23.3 MMOL/L (ref 22–29)
CO2 SERPL-SCNC: 23.4 MMOL/L (ref 22–29)
CO2 SERPL-SCNC: 23.5 MMOL/L (ref 22–29)
CO2 SERPL-SCNC: 25 MMOL/L (ref 22–29)
CO2 SERPL-SCNC: 25.2 MMOL/L (ref 22–29)
CO2 SERPL-SCNC: 25.4 MMOL/L (ref 22–29)
CO2 SERPL-SCNC: 25.7 MMOL/L (ref 22–29)
CO2 SERPL-SCNC: 27.9 MMOL/L (ref 22–29)
COLOR UR: ABNORMAL
COLOR UR: YELLOW
COLOR UR: YELLOW
CREAT SERPL-MCNC: 0.94 MG/DL (ref 0.76–1.27)
CREAT SERPL-MCNC: 0.96 MG/DL (ref 0.76–1.27)
CREAT SERPL-MCNC: 0.96 MG/DL (ref 0.76–1.27)
CREAT SERPL-MCNC: 0.99 MG/DL (ref 0.76–1.27)
CREAT SERPL-MCNC: 1.01 MG/DL (ref 0.76–1.27)
CREAT SERPL-MCNC: 1.05 MG/DL (ref 0.76–1.27)
CREAT SERPL-MCNC: 1.06 MG/DL (ref 0.76–1.27)
CREAT SERPL-MCNC: 1.07 MG/DL (ref 0.76–1.27)
CREAT SERPL-MCNC: 1.1 MG/DL (ref 0.76–1.27)
CREAT SERPL-MCNC: 1.11 MG/DL (ref 0.76–1.27)
CREAT SERPL-MCNC: 1.2 MG/DL (ref 0.76–1.27)
CREAT SERPL-MCNC: 1.26 MG/DL (ref 0.76–1.27)
CREAT SERPL-MCNC: 1.34 MG/DL (ref 0.76–1.27)
CREAT SERPL-MCNC: 1.54 MG/DL (ref 0.76–1.27)
CREAT SERPL-MCNC: 1.62 MG/DL (ref 0.76–1.27)
CREAT SERPL-MCNC: 1.94 MG/DL (ref 0.76–1.27)
CREAT UR-MCNC: 75.5 MG/DL
CREAT UR-MCNC: 75.5 MG/DL
CRP SERPL-MCNC: 0.42 MG/DL (ref 0–0.5)
CRP SERPL-MCNC: 0.47 MG/DL (ref 0–0.5)
CRP SERPL-MCNC: 0.68 MG/DL (ref 0–0.5)
CRP SERPL-MCNC: 0.89 MG/DL (ref 0–0.5)
CRP SERPL-MCNC: 1.68 MG/DL (ref 0–0.5)
CRP SERPL-MCNC: 4.97 MG/DL (ref 0–0.5)
CRP SERPL-MCNC: 7.13 MG/DL (ref 0–0.5)
CRP SERPL-MCNC: <0.3 MG/DL (ref 0–0.5)
D DIMER PPP FEU-MCNC: 1.06 MCGFEU/ML (ref 0–0.49)
D DIMER PPP FEU-MCNC: 1.24 MCGFEU/ML (ref 0–0.49)
D DIMER PPP FEU-MCNC: 1.37 MCGFEU/ML (ref 0–0.49)
D DIMER PPP FEU-MCNC: 1.41 MCGFEU/ML (ref 0–0.49)
D DIMER PPP FEU-MCNC: 1.98 MCGFEU/ML (ref 0–0.49)
D DIMER PPP FEU-MCNC: 10.92 MCGFEU/ML (ref 0–0.49)
D DIMER PPP FEU-MCNC: 2.53 MCGFEU/ML (ref 0–0.49)
D DIMER PPP FEU-MCNC: 3.29 MCGFEU/ML (ref 0–0.49)
D DIMER PPP FEU-MCNC: 3.67 MCGFEU/ML (ref 0–0.49)
D DIMER PPP FEU-MCNC: 3.89 MCGFEU/ML (ref 0–0.49)
D DIMER PPP FEU-MCNC: 4.73 MCGFEU/ML (ref 0–0.49)
D DIMER PPP FEU-MCNC: 5.01 MCGFEU/ML (ref 0–0.49)
D DIMER PPP FEU-MCNC: 5.74 MCGFEU/ML (ref 0–0.49)
D DIMER PPP FEU-MCNC: 6.13 MCGFEU/ML (ref 0–0.49)
D DIMER PPP FEU-MCNC: 9.32 MCGFEU/ML (ref 0–0.49)
D-LACTATE SERPL-SCNC: 1.7 MMOL/L (ref 0.5–2)
D-LACTATE SERPL-SCNC: 2.3 MMOL/L (ref 0.5–2)
DEPRECATED RDW RBC AUTO: 40.8 FL (ref 37–54)
DEPRECATED RDW RBC AUTO: 41.1 FL (ref 37–54)
DEPRECATED RDW RBC AUTO: 41.4 FL (ref 37–54)
DEPRECATED RDW RBC AUTO: 41.6 FL (ref 37–54)
DEPRECATED RDW RBC AUTO: 41.7 FL (ref 37–54)
DEPRECATED RDW RBC AUTO: 42.2 FL (ref 37–54)
DEPRECATED RDW RBC AUTO: 42.9 FL (ref 37–54)
DEPRECATED RDW RBC AUTO: 43.6 FL (ref 37–54)
DEPRECATED RDW RBC AUTO: 43.8 FL (ref 37–54)
DEPRECATED RDW RBC AUTO: 43.9 FL (ref 37–54)
DEPRECATED RDW RBC AUTO: 43.9 FL (ref 37–54)
DEPRECATED RDW RBC AUTO: 44 FL (ref 37–54)
DEPRECATED RDW RBC AUTO: 44.5 FL (ref 37–54)
DEPRECATED RDW RBC AUTO: 44.5 FL (ref 37–54)
DEPRECATED RDW RBC AUTO: 44.6 FL (ref 37–54)
DEPRECATED RDW RBC AUTO: 44.8 FL (ref 37–54)
DEPRECATED RDW RBC AUTO: 44.9 FL (ref 37–54)
DEPRECATED RDW RBC AUTO: 45.4 FL (ref 37–54)
DEPRECATED RDW RBC AUTO: 45.7 FL (ref 37–54)
EOSINOPHIL # BLD AUTO: 0 10*3/MM3 (ref 0–0.4)
EOSINOPHIL # BLD AUTO: 0.01 10*3/MM3 (ref 0–0.4)
EOSINOPHIL # BLD AUTO: 0.01 10*3/MM3 (ref 0–0.4)
EOSINOPHIL # BLD AUTO: 0.02 10*3/MM3 (ref 0–0.4)
EOSINOPHIL # BLD AUTO: 0.06 10*3/MM3 (ref 0–0.4)
EOSINOPHIL # BLD AUTO: 0.17 10*3/MM3 (ref 0–0.4)
EOSINOPHIL # BLD AUTO: 0.18 10*3/MM3 (ref 0–0.4)
EOSINOPHIL # BLD MANUAL: 0.14 10*3/MM3 (ref 0–0.4)
EOSINOPHIL # BLD MANUAL: 0.16 10*3/MM3 (ref 0–0.4)
EOSINOPHIL # BLD MANUAL: 0.44 10*3/MM3 (ref 0–0.4)
EOSINOPHIL NFR BLD AUTO: 0 % (ref 0.3–6.2)
EOSINOPHIL NFR BLD AUTO: 0.1 % (ref 0.3–6.2)
EOSINOPHIL NFR BLD AUTO: 0.2 % (ref 0.3–6.2)
EOSINOPHIL NFR BLD AUTO: 0.6 % (ref 0.3–6.2)
EOSINOPHIL NFR BLD AUTO: 1.6 % (ref 0.3–6.2)
EOSINOPHIL NFR BLD AUTO: 2.1 % (ref 0.3–6.2)
EOSINOPHIL NFR BLD MANUAL: 1 % (ref 0.3–6.2)
EOSINOPHIL NFR BLD MANUAL: 1 % (ref 0.3–6.2)
EOSINOPHIL NFR BLD MANUAL: 3 % (ref 0.3–6.2)
ERYTHROCYTE [DISTWIDTH] IN BLOOD BY AUTOMATED COUNT: 11.9 % (ref 12.3–15.4)
ERYTHROCYTE [DISTWIDTH] IN BLOOD BY AUTOMATED COUNT: 11.9 % (ref 12.3–15.4)
ERYTHROCYTE [DISTWIDTH] IN BLOOD BY AUTOMATED COUNT: 12.3 % (ref 12.3–15.4)
ERYTHROCYTE [DISTWIDTH] IN BLOOD BY AUTOMATED COUNT: 12.4 % (ref 12.3–15.4)
ERYTHROCYTE [DISTWIDTH] IN BLOOD BY AUTOMATED COUNT: 12.5 % (ref 12.3–15.4)
ERYTHROCYTE [DISTWIDTH] IN BLOOD BY AUTOMATED COUNT: 12.6 % (ref 12.3–15.4)
ERYTHROCYTE [DISTWIDTH] IN BLOOD BY AUTOMATED COUNT: 12.7 % (ref 12.3–15.4)
ERYTHROCYTE [DISTWIDTH] IN BLOOD BY AUTOMATED COUNT: 12.7 % (ref 12.3–15.4)
ERYTHROCYTE [DISTWIDTH] IN BLOOD BY AUTOMATED COUNT: 12.8 % (ref 12.3–15.4)
ERYTHROCYTE [DISTWIDTH] IN BLOOD BY AUTOMATED COUNT: 12.9 % (ref 12.3–15.4)
ERYTHROCYTE [DISTWIDTH] IN BLOOD BY AUTOMATED COUNT: 13.1 % (ref 12.3–15.4)
ERYTHROCYTE [DISTWIDTH] IN BLOOD BY AUTOMATED COUNT: 13.1 % (ref 12.3–15.4)
ERYTHROCYTE [DISTWIDTH] IN BLOOD BY AUTOMATED COUNT: 13.2 % (ref 12.3–15.4)
FERRITIN SERPL-MCNC: 1021 NG/ML (ref 30–400)
FERRITIN SERPL-MCNC: 1400 NG/ML (ref 30–400)
FERRITIN SERPL-MCNC: 1560 NG/ML (ref 30–400)
FERRITIN SERPL-MCNC: 1596 NG/ML (ref 30–400)
FERRITIN SERPL-MCNC: 1599 NG/ML (ref 30–400)
FERRITIN SERPL-MCNC: 1639 NG/ML (ref 30–400)
FERRITIN SERPL-MCNC: 761 NG/ML (ref 30–400)
FERRITIN SERPL-MCNC: 890 NG/ML (ref 30–400)
FERRITIN SERPL-MCNC: 899 NG/ML (ref 30–400)
FERRITIN SERPL-MCNC: 906 NG/ML (ref 30–400)
FERRITIN SERPL-MCNC: 969 NG/ML (ref 30–400)
FLUAV SUBTYP SPEC NAA+PROBE: NOT DETECTED
FLUBV RNA ISLT QL NAA+PROBE: NOT DETECTED
GAS FLOW AIRWAY: 15 LPM
GAS FLOW AIRWAY: 15 LPM
GFR SERPL CREATININE-BSD FRML MDRD: 34 ML/MIN/1.73
GFR SERPL CREATININE-BSD FRML MDRD: 42 ML/MIN/1.73
GFR SERPL CREATININE-BSD FRML MDRD: 45 ML/MIN/1.73
GFR SERPL CREATININE-BSD FRML MDRD: 53 ML/MIN/1.73
GFR SERPL CREATININE-BSD FRML MDRD: 56 ML/MIN/1.73
GFR SERPL CREATININE-BSD FRML MDRD: 60 ML/MIN/1.73
GFR SERPL CREATININE-BSD FRML MDRD: 65 ML/MIN/1.73
GFR SERPL CREATININE-BSD FRML MDRD: 66 ML/MIN/1.73
GFR SERPL CREATININE-BSD FRML MDRD: 68 ML/MIN/1.73
GFR SERPL CREATININE-BSD FRML MDRD: 69 ML/MIN/1.73
GFR SERPL CREATININE-BSD FRML MDRD: 70 ML/MIN/1.73
GFR SERPL CREATININE-BSD FRML MDRD: 73 ML/MIN/1.73
GFR SERPL CREATININE-BSD FRML MDRD: 75 ML/MIN/1.73
GFR SERPL CREATININE-BSD FRML MDRD: 77 ML/MIN/1.73
GFR SERPL CREATININE-BSD FRML MDRD: 77 ML/MIN/1.73
GFR SERPL CREATININE-BSD FRML MDRD: 79 ML/MIN/1.73
GLOBULIN UR ELPH-MCNC: 1.8 GM/DL
GLOBULIN UR ELPH-MCNC: 1.8 GM/DL
GLOBULIN UR ELPH-MCNC: 1.9 GM/DL
GLOBULIN UR ELPH-MCNC: 1.9 GM/DL
GLOBULIN UR ELPH-MCNC: 2.1 GM/DL
GLOBULIN UR ELPH-MCNC: 2.2 GM/DL
GLOBULIN UR ELPH-MCNC: 2.3 GM/DL
GLOBULIN UR ELPH-MCNC: 2.4 GM/DL
GLOBULIN UR ELPH-MCNC: 2.4 GM/DL
GLOBULIN UR ELPH-MCNC: 2.5 GM/DL
GLOBULIN UR ELPH-MCNC: 2.5 GM/DL
GLOBULIN UR ELPH-MCNC: 2.7 GM/DL
GLOBULIN UR ELPH-MCNC: 2.7 GM/DL
GLOBULIN UR ELPH-MCNC: 2.8 GM/DL
GLOBULIN UR ELPH-MCNC: 3.1 GM/DL
GLOBULIN UR ELPH-MCNC: 3.2 GM/DL
GLUCOSE BLDC GLUCOMTR-MCNC: 107 MG/DL (ref 70–130)
GLUCOSE BLDC GLUCOMTR-MCNC: 114 MG/DL (ref 70–130)
GLUCOSE BLDC GLUCOMTR-MCNC: 116 MG/DL (ref 70–130)
GLUCOSE BLDC GLUCOMTR-MCNC: 118 MG/DL (ref 70–130)
GLUCOSE BLDC GLUCOMTR-MCNC: 120 MG/DL (ref 70–130)
GLUCOSE BLDC GLUCOMTR-MCNC: 121 MG/DL (ref 70–130)
GLUCOSE BLDC GLUCOMTR-MCNC: 123 MG/DL (ref 70–130)
GLUCOSE BLDC GLUCOMTR-MCNC: 123 MG/DL (ref 70–130)
GLUCOSE BLDC GLUCOMTR-MCNC: 124 MG/DL (ref 70–130)
GLUCOSE BLDC GLUCOMTR-MCNC: 128 MG/DL (ref 70–130)
GLUCOSE BLDC GLUCOMTR-MCNC: 130 MG/DL (ref 70–130)
GLUCOSE BLDC GLUCOMTR-MCNC: 131 MG/DL (ref 70–130)
GLUCOSE BLDC GLUCOMTR-MCNC: 132 MG/DL (ref 70–130)
GLUCOSE BLDC GLUCOMTR-MCNC: 134 MG/DL (ref 70–130)
GLUCOSE BLDC GLUCOMTR-MCNC: 134 MG/DL (ref 70–130)
GLUCOSE BLDC GLUCOMTR-MCNC: 135 MG/DL (ref 70–130)
GLUCOSE BLDC GLUCOMTR-MCNC: 135 MG/DL (ref 70–130)
GLUCOSE BLDC GLUCOMTR-MCNC: 136 MG/DL (ref 70–130)
GLUCOSE BLDC GLUCOMTR-MCNC: 137 MG/DL (ref 70–130)
GLUCOSE BLDC GLUCOMTR-MCNC: 138 MG/DL (ref 70–130)
GLUCOSE BLDC GLUCOMTR-MCNC: 138 MG/DL (ref 70–130)
GLUCOSE BLDC GLUCOMTR-MCNC: 140 MG/DL (ref 70–130)
GLUCOSE BLDC GLUCOMTR-MCNC: 140 MG/DL (ref 70–130)
GLUCOSE BLDC GLUCOMTR-MCNC: 143 MG/DL (ref 70–130)
GLUCOSE BLDC GLUCOMTR-MCNC: 144 MG/DL (ref 70–130)
GLUCOSE BLDC GLUCOMTR-MCNC: 145 MG/DL (ref 70–130)
GLUCOSE BLDC GLUCOMTR-MCNC: 148 MG/DL (ref 70–130)
GLUCOSE BLDC GLUCOMTR-MCNC: 150 MG/DL (ref 70–130)
GLUCOSE BLDC GLUCOMTR-MCNC: 151 MG/DL (ref 70–130)
GLUCOSE BLDC GLUCOMTR-MCNC: 152 MG/DL (ref 70–130)
GLUCOSE BLDC GLUCOMTR-MCNC: 152 MG/DL (ref 70–130)
GLUCOSE BLDC GLUCOMTR-MCNC: 156 MG/DL (ref 70–130)
GLUCOSE BLDC GLUCOMTR-MCNC: 165 MG/DL (ref 70–130)
GLUCOSE BLDC GLUCOMTR-MCNC: 168 MG/DL (ref 70–130)
GLUCOSE BLDC GLUCOMTR-MCNC: 169 MG/DL (ref 70–130)
GLUCOSE BLDC GLUCOMTR-MCNC: 170 MG/DL (ref 70–130)
GLUCOSE BLDC GLUCOMTR-MCNC: 171 MG/DL (ref 70–130)
GLUCOSE BLDC GLUCOMTR-MCNC: 171 MG/DL (ref 70–130)
GLUCOSE BLDC GLUCOMTR-MCNC: 178 MG/DL (ref 70–130)
GLUCOSE BLDC GLUCOMTR-MCNC: 182 MG/DL (ref 70–130)
GLUCOSE BLDC GLUCOMTR-MCNC: 185 MG/DL (ref 70–130)
GLUCOSE BLDC GLUCOMTR-MCNC: 189 MG/DL (ref 70–130)
GLUCOSE BLDC GLUCOMTR-MCNC: 189 MG/DL (ref 70–130)
GLUCOSE BLDC GLUCOMTR-MCNC: 192 MG/DL (ref 70–130)
GLUCOSE BLDC GLUCOMTR-MCNC: 193 MG/DL (ref 70–130)
GLUCOSE BLDC GLUCOMTR-MCNC: 199 MG/DL (ref 70–130)
GLUCOSE BLDC GLUCOMTR-MCNC: 96 MG/DL (ref 70–130)
GLUCOSE SERPL-MCNC: 100 MG/DL (ref 65–99)
GLUCOSE SERPL-MCNC: 107 MG/DL (ref 65–99)
GLUCOSE SERPL-MCNC: 108 MG/DL (ref 65–99)
GLUCOSE SERPL-MCNC: 110 MG/DL (ref 65–99)
GLUCOSE SERPL-MCNC: 113 MG/DL (ref 65–99)
GLUCOSE SERPL-MCNC: 116 MG/DL (ref 65–99)
GLUCOSE SERPL-MCNC: 135 MG/DL (ref 65–99)
GLUCOSE SERPL-MCNC: 136 MG/DL (ref 65–99)
GLUCOSE SERPL-MCNC: 140 MG/DL (ref 65–99)
GLUCOSE SERPL-MCNC: 143 MG/DL (ref 65–99)
GLUCOSE SERPL-MCNC: 143 MG/DL (ref 65–99)
GLUCOSE SERPL-MCNC: 148 MG/DL (ref 65–99)
GLUCOSE SERPL-MCNC: 150 MG/DL (ref 65–99)
GLUCOSE SERPL-MCNC: 153 MG/DL (ref 65–99)
GLUCOSE SERPL-MCNC: 179 MG/DL (ref 65–99)
GLUCOSE SERPL-MCNC: 205 MG/DL (ref 65–99)
GLUCOSE SERPL-MCNC: 96 MG/DL (ref 65–99)
GLUCOSE SERPL-MCNC: 99 MG/DL (ref 65–99)
GLUCOSE UR STRIP-MCNC: NEGATIVE MG/DL
HADV DNA SPEC NAA+PROBE: NOT DETECTED
HBA1C MFR BLD: 5.7 % (ref 4.8–5.6)
HCO3 BLDA-SCNC: 20.4 MMOL/L (ref 22–28)
HCO3 BLDA-SCNC: 21.1 MMOL/L (ref 22–28)
HCO3 BLDA-SCNC: 25.8 MMOL/L (ref 22–28)
HCO3 BLDA-SCNC: 25.8 MMOL/L (ref 22–28)
HCO3 BLDA-SCNC: 26.3 MMOL/L (ref 22–28)
HCO3 BLDA-SCNC: 26.8 MMOL/L (ref 22–28)
HCO3 BLDA-SCNC: 27.2 MMOL/L (ref 22–28)
HCO3 BLDA-SCNC: 27.4 MMOL/L (ref 22–28)
HCOV 229E RNA SPEC QL NAA+PROBE: NOT DETECTED
HCOV HKU1 RNA SPEC QL NAA+PROBE: NOT DETECTED
HCOV NL63 RNA SPEC QL NAA+PROBE: NOT DETECTED
HCOV OC43 RNA SPEC QL NAA+PROBE: NOT DETECTED
HCT VFR BLD AUTO: 36.5 % (ref 37.5–51)
HCT VFR BLD AUTO: 39.6 % (ref 37.5–51)
HCT VFR BLD AUTO: 40.3 % (ref 37.5–51)
HCT VFR BLD AUTO: 41 % (ref 37.5–51)
HCT VFR BLD AUTO: 41.4 % (ref 37.5–51)
HCT VFR BLD AUTO: 41.5 % (ref 37.5–51)
HCT VFR BLD AUTO: 41.8 % (ref 37.5–51)
HCT VFR BLD AUTO: 41.9 % (ref 37.5–51)
HCT VFR BLD AUTO: 42 % (ref 37.5–51)
HCT VFR BLD AUTO: 42.1 % (ref 37.5–51)
HCT VFR BLD AUTO: 42.2 % (ref 37.5–51)
HCT VFR BLD AUTO: 43.1 % (ref 37.5–51)
HCT VFR BLD AUTO: 43.3 % (ref 37.5–51)
HCT VFR BLD AUTO: 43.7 % (ref 37.5–51)
HCT VFR BLD AUTO: 43.9 % (ref 37.5–51)
HCT VFR BLD AUTO: 44.2 % (ref 37.5–51)
HCT VFR BLD AUTO: 45.1 % (ref 37.5–51)
HCT VFR BLD AUTO: 45.6 % (ref 37.5–51)
HCT VFR BLD AUTO: 48.3 % (ref 37.5–51)
HDL SERPL-SCNC: 27.3 UMOL/L
HDLC SERPL-MCNC: 34 MG/DL
HGB BLD-MCNC: 12.8 G/DL (ref 13–17.7)
HGB BLD-MCNC: 13 G/DL (ref 13–17.7)
HGB BLD-MCNC: 13.7 G/DL (ref 13–17.7)
HGB BLD-MCNC: 13.8 G/DL (ref 13–17.7)
HGB BLD-MCNC: 14 G/DL (ref 13–17.7)
HGB BLD-MCNC: 14.1 G/DL (ref 13–17.7)
HGB BLD-MCNC: 14.2 G/DL (ref 13–17.7)
HGB BLD-MCNC: 14.3 G/DL (ref 13–17.7)
HGB BLD-MCNC: 14.5 G/DL (ref 13–17.7)
HGB BLD-MCNC: 14.5 G/DL (ref 13–17.7)
HGB BLD-MCNC: 14.8 G/DL (ref 13–17.7)
HGB BLD-MCNC: 14.9 G/DL (ref 13–17.7)
HGB BLD-MCNC: 15 G/DL (ref 13–17.7)
HGB BLD-MCNC: 15.1 G/DL (ref 13–17.7)
HGB BLD-MCNC: 15.4 G/DL (ref 13–17.7)
HGB BLD-MCNC: 15.7 G/DL (ref 13–17.7)
HGB BLD-MCNC: 16.1 G/DL (ref 13–17.7)
HGB UR QL STRIP.AUTO: ABNORMAL
HGB UR QL STRIP.AUTO: NEGATIVE
HGB UR QL STRIP.AUTO: NEGATIVE
HMPV RNA NPH QL NAA+NON-PROBE: NOT DETECTED
HPIV1 RNA SPEC QL NAA+PROBE: NOT DETECTED
HPIV2 RNA SPEC QL NAA+PROBE: NOT DETECTED
HPIV3 RNA NPH QL NAA+PROBE: NOT DETECTED
HPIV4 P GENE NPH QL NAA+PROBE: NOT DETECTED
HYALINE CASTS UR QL AUTO: ABNORMAL /LPF
HYALINE CASTS UR QL AUTO: ABNORMAL /LPF
IMM GRANULOCYTES # BLD AUTO: 0.02 10*3/MM3 (ref 0–0.05)
IMM GRANULOCYTES # BLD AUTO: 0.03 10*3/MM3 (ref 0–0.05)
IMM GRANULOCYTES # BLD AUTO: 0.05 10*3/MM3 (ref 0–0.05)
IMM GRANULOCYTES # BLD AUTO: 0.06 10*3/MM3 (ref 0–0.05)
IMM GRANULOCYTES # BLD AUTO: 0.08 10*3/MM3 (ref 0–0.05)
IMM GRANULOCYTES # BLD AUTO: 0.09 10*3/MM3 (ref 0–0.05)
IMM GRANULOCYTES # BLD AUTO: 0.1 10*3/MM3 (ref 0–0.05)
IMM GRANULOCYTES # BLD AUTO: 0.1 10*3/MM3 (ref 0–0.05)
IMM GRANULOCYTES # BLD AUTO: 0.11 10*3/MM3 (ref 0–0.05)
IMM GRANULOCYTES # BLD AUTO: 0.12 10*3/MM3 (ref 0–0.05)
IMM GRANULOCYTES # BLD AUTO: 0.14 10*3/MM3 (ref 0–0.05)
IMM GRANULOCYTES NFR BLD AUTO: 0.3 % (ref 0–0.5)
IMM GRANULOCYTES NFR BLD AUTO: 0.5 % (ref 0–0.5)
IMM GRANULOCYTES NFR BLD AUTO: 0.5 % (ref 0–0.5)
IMM GRANULOCYTES NFR BLD AUTO: 0.6 % (ref 0–0.5)
IMM GRANULOCYTES NFR BLD AUTO: 0.6 % (ref 0–0.5)
IMM GRANULOCYTES NFR BLD AUTO: 0.7 % (ref 0–0.5)
IMM GRANULOCYTES NFR BLD AUTO: 0.8 % (ref 0–0.5)
IMM GRANULOCYTES NFR BLD AUTO: 1.1 % (ref 0–0.5)
IMM GRANULOCYTES NFR BLD AUTO: 1.7 % (ref 0–0.5)
INHALED O2 CONCENTRATION: 100 %
INHALED O2 CONCENTRATION: 80 %
INHALED O2 CONCENTRATION: 90 %
INR PPP: 1.01 (ref 0.9–1.1)
KETONES UR QL STRIP: ABNORMAL
KETONES UR QL STRIP: NEGATIVE
KETONES UR QL STRIP: NEGATIVE
LABCORP SARS-COV-2, NAA 2 DAY TAT: NORMAL
LDH SERPL-CCNC: 540 U/L (ref 135–225)
LDL SERPL QN: 20.1 NM
LDL SERPL-SCNC: 736 NMOL/L
LDL SMALL SERPL-SCNC: 482 NMOL/L
LDLC SERPL CALC-MCNC: 46 MG/DL (ref 0–99)
LEUKOCYTE ESTERASE UR QL STRIP.AUTO: ABNORMAL
LEUKOCYTE ESTERASE UR QL STRIP.AUTO: ABNORMAL
LEUKOCYTE ESTERASE UR QL STRIP.AUTO: NEGATIVE
LYMPHOCYTES # BLD AUTO: 0.4 10*3/MM3 (ref 0.7–3.1)
LYMPHOCYTES # BLD AUTO: 0.41 10*3/MM3 (ref 0.7–3.1)
LYMPHOCYTES # BLD AUTO: 0.42 10*3/MM3 (ref 0.7–3.1)
LYMPHOCYTES # BLD AUTO: 0.47 10*3/MM3 (ref 0.7–3.1)
LYMPHOCYTES # BLD AUTO: 0.49 10*3/MM3 (ref 0.7–3.1)
LYMPHOCYTES # BLD AUTO: 0.55 10*3/MM3 (ref 0.7–3.1)
LYMPHOCYTES # BLD AUTO: 0.65 10*3/MM3 (ref 0.7–3.1)
LYMPHOCYTES # BLD AUTO: 0.72 10*3/MM3 (ref 0.7–3.1)
LYMPHOCYTES # BLD AUTO: 0.74 10*3/MM3 (ref 0.7–3.1)
LYMPHOCYTES # BLD AUTO: 0.75 10*3/MM3 (ref 0.7–3.1)
LYMPHOCYTES # BLD AUTO: 0.83 10*3/MM3 (ref 0.7–3.1)
LYMPHOCYTES # BLD AUTO: 0.94 10*3/MM3 (ref 0.7–3.1)
LYMPHOCYTES # BLD AUTO: 1.25 10*3/MM3 (ref 0.7–3.1)
LYMPHOCYTES # BLD AUTO: 1.37 10*3/MM3 (ref 0.7–3.1)
LYMPHOCYTES # BLD MANUAL: 0.32 10*3/MM3 (ref 0.7–3.1)
LYMPHOCYTES # BLD MANUAL: 0.49 10*3/MM3 (ref 0.7–3.1)
LYMPHOCYTES # BLD MANUAL: 0.75 10*3/MM3 (ref 0.7–3.1)
LYMPHOCYTES # BLD MANUAL: 1.16 10*3/MM3 (ref 0.7–3.1)
LYMPHOCYTES NFR BLD AUTO: 11.6 % (ref 19.6–45.3)
LYMPHOCYTES NFR BLD AUTO: 11.9 % (ref 19.6–45.3)
LYMPHOCYTES NFR BLD AUTO: 12.3 % (ref 19.6–45.3)
LYMPHOCYTES NFR BLD AUTO: 17.3 % (ref 19.6–45.3)
LYMPHOCYTES NFR BLD AUTO: 17.3 % (ref 19.6–45.3)
LYMPHOCYTES NFR BLD AUTO: 2.9 % (ref 19.6–45.3)
LYMPHOCYTES NFR BLD AUTO: 3.2 % (ref 19.6–45.3)
LYMPHOCYTES NFR BLD AUTO: 3.2 % (ref 19.6–45.3)
LYMPHOCYTES NFR BLD AUTO: 3.3 % (ref 19.6–45.3)
LYMPHOCYTES NFR BLD AUTO: 3.6 % (ref 19.6–45.3)
LYMPHOCYTES NFR BLD AUTO: 3.7 % (ref 19.6–45.3)
LYMPHOCYTES NFR BLD AUTO: 6.5 % (ref 19.6–45.3)
LYMPHOCYTES NFR BLD AUTO: 8.8 % (ref 19.6–45.3)
LYMPHOCYTES NFR BLD AUTO: 9.9 % (ref 19.6–45.3)
LYMPHOCYTES NFR BLD MANUAL: 1 % (ref 5–12)
LYMPHOCYTES NFR BLD MANUAL: 11 % (ref 5–12)
LYMPHOCYTES NFR BLD MANUAL: 2 % (ref 5–12)
LYMPHOCYTES NFR BLD MANUAL: 3 % (ref 19.6–45.3)
LYMPHOCYTES NFR BLD MANUAL: 5.2 % (ref 19.6–45.3)
LYMPHOCYTES NFR BLD MANUAL: 6.1 % (ref 5–12)
LYMPHOCYTES NFR BLD MANUAL: 7.1 % (ref 19.6–45.3)
LYMPHOCYTES NFR BLD MANUAL: 8 % (ref 19.6–45.3)
M PNEUMO IGG SER IA-ACNC: NOT DETECTED
MAGNESIUM SERPL-MCNC: 1.9 MG/DL (ref 1.6–2.4)
MAGNESIUM SERPL-MCNC: 2 MG/DL (ref 1.6–2.4)
MAGNESIUM SERPL-MCNC: 2.1 MG/DL (ref 1.6–2.4)
MAGNESIUM SERPL-MCNC: 2.1 MG/DL (ref 1.6–2.4)
MAGNESIUM SERPL-MCNC: 2.2 MG/DL (ref 1.6–2.4)
MAGNESIUM SERPL-MCNC: 2.3 MG/DL (ref 1.6–2.4)
MAGNESIUM SERPL-MCNC: 2.3 MG/DL (ref 1.6–2.4)
MAGNESIUM SERPL-MCNC: 2.4 MG/DL (ref 1.6–2.4)
MAGNESIUM SERPL-MCNC: 2.6 MG/DL (ref 1.6–2.4)
MAGNESIUM SERPL-MCNC: 2.7 MG/DL (ref 1.6–2.4)
MAGNESIUM SERPL-MCNC: 2.9 MG/DL (ref 1.6–2.4)
MCH RBC QN AUTO: 31 PG (ref 26.6–33)
MCH RBC QN AUTO: 31.2 PG (ref 26.6–33)
MCH RBC QN AUTO: 31.3 PG (ref 26.6–33)
MCH RBC QN AUTO: 31.4 PG (ref 26.6–33)
MCH RBC QN AUTO: 31.4 PG (ref 26.6–33)
MCH RBC QN AUTO: 31.5 PG (ref 26.6–33)
MCH RBC QN AUTO: 31.7 PG (ref 26.6–33)
MCH RBC QN AUTO: 31.7 PG (ref 26.6–33)
MCH RBC QN AUTO: 31.8 PG (ref 26.6–33)
MCH RBC QN AUTO: 31.8 PG (ref 26.6–33)
MCH RBC QN AUTO: 31.9 PG (ref 26.6–33)
MCH RBC QN AUTO: 32 PG (ref 26.6–33)
MCH RBC QN AUTO: 32.4 PG (ref 26.6–33)
MCH RBC QN AUTO: 33 PG (ref 26.6–33)
MCHC RBC AUTO-ENTMCNC: 32.3 G/DL (ref 31.5–35.7)
MCHC RBC AUTO-ENTMCNC: 32.8 G/DL (ref 31.5–35.7)
MCHC RBC AUTO-ENTMCNC: 33.3 G/DL (ref 31.5–35.7)
MCHC RBC AUTO-ENTMCNC: 33.3 G/DL (ref 31.5–35.7)
MCHC RBC AUTO-ENTMCNC: 33.5 G/DL (ref 31.5–35.7)
MCHC RBC AUTO-ENTMCNC: 33.9 G/DL (ref 31.5–35.7)
MCHC RBC AUTO-ENTMCNC: 33.9 G/DL (ref 31.5–35.7)
MCHC RBC AUTO-ENTMCNC: 34 G/DL (ref 31.5–35.7)
MCHC RBC AUTO-ENTMCNC: 34 G/DL (ref 31.5–35.7)
MCHC RBC AUTO-ENTMCNC: 34.1 G/DL (ref 31.5–35.7)
MCHC RBC AUTO-ENTMCNC: 34.4 G/DL (ref 31.5–35.7)
MCHC RBC AUTO-ENTMCNC: 34.4 G/DL (ref 31.5–35.7)
MCHC RBC AUTO-ENTMCNC: 34.6 G/DL (ref 31.5–35.7)
MCHC RBC AUTO-ENTMCNC: 35 G/DL (ref 31.5–35.7)
MCHC RBC AUTO-ENTMCNC: 35.1 G/DL (ref 31.5–35.7)
MCV RBC AUTO: 90.8 FL (ref 79–97)
MCV RBC AUTO: 90.9 FL (ref 79–97)
MCV RBC AUTO: 91.5 FL (ref 79–97)
MCV RBC AUTO: 91.5 FL (ref 79–97)
MCV RBC AUTO: 91.7 FL (ref 79–97)
MCV RBC AUTO: 92.1 FL (ref 79–97)
MCV RBC AUTO: 92.3 FL (ref 79–97)
MCV RBC AUTO: 92.7 FL (ref 79–97)
MCV RBC AUTO: 93.1 FL (ref 79–97)
MCV RBC AUTO: 93.2 FL (ref 79–97)
MCV RBC AUTO: 93.5 FL (ref 79–97)
MCV RBC AUTO: 93.7 FL (ref 79–97)
MCV RBC AUTO: 94 FL (ref 79–97)
MCV RBC AUTO: 94.1 FL (ref 79–97)
MCV RBC AUTO: 94.1 FL (ref 79–97)
MCV RBC AUTO: 94.4 FL (ref 79–97)
MCV RBC AUTO: 95.1 FL (ref 79–97)
MCV RBC AUTO: 96.7 FL (ref 79–97)
MCV RBC AUTO: 97.6 FL (ref 79–97)
MICROALBUMIN/CREAT UR: 55.6 MG/G
MODALITY: ABNORMAL
MONOCYTES # BLD AUTO: 0.14 10*3/MM3 (ref 0.1–0.9)
MONOCYTES # BLD AUTO: 0.14 10*3/MM3 (ref 0.1–0.9)
MONOCYTES # BLD AUTO: 0.17 10*3/MM3 (ref 0.1–0.9)
MONOCYTES # BLD AUTO: 0.17 10*3/MM3 (ref 0.1–0.9)
MONOCYTES # BLD AUTO: 0.2 10*3/MM3 (ref 0.1–0.9)
MONOCYTES # BLD AUTO: 0.21 10*3/MM3 (ref 0.1–0.9)
MONOCYTES # BLD AUTO: 0.27 10*3/MM3 (ref 0.1–0.9)
MONOCYTES # BLD AUTO: 0.29 10*3/MM3 (ref 0.1–0.9)
MONOCYTES # BLD AUTO: 0.33 10*3/MM3 (ref 0.1–0.9)
MONOCYTES # BLD AUTO: 0.34 10*3/MM3 (ref 0.1–0.9)
MONOCYTES # BLD AUTO: 0.34 10*3/MM3 (ref 0.1–0.9)
MONOCYTES # BLD AUTO: 0.35 10*3/MM3 (ref 0.1–0.9)
MONOCYTES # BLD AUTO: 0.4 10*3/MM3 (ref 0.1–0.9)
MONOCYTES # BLD AUTO: 0.41 10*3/MM3 (ref 0.1–0.9)
MONOCYTES # BLD AUTO: 0.44 10*3/MM3 (ref 0.1–0.9)
MONOCYTES # BLD AUTO: 0.53 10*3/MM3 (ref 0.1–0.9)
MONOCYTES # BLD AUTO: 0.67 10*3/MM3 (ref 0.1–0.9)
MONOCYTES # BLD AUTO: 1.6 10*3/MM3 (ref 0.1–0.9)
MONOCYTES NFR BLD AUTO: 1.1 % (ref 5–12)
MONOCYTES NFR BLD AUTO: 1.2 % (ref 5–12)
MONOCYTES NFR BLD AUTO: 1.7 % (ref 5–12)
MONOCYTES NFR BLD AUTO: 2.2 % (ref 5–12)
MONOCYTES NFR BLD AUTO: 2.4 % (ref 5–12)
MONOCYTES NFR BLD AUTO: 2.5 % (ref 5–12)
MONOCYTES NFR BLD AUTO: 2.7 % (ref 5–12)
MONOCYTES NFR BLD AUTO: 3.1 % (ref 5–12)
MONOCYTES NFR BLD AUTO: 3.2 % (ref 5–12)
MONOCYTES NFR BLD AUTO: 3.9 % (ref 5–12)
MONOCYTES NFR BLD AUTO: 4.9 % (ref 5–12)
MONOCYTES NFR BLD AUTO: 5.9 % (ref 5–12)
MONOCYTES NFR BLD AUTO: 8.2 % (ref 5–12)
MONOCYTES NFR BLD AUTO: 8.5 % (ref 5–12)
NEUTROPHILS # BLD AUTO: 11.35 10*3/MM3 (ref 1.7–7)
NEUTROPHILS # BLD AUTO: 13.44 10*3/MM3 (ref 1.7–7)
NEUTROPHILS # BLD AUTO: 15.45 10*3/MM3 (ref 1.7–7)
NEUTROPHILS # BLD AUTO: 3.85 10*3/MM3 (ref 1.7–7)
NEUTROPHILS NFR BLD AUTO: 11.7 10*3/MM3 (ref 1.7–7)
NEUTROPHILS NFR BLD AUTO: 12.35 10*3/MM3 (ref 1.7–7)
NEUTROPHILS NFR BLD AUTO: 12.92 10*3/MM3 (ref 1.7–7)
NEUTROPHILS NFR BLD AUTO: 12.98 10*3/MM3 (ref 1.7–7)
NEUTROPHILS NFR BLD AUTO: 13.66 10*3/MM3 (ref 1.7–7)
NEUTROPHILS NFR BLD AUTO: 13.73 10*3/MM3 (ref 1.7–7)
NEUTROPHILS NFR BLD AUTO: 4.24 10*3/MM3 (ref 1.7–7)
NEUTROPHILS NFR BLD AUTO: 5.15 10*3/MM3 (ref 1.7–7)
NEUTROPHILS NFR BLD AUTO: 5.23 10*3/MM3 (ref 1.7–7)
NEUTROPHILS NFR BLD AUTO: 5.65 10*3/MM3 (ref 1.7–7)
NEUTROPHILS NFR BLD AUTO: 6.24 10*3/MM3 (ref 1.7–7)
NEUTROPHILS NFR BLD AUTO: 7.03 10*3/MM3 (ref 1.7–7)
NEUTROPHILS NFR BLD AUTO: 71.4 % (ref 42.7–76)
NEUTROPHILS NFR BLD AUTO: 78.2 % (ref 42.7–76)
NEUTROPHILS NFR BLD AUTO: 79.5 % (ref 42.7–76)
NEUTROPHILS NFR BLD AUTO: 8.57 10*3/MM3 (ref 1.7–7)
NEUTROPHILS NFR BLD AUTO: 83.5 % (ref 42.7–76)
NEUTROPHILS NFR BLD AUTO: 84.1 % (ref 42.7–76)
NEUTROPHILS NFR BLD AUTO: 84.4 % (ref 42.7–76)
NEUTROPHILS NFR BLD AUTO: 84.4 % (ref 42.7–76)
NEUTROPHILS NFR BLD AUTO: 88.1 % (ref 42.7–76)
NEUTROPHILS NFR BLD AUTO: 9.83 10*3/MM3 (ref 1.7–7)
NEUTROPHILS NFR BLD AUTO: 92.8 % (ref 42.7–76)
NEUTROPHILS NFR BLD AUTO: 93.4 % (ref 42.7–76)
NEUTROPHILS NFR BLD AUTO: 93.5 % (ref 42.7–76)
NEUTROPHILS NFR BLD AUTO: 93.9 % (ref 42.7–76)
NEUTROPHILS NFR BLD AUTO: 94.3 % (ref 42.7–76)
NEUTROPHILS NFR BLD AUTO: 94.4 % (ref 42.7–76)
NEUTROPHILS NFR BLD MANUAL: 78 % (ref 42.7–76)
NEUTROPHILS NFR BLD MANUAL: 86.7 % (ref 42.7–76)
NEUTROPHILS NFR BLD MANUAL: 92.8 % (ref 42.7–76)
NEUTROPHILS NFR BLD MANUAL: 94 % (ref 42.7–76)
NITRITE UR QL STRIP: NEGATIVE
NRBC BLD AUTO-RTO: 0 /100 WBC (ref 0–0.2)
NRBC BLD AUTO-RTO: 0.1 /100 WBC (ref 0–0.2)
NT-PROBNP SERPL-MCNC: 604 PG/ML (ref 0–900)
NT-PROBNP SERPL-MCNC: 66.5 PG/ML (ref 0–900)
NT-PROBNP SERPL-MCNC: 94.2 PG/ML (ref 0–900)
O2 A-A PPRESDIFF RESPIRATORY: 0.1 MMHG
O2 A-A PPRESDIFF RESPIRATORY: 0.2 MMHG
O2 A-A PPRESDIFF RESPIRATORY: 0.3 MMHG
PCO2 BLDA: 29.7 MM HG (ref 35–45)
PCO2 BLDA: 30.5 MM HG (ref 35–45)
PCO2 BLDA: 59.3 MM HG (ref 35–45)
PCO2 BLDA: 75.2 MM HG (ref 35–45)
PCO2 BLDA: 75.6 MM HG (ref 35–45)
PCO2 BLDA: 77 MM HG (ref 35–45)
PCO2 BLDA: 79.8 MM HG (ref 35–45)
PCO2 BLDA: 93.3 MM HG (ref 35–45)
PEEP RESPIRATORY: 16 CM[H2O]
PEEP RESPIRATORY: 16 CM[H2O]
PEEP RESPIRATORY: 18 CM[H2O]
PH BLDA: 7.08 PH UNITS (ref 7.35–7.45)
PH BLDA: 7.12 PH UNITS (ref 7.35–7.45)
PH BLDA: 7.14 PH UNITS (ref 7.35–7.45)
PH BLDA: 7.16 PH UNITS (ref 7.35–7.45)
PH BLDA: 7.16 PH UNITS (ref 7.35–7.45)
PH BLDA: 7.25 PH UNITS (ref 7.35–7.45)
PH BLDA: 7.45 PH UNITS (ref 7.35–7.45)
PH BLDA: 7.45 PH UNITS (ref 7.35–7.45)
PH UR STRIP.AUTO: 5.5 [PH] (ref 5–8)
PH UR STRIP.AUTO: 6 [PH] (ref 5–8)
PH UR STRIP.AUTO: <=5 [PH] (ref 5–8)
PHOSPHATE SERPL-MCNC: 2.1 MG/DL (ref 2.5–4.5)
PHOSPHATE SERPL-MCNC: 2.3 MG/DL (ref 2.5–4.5)
PHOSPHATE SERPL-MCNC: 2.8 MG/DL (ref 2.5–4.5)
PHOSPHATE SERPL-MCNC: 2.9 MG/DL (ref 2.5–4.5)
PHOSPHATE SERPL-MCNC: 3.2 MG/DL (ref 2.5–4.5)
PHOSPHATE SERPL-MCNC: 3.6 MG/DL (ref 2.5–4.5)
PHOSPHATE SERPL-MCNC: 3.6 MG/DL (ref 2.5–4.5)
PHOSPHATE SERPL-MCNC: 3.7 MG/DL (ref 2.5–4.5)
PHOSPHATE SERPL-MCNC: 3.9 MG/DL (ref 2.5–4.5)
PHOSPHATE SERPL-MCNC: 4 MG/DL (ref 2.5–4.5)
PHOSPHATE SERPL-MCNC: 4 MG/DL (ref 2.5–4.5)
PHOSPHATE SERPL-MCNC: 4.1 MG/DL (ref 2.5–4.5)
PHOSPHATE SERPL-MCNC: 4.4 MG/DL (ref 2.5–4.5)
PHOSPHATE SERPL-MCNC: 4.7 MG/DL (ref 2.5–4.5)
PHOSPHATE SERPL-MCNC: 5.5 MG/DL (ref 2.5–4.5)
PLAT MORPH BLD: NORMAL
PLATELET # BLD AUTO: 143 10*3/MM3 (ref 140–450)
PLATELET # BLD AUTO: 155 10*3/MM3 (ref 140–450)
PLATELET # BLD AUTO: 156 10*3/MM3 (ref 140–450)
PLATELET # BLD AUTO: 176 10*3/MM3 (ref 140–450)
PLATELET # BLD AUTO: 178 10*3/MM3 (ref 140–450)
PLATELET # BLD AUTO: 180 10*3/MM3 (ref 140–450)
PLATELET # BLD AUTO: 181 10*3/MM3 (ref 140–450)
PLATELET # BLD AUTO: 186 10*3/MM3 (ref 140–450)
PLATELET # BLD AUTO: 204 10*3/MM3 (ref 140–450)
PLATELET # BLD AUTO: 214 10*3/MM3 (ref 140–450)
PLATELET # BLD AUTO: 218 10*3/MM3 (ref 140–450)
PLATELET # BLD AUTO: 221 10*3/MM3 (ref 140–450)
PLATELET # BLD AUTO: 232 10*3/MM3 (ref 140–450)
PLATELET # BLD AUTO: 233 10*3/MM3 (ref 140–450)
PLATELET # BLD AUTO: 234 10*3/MM3 (ref 140–450)
PLATELET # BLD AUTO: 244 10*3/MM3 (ref 140–450)
PLATELET # BLD AUTO: 249 10*3/MM3 (ref 140–450)
PMV BLD AUTO: 10.1 FL (ref 6–12)
PMV BLD AUTO: 10.2 FL (ref 6–12)
PMV BLD AUTO: 10.3 FL (ref 6–12)
PMV BLD AUTO: 9 FL (ref 6–12)
PMV BLD AUTO: 9.3 FL (ref 6–12)
PMV BLD AUTO: 9.4 FL (ref 6–12)
PMV BLD AUTO: 9.5 FL (ref 6–12)
PMV BLD AUTO: 9.5 FL (ref 6–12)
PMV BLD AUTO: 9.6 FL (ref 6–12)
PMV BLD AUTO: 9.7 FL (ref 6–12)
PMV BLD AUTO: 9.7 FL (ref 6–12)
PMV BLD AUTO: 9.8 FL (ref 6–12)
PMV BLD AUTO: 9.8 FL (ref 6–12)
PMV BLD AUTO: 9.9 FL (ref 6–12)
PO2 BLDA: 105.1 MM HG (ref 80–100)
PO2 BLDA: 109.6 MM HG (ref 80–100)
PO2 BLDA: 111.7 MM HG (ref 80–100)
PO2 BLDA: 144.9 MM HG (ref 80–100)
PO2 BLDA: 145.7 MM HG (ref 80–100)
PO2 BLDA: 56.5 MM HG (ref 80–100)
PO2 BLDA: 59.4 MM HG (ref 80–100)
PO2 BLDA: 78.8 MM HG (ref 80–100)
POTASSIUM SERPL-SCNC: 3.9 MMOL/L (ref 3.5–5.2)
POTASSIUM SERPL-SCNC: 4 MMOL/L (ref 3.5–5.2)
POTASSIUM SERPL-SCNC: 4.1 MMOL/L (ref 3.5–5.2)
POTASSIUM SERPL-SCNC: 4.2 MMOL/L (ref 3.5–5.2)
POTASSIUM SERPL-SCNC: 4.3 MMOL/L (ref 3.5–5.2)
POTASSIUM SERPL-SCNC: 4.3 MMOL/L (ref 3.5–5.2)
POTASSIUM SERPL-SCNC: 4.4 MMOL/L (ref 3.5–5.2)
POTASSIUM SERPL-SCNC: 4.4 MMOL/L (ref 3.5–5.2)
POTASSIUM SERPL-SCNC: 4.5 MMOL/L (ref 3.5–5.2)
POTASSIUM SERPL-SCNC: 4.6 MMOL/L (ref 3.5–5.2)
POTASSIUM SERPL-SCNC: 4.6 MMOL/L (ref 3.5–5.2)
POTASSIUM SERPL-SCNC: 4.7 MMOL/L (ref 3.5–5.2)
POTASSIUM SERPL-SCNC: 5 MMOL/L (ref 3.5–5.2)
POTASSIUM SERPL-SCNC: 5 MMOL/L (ref 3.5–5.2)
POTASSIUM SERPL-SCNC: 5.1 MMOL/L (ref 3.5–5.2)
POTASSIUM SERPL-SCNC: 5.5 MMOL/L (ref 3.5–5.2)
POTASSIUM SERPL-SCNC: 5.6 MMOL/L (ref 3.5–5.2)
POTASSIUM SERPL-SCNC: 5.9 MMOL/L (ref 3.5–5.2)
POTASSIUM SERPL-SCNC: 6 MMOL/L (ref 3.5–5.2)
PROCALCITONIN SERPL-MCNC: 0.12 NG/ML (ref 0–0.25)
PROCALCITONIN SERPL-MCNC: 0.15 NG/ML (ref 0–0.25)
PROT SERPL-MCNC: 4.8 G/DL (ref 6–8.5)
PROT SERPL-MCNC: 4.9 G/DL (ref 6–8.5)
PROT SERPL-MCNC: 5 G/DL (ref 6–8.5)
PROT SERPL-MCNC: 5 G/DL (ref 6–8.5)
PROT SERPL-MCNC: 5.2 G/DL (ref 6–8.5)
PROT SERPL-MCNC: 5.5 G/DL (ref 6–8.5)
PROT SERPL-MCNC: 5.9 G/DL (ref 6–8.5)
PROT SERPL-MCNC: 5.9 G/DL (ref 6–8.5)
PROT SERPL-MCNC: 6 G/DL (ref 6–8.5)
PROT SERPL-MCNC: 6.1 G/DL (ref 6–8.5)
PROT SERPL-MCNC: 6.2 G/DL (ref 6–8.5)
PROT SERPL-MCNC: 6.2 G/DL (ref 6–8.5)
PROT SERPL-MCNC: 6.3 G/DL (ref 6–8.5)
PROT SERPL-MCNC: 6.6 G/DL (ref 6–8.5)
PROT SERPL-MCNC: 6.9 G/DL (ref 6–8.5)
PROT SERPL-MCNC: 7 G/DL (ref 6–8.5)
PROT UR QL STRIP: ABNORMAL
PROT UR QL STRIP: ABNORMAL
PROT UR QL STRIP: NEGATIVE
PROT UR-MCNC: 38 MG/DL
PROT/CREAT UR: 503.3 MG/G CREA (ref 0–200)
PROTHROMBIN TIME: 13.2 SECONDS (ref 11.7–14.2)
PSA SERPL-MCNC: 1.05 NG/ML (ref 0–4)
QT INTERVAL: 328 MS
QT INTERVAL: 379 MS
RBC # BLD AUTO: 3.88 10*6/MM3 (ref 4.14–5.8)
RBC # BLD AUTO: 4.13 10*6/MM3 (ref 4.14–5.8)
RBC # BLD AUTO: 4.36 10*6/MM3 (ref 4.14–5.8)
RBC # BLD AUTO: 4.41 10*6/MM3 (ref 4.14–5.8)
RBC # BLD AUTO: 4.42 10*6/MM3 (ref 4.14–5.8)
RBC # BLD AUTO: 4.45 10*6/MM3 (ref 4.14–5.8)
RBC # BLD AUTO: 4.48 10*6/MM3 (ref 4.14–5.8)
RBC # BLD AUTO: 4.49 10*6/MM3 (ref 4.14–5.8)
RBC # BLD AUTO: 4.54 10*6/MM3 (ref 4.14–5.8)
RBC # BLD AUTO: 4.55 10*6/MM3 (ref 4.14–5.8)
RBC # BLD AUTO: 4.57 10*6/MM3 (ref 4.14–5.8)
RBC # BLD AUTO: 4.61 10*6/MM3 (ref 4.14–5.8)
RBC # BLD AUTO: 4.63 10*6/MM3 (ref 4.14–5.8)
RBC # BLD AUTO: 4.72 10*6/MM3 (ref 4.14–5.8)
RBC # BLD AUTO: 4.74 10*6/MM3 (ref 4.14–5.8)
RBC # BLD AUTO: 4.8 10*6/MM3 (ref 4.14–5.8)
RBC # BLD AUTO: 4.84 10*6/MM3 (ref 4.14–5.8)
RBC # BLD AUTO: 4.85 10*6/MM3 (ref 4.14–5.8)
RBC # BLD AUTO: 5.08 10*6/MM3 (ref 4.14–5.8)
RBC # UR: ABNORMAL /HPF
RBC # UR: ABNORMAL /HPF
RBC MORPH BLD: NORMAL
REF LAB TEST METHOD: ABNORMAL
REF LAB TEST METHOD: ABNORMAL
RHINOVIRUS RNA SPEC NAA+PROBE: NOT DETECTED
RSV RNA NPH QL NAA+NON-PROBE: DETECTED
SAO2 % BLDCOA: 90.7 % (ref 92–99)
SAO2 % BLDCOA: 91.9 % (ref 92–99)
SAO2 % BLDCOA: 92.9 % (ref 92–99)
SAO2 % BLDCOA: 95.7 % (ref 92–99)
SAO2 % BLDCOA: 96 % (ref 92–99)
SAO2 % BLDCOA: 96.2 % (ref 92–99)
SAO2 % BLDCOA: 97.8 % (ref 92–99)
SAO2 % BLDCOA: 98.3 % (ref 92–99)
SARS-COV-2 RNA NPH QL NAA+NON-PROBE: DETECTED
SARS-COV-2 RNA RESP QL NAA+PROBE: DETECTED
SET MECH RESP RATE: 26
SET MECH RESP RATE: 26
SET MECH RESP RATE: 28
SET MECH RESP RATE: 34
SODIUM SERPL-SCNC: 131 MMOL/L (ref 136–145)
SODIUM SERPL-SCNC: 132 MMOL/L (ref 136–145)
SODIUM SERPL-SCNC: 132 MMOL/L (ref 136–145)
SODIUM SERPL-SCNC: 133 MMOL/L (ref 136–145)
SODIUM SERPL-SCNC: 134 MMOL/L (ref 136–145)
SODIUM SERPL-SCNC: 134 MMOL/L (ref 136–145)
SODIUM SERPL-SCNC: 137 MMOL/L (ref 136–145)
SODIUM SERPL-SCNC: 138 MMOL/L (ref 136–145)
SODIUM SERPL-SCNC: 139 MMOL/L (ref 136–145)
SODIUM SERPL-SCNC: 139 MMOL/L (ref 136–145)
SODIUM SERPL-SCNC: 143 MMOL/L (ref 136–145)
SP GR UR STRIP: 1.02 (ref 1–1.03)
SP GR UR STRIP: 1.02 (ref 1–1.03)
SP GR UR STRIP: 1.03 (ref 1–1.03)
SQUAMOUS #/AREA URNS HPF: ABNORMAL /HPF
SQUAMOUS #/AREA URNS HPF: ABNORMAL /HPF
T3FREE SERPL-MCNC: 3.38 PG/ML (ref 2–4.4)
T4 FREE SERPL-MCNC: 1.78 NG/DL (ref 0.93–1.7)
TESTOST SERPL-MCNC: 714 NG/DL (ref 264–916)
TESTOSTERONE.FREE+WB MFR SERPL: 23.7 % (ref 9–46)
TESTOSTERONE.FREE+WB SERPL-MCNC: 169.2 NG/DL (ref 40–250)
TOTAL RATE: 22 BREATHS/MINUTE
TOTAL RATE: 26 BREATHS/MINUTE
TOTAL RATE: 28 BREATHS/MINUTE
TOTAL RATE: 31 BREATHS/MINUTE
TOTAL RATE: 34 BREATHS/MINUTE
TRIGL SERPL-MCNC: 152 MG/DL (ref 0–149)
TRIGL SERPL-MCNC: 185 MG/DL (ref 0–150)
TROPONIN T SERPL-MCNC: <0.01 NG/ML (ref 0–0.03)
TSH SERPL DL<=0.05 MIU/L-ACNC: 0.23 UIU/ML (ref 0.27–4.2)
UROBILINOGEN UR QL STRIP: ABNORMAL
UROBILINOGEN UR QL STRIP: ABNORMAL
UROBILINOGEN UR QL STRIP: NORMAL
VENTILATOR MODE: ABNORMAL
VENTILATOR MODE: ABNORMAL
VENTILATOR MODE: AC
VT ON VENT VENT: 470 ML
VT ON VENT VENT: 500 ML
VT ON VENT VENT: 500 ML
VT ON VENT VENT: 508 ML
VT ON VENT VENT: 550 ML
WBC # BLD AUTO: 10.18 10*3/MM3 (ref 3.4–10.8)
WBC # BLD AUTO: 11.14 10*3/MM3 (ref 3.4–10.8)
WBC # BLD AUTO: 12.4 10*3/MM3 (ref 3.4–10.8)
WBC # BLD AUTO: 13.19 10*3/MM3 (ref 3.4–10.8)
WBC # BLD AUTO: 13.7 10*3/MM3 (ref 3.4–10.8)
WBC # BLD AUTO: 13.82 10*3/MM3 (ref 3.4–10.8)
WBC # BLD AUTO: 14.48 10*3/MM3 (ref 3.4–10.8)
WBC # BLD AUTO: 14.55 10*3/MM3 (ref 3.4–10.8)
WBC # BLD AUTO: 14.71 10*3/MM3 (ref 3.4–10.8)
WBC # BLD AUTO: 14.73 10*3/MM3 (ref 3.4–10.8)
WBC # BLD AUTO: 16.44 10*3/MM3 (ref 3.4–10.8)
WBC # BLD AUTO: 4.44 10*3/MM3 (ref 3.4–10.8)
WBC # BLD AUTO: 5.42 10*3/MM3 (ref 3.4–10.8)
WBC # BLD AUTO: 6.2 10*3/MM3 (ref 3.4–10.8)
WBC # BLD AUTO: 6.47 10*3/MM3 (ref 3.4–10.8)
WBC # BLD AUTO: 6.52 10*3/MM3 (ref 3.4–10.8)
WBC # BLD AUTO: 7.4 10*3/MM3 (ref 3.4–10.8)
WBC # BLD AUTO: 7.91 10*3/MM3 (ref 3.4–10.8)
WBC # BLD AUTO: 8.41 10*3/MM3 (ref 3.4–10.8)
WBC MORPH BLD: NORMAL
WBC UR QL AUTO: ABNORMAL /HPF
WBC UR QL AUTO: ABNORMAL /HPF

## 2021-01-01 PROCEDURE — 63710000001 INSULIN REGULAR HUMAN PER 5 UNITS: Performed by: INTERNAL MEDICINE

## 2021-01-01 PROCEDURE — 25010000002 PHENYLEPHRINE 10 MG/ML SOLUTION: Performed by: INTERNAL MEDICINE

## 2021-01-01 PROCEDURE — 86140 C-REACTIVE PROTEIN: CPT | Performed by: INTERNAL MEDICINE

## 2021-01-01 PROCEDURE — 82962 GLUCOSE BLOOD TEST: CPT

## 2021-01-01 PROCEDURE — 94660 CPAP INITIATION&MGMT: CPT

## 2021-01-01 PROCEDURE — 71046 X-RAY EXAM CHEST 2 VIEWS: CPT

## 2021-01-01 PROCEDURE — 25010000002 ENOXAPARIN PER 10 MG: Performed by: INTERNAL MEDICINE

## 2021-01-01 PROCEDURE — 94799 UNLISTED PULMONARY SVC/PX: CPT

## 2021-01-01 PROCEDURE — 25010000002 DEXAMETHASONE SODIUM PHOSPHATE 10 MG/ML SOLUTION: Performed by: INTERNAL MEDICINE

## 2021-01-01 PROCEDURE — C1751 CATH, INF, PER/CENT/MIDLINE: HCPCS

## 2021-01-01 PROCEDURE — 85025 COMPLETE CBC W/AUTO DIFF WBC: CPT | Performed by: EMERGENCY MEDICINE

## 2021-01-01 PROCEDURE — 94003 VENT MGMT INPAT SUBQ DAY: CPT

## 2021-01-01 PROCEDURE — 97110 THERAPEUTIC EXERCISES: CPT | Performed by: OCCUPATIONAL THERAPIST

## 2021-01-01 PROCEDURE — 71045 X-RAY EXAM CHEST 1 VIEW: CPT

## 2021-01-01 PROCEDURE — 83735 ASSAY OF MAGNESIUM: CPT | Performed by: INTERNAL MEDICINE

## 2021-01-01 PROCEDURE — 80053 COMPREHEN METABOLIC PANEL: CPT | Performed by: INTERNAL MEDICINE

## 2021-01-01 PROCEDURE — 85379 FIBRIN DEGRADATION QUANT: CPT | Performed by: INTERNAL MEDICINE

## 2021-01-01 PROCEDURE — 25010000002 FENTANYL CITRATE (PF) 2500 MCG/50ML SOLUTION: Performed by: INTERNAL MEDICINE

## 2021-01-01 PROCEDURE — 85610 PROTHROMBIN TIME: CPT | Performed by: EMERGENCY MEDICINE

## 2021-01-01 PROCEDURE — 99222 1ST HOSP IP/OBS MODERATE 55: CPT | Performed by: STUDENT IN AN ORGANIZED HEALTH CARE EDUCATION/TRAINING PROGRAM

## 2021-01-01 PROCEDURE — 85025 COMPLETE CBC W/AUTO DIFF WBC: CPT | Performed by: INTERNAL MEDICINE

## 2021-01-01 PROCEDURE — 02HV33Z INSERTION OF INFUSION DEVICE INTO SUPERIOR VENA CAVA, PERCUTANEOUS APPROACH: ICD-10-PCS | Performed by: INTERNAL MEDICINE

## 2021-01-01 PROCEDURE — 84100 ASSAY OF PHOSPHORUS: CPT | Performed by: INTERNAL MEDICINE

## 2021-01-01 PROCEDURE — 0202U NFCT DS 22 TRGT SARS-COV-2: CPT | Performed by: EMERGENCY MEDICINE

## 2021-01-01 PROCEDURE — 73552 X-RAY EXAM OF FEMUR 2/>: CPT

## 2021-01-01 PROCEDURE — 82728 ASSAY OF FERRITIN: CPT | Performed by: INTERNAL MEDICINE

## 2021-01-01 PROCEDURE — 94760 N-INVAS EAR/PLS OXIMETRY 1: CPT

## 2021-01-01 PROCEDURE — 84153 ASSAY OF PSA TOTAL: CPT | Performed by: INTERNAL MEDICINE

## 2021-01-01 PROCEDURE — 25010000002 PROPOFOL 10 MG/ML EMULSION: Performed by: INTERNAL MEDICINE

## 2021-01-01 PROCEDURE — 87040 BLOOD CULTURE FOR BACTERIA: CPT | Performed by: EMERGENCY MEDICINE

## 2021-01-01 PROCEDURE — XW033E5 INTRODUCTION OF REMDESIVIR ANTI-INFECTIVE INTO PERIPHERAL VEIN, PERCUTANEOUS APPROACH, NEW TECHNOLOGY GROUP 5: ICD-10-PCS | Performed by: INTERNAL MEDICINE

## 2021-01-01 PROCEDURE — 83704 LIPOPROTEIN BLD QUAN PART: CPT | Performed by: INTERNAL MEDICINE

## 2021-01-01 PROCEDURE — 84145 PROCALCITONIN (PCT): CPT | Performed by: INTERNAL MEDICINE

## 2021-01-01 PROCEDURE — 36600 WITHDRAWAL OF ARTERIAL BLOOD: CPT

## 2021-01-01 PROCEDURE — 0W9930Z DRAINAGE OF RIGHT PLEURAL CAVITY WITH DRAINAGE DEVICE, PERCUTANEOUS APPROACH: ICD-10-PCS | Performed by: INTERNAL MEDICINE

## 2021-01-01 PROCEDURE — 93010 ELECTROCARDIOGRAM REPORT: CPT | Performed by: INTERNAL MEDICINE

## 2021-01-01 PROCEDURE — 99213 OFFICE O/P EST LOW 20 MIN: CPT | Performed by: INTERNAL MEDICINE

## 2021-01-01 PROCEDURE — 85379 FIBRIN DEGRADATION QUANT: CPT | Performed by: EMERGENCY MEDICINE

## 2021-01-01 PROCEDURE — 84439 ASSAY OF FREE THYROXINE: CPT | Performed by: INTERNAL MEDICINE

## 2021-01-01 PROCEDURE — 80053 COMPREHEN METABOLIC PANEL: CPT | Performed by: EMERGENCY MEDICINE

## 2021-01-01 PROCEDURE — 0BH17EZ INSERTION OF ENDOTRACHEAL AIRWAY INTO TRACHEA, VIA NATURAL OR ARTIFICIAL OPENING: ICD-10-PCS | Performed by: INTERNAL MEDICINE

## 2021-01-01 PROCEDURE — 83615 LACTATE (LD) (LDH) ENZYME: CPT | Performed by: EMERGENCY MEDICINE

## 2021-01-01 PROCEDURE — 25010000002 DEXAMETHASONE SODIUM PHOSPHATE 10 MG/ML SOLUTION: Performed by: EMERGENCY MEDICINE

## 2021-01-01 PROCEDURE — 83605 ASSAY OF LACTIC ACID: CPT | Performed by: EMERGENCY MEDICINE

## 2021-01-01 PROCEDURE — 82306 VITAMIN D 25 HYDROXY: CPT | Performed by: INTERNAL MEDICINE

## 2021-01-01 PROCEDURE — 81001 URINALYSIS AUTO W/SCOPE: CPT | Performed by: INTERNAL MEDICINE

## 2021-01-01 PROCEDURE — 84443 ASSAY THYROID STIM HORMONE: CPT | Performed by: INTERNAL MEDICINE

## 2021-01-01 PROCEDURE — 84145 PROCALCITONIN (PCT): CPT | Performed by: EMERGENCY MEDICINE

## 2021-01-01 PROCEDURE — 97110 THERAPEUTIC EXERCISES: CPT

## 2021-01-01 PROCEDURE — 63710000001 DEXAMETHASONE PER 0.25 MG: Performed by: INTERNAL MEDICINE

## 2021-01-01 PROCEDURE — 36415 COLL VENOUS BLD VENIPUNCTURE: CPT

## 2021-01-01 PROCEDURE — 97535 SELF CARE MNGMENT TRAINING: CPT

## 2021-01-01 PROCEDURE — 97535 SELF CARE MNGMENT TRAINING: CPT | Performed by: OCCUPATIONAL THERAPIST

## 2021-01-01 PROCEDURE — 31500 INSERT EMERGENCY AIRWAY: CPT

## 2021-01-01 PROCEDURE — 85007 BL SMEAR W/DIFF WBC COUNT: CPT | Performed by: INTERNAL MEDICINE

## 2021-01-01 PROCEDURE — 25010000002 DEXAMETHASONE PER 1 MG: Performed by: INTERNAL MEDICINE

## 2021-01-01 PROCEDURE — 99284 EMERGENCY DEPT VISIT MOD MDM: CPT

## 2021-01-01 PROCEDURE — 82550 ASSAY OF CK (CPK): CPT | Performed by: INTERNAL MEDICINE

## 2021-01-01 PROCEDURE — 25010000002 FENTANYL CITRATE (PF) 50 MCG/ML SOLUTION

## 2021-01-01 PROCEDURE — 82803 BLOOD GASES ANY COMBINATION: CPT

## 2021-01-01 PROCEDURE — 82248 BILIRUBIN DIRECT: CPT | Performed by: INTERNAL MEDICINE

## 2021-01-01 PROCEDURE — 85007 BL SMEAR W/DIFF WBC COUNT: CPT | Performed by: EMERGENCY MEDICINE

## 2021-01-01 PROCEDURE — 84481 FREE ASSAY (FT-3): CPT | Performed by: INTERNAL MEDICINE

## 2021-01-01 PROCEDURE — 84484 ASSAY OF TROPONIN QUANT: CPT | Performed by: EMERGENCY MEDICINE

## 2021-01-01 PROCEDURE — 84132 ASSAY OF SERUM POTASSIUM: CPT | Performed by: INTERNAL MEDICINE

## 2021-01-01 PROCEDURE — 25010000002 MORPHINE PER 10 MG

## 2021-01-01 PROCEDURE — 83036 HEMOGLOBIN GLYCOSYLATED A1C: CPT | Performed by: INTERNAL MEDICINE

## 2021-01-01 PROCEDURE — 97530 THERAPEUTIC ACTIVITIES: CPT

## 2021-01-01 PROCEDURE — 99211 OFF/OP EST MAY X REQ PHY/QHP: CPT

## 2021-01-01 PROCEDURE — 82728 ASSAY OF FERRITIN: CPT | Performed by: EMERGENCY MEDICINE

## 2021-01-01 PROCEDURE — 81001 URINALYSIS AUTO W/SCOPE: CPT | Performed by: EMERGENCY MEDICINE

## 2021-01-01 PROCEDURE — 36415 COLL VENOUS BLD VENIPUNCTURE: CPT | Performed by: EMERGENCY MEDICINE

## 2021-01-01 PROCEDURE — 84478 ASSAY OF TRIGLYCERIDES: CPT | Performed by: INTERNAL MEDICINE

## 2021-01-01 PROCEDURE — 85027 COMPLETE CBC AUTOMATED: CPT | Performed by: INTERNAL MEDICINE

## 2021-01-01 PROCEDURE — 93005 ELECTROCARDIOGRAM TRACING: CPT | Performed by: EMERGENCY MEDICINE

## 2021-01-01 PROCEDURE — 5A1945Z RESPIRATORY VENTILATION, 24-96 CONSECUTIVE HOURS: ICD-10-PCS | Performed by: INTERNAL MEDICINE

## 2021-01-01 PROCEDURE — 81003 URINALYSIS AUTO W/O SCOPE: CPT | Performed by: INTERNAL MEDICINE

## 2021-01-01 PROCEDURE — 99215 OFFICE O/P EST HI 40 MIN: CPT | Performed by: INTERNAL MEDICINE

## 2021-01-01 PROCEDURE — XW033H5 INTRODUCTION OF TOCILIZUMAB INTO PERIPHERAL VEIN, PERCUTANEOUS APPROACH, NEW TECHNOLOGY GROUP 5: ICD-10-PCS | Performed by: STUDENT IN AN ORGANIZED HEALTH CARE EDUCATION/TRAINING PROGRAM

## 2021-01-01 PROCEDURE — 82043 UR ALBUMIN QUANTITATIVE: CPT | Performed by: INTERNAL MEDICINE

## 2021-01-01 PROCEDURE — 83880 ASSAY OF NATRIURETIC PEPTIDE: CPT | Performed by: INTERNAL MEDICINE

## 2021-01-01 PROCEDURE — 99291 CRITICAL CARE FIRST HOUR: CPT

## 2021-01-01 PROCEDURE — 97165 OT EVAL LOW COMPLEX 30 MIN: CPT | Performed by: OCCUPATIONAL THERAPIST

## 2021-01-01 PROCEDURE — 70450 CT HEAD/BRAIN W/O DYE: CPT

## 2021-01-01 PROCEDURE — 94002 VENT MGMT INPAT INIT DAY: CPT

## 2021-01-01 PROCEDURE — 82570 ASSAY OF URINE CREATININE: CPT | Performed by: INTERNAL MEDICINE

## 2021-01-01 PROCEDURE — 99233 SBSQ HOSP IP/OBS HIGH 50: CPT | Performed by: STUDENT IN AN ORGANIZED HEALTH CARE EDUCATION/TRAINING PROGRAM

## 2021-01-01 PROCEDURE — 84156 ASSAY OF PROTEIN URINE: CPT | Performed by: INTERNAL MEDICINE

## 2021-01-01 PROCEDURE — 25010000002 ONDANSETRON PER 1 MG: Performed by: INTERNAL MEDICINE

## 2021-01-01 PROCEDURE — 25010000002 FENTANYL CITRATE (PF) 50 MCG/ML SOLUTION: Performed by: INTERNAL MEDICINE

## 2021-01-01 PROCEDURE — G0463 HOSPITAL OUTPT CLINIC VISIT: HCPCS

## 2021-01-01 PROCEDURE — 80061 LIPID PANEL: CPT | Performed by: INTERNAL MEDICINE

## 2021-01-01 PROCEDURE — 97162 PT EVAL MOD COMPLEX 30 MIN: CPT

## 2021-01-01 PROCEDURE — 25010000002 TOCILIZUMAB 400 MG/20ML SOLUTION 20 ML VIAL: Performed by: STUDENT IN AN ORGANIZED HEALTH CARE EDUCATION/TRAINING PROGRAM

## 2021-01-01 PROCEDURE — 84410 TESTOSTERONE BIOAVAILABLE: CPT | Performed by: INTERNAL MEDICINE

## 2021-01-01 PROCEDURE — 99214 OFFICE O/P EST MOD 30 MIN: CPT | Performed by: INTERNAL MEDICINE

## 2021-01-01 PROCEDURE — 93970 EXTREMITY STUDY: CPT

## 2021-01-01 PROCEDURE — 83880 ASSAY OF NATRIURETIC PEPTIDE: CPT | Performed by: EMERGENCY MEDICINE

## 2021-01-01 RX ORDER — SODIUM CHLORIDE 0.9 % (FLUSH) 0.9 %
10 SYRINGE (ML) INJECTION EVERY 12 HOURS SCHEDULED
Status: DISCONTINUED | OUTPATIENT
Start: 2021-01-01 | End: 2021-01-01

## 2021-01-01 RX ORDER — MORPHINE SULFATE 2 MG/ML
4 INJECTION, SOLUTION INTRAMUSCULAR; INTRAVENOUS EVERY 4 HOURS PRN
Status: DISCONTINUED | OUTPATIENT
Start: 2021-01-01 | End: 2021-01-01

## 2021-01-01 RX ORDER — LEVOTHYROXINE SODIUM 0.15 MG/1
TABLET ORAL
Qty: 90 TABLET | Refills: 3 | Status: SHIPPED | OUTPATIENT
Start: 2021-01-01 | End: 2021-01-01 | Stop reason: SDUPTHER

## 2021-01-01 RX ORDER — CISATRACURIUM BESYLATE 2 MG/ML
100 INJECTION, SOLUTION INTRAVENOUS ONCE
Status: COMPLETED | OUTPATIENT
Start: 2021-01-01 | End: 2021-01-01

## 2021-01-01 RX ORDER — SILDENAFIL 100 MG/1
TABLET, FILM COATED ORAL
Qty: 6 TABLET | Refills: 11 | Status: SHIPPED | OUTPATIENT
Start: 2021-01-01

## 2021-01-01 RX ORDER — SODIUM CHLORIDE 0.9 % (FLUSH) 0.9 %
20 SYRINGE (ML) INJECTION AS NEEDED
Status: DISCONTINUED | OUTPATIENT
Start: 2021-01-01 | End: 2021-01-01

## 2021-01-01 RX ORDER — PHENYLEPHRINE HCL IN 0.9% NACL 0.5 MG/5ML
.5-3 SYRINGE (ML) INTRAVENOUS
Status: DISCONTINUED | OUTPATIENT
Start: 2021-01-01 | End: 2021-01-01

## 2021-01-01 RX ORDER — DEXAMETHASONE SODIUM PHOSPHATE 10 MG/ML
6 INJECTION, SOLUTION INTRAMUSCULAR; INTRAVENOUS 2 TIMES DAILY
Status: DISCONTINUED | OUTPATIENT
Start: 2021-01-01 | End: 2021-01-01

## 2021-01-01 RX ORDER — SODIUM CHLORIDE 0.9 % (FLUSH) 0.9 %
10 SYRINGE (ML) INJECTION AS NEEDED
Status: DISCONTINUED | OUTPATIENT
Start: 2021-01-01 | End: 2021-09-06 | Stop reason: HOSPADM

## 2021-01-01 RX ORDER — DEXAMETHASONE SODIUM PHOSPHATE 10 MG/ML
6 INJECTION, SOLUTION INTRAMUSCULAR; INTRAVENOUS ONCE
Status: COMPLETED | OUTPATIENT
Start: 2021-01-01 | End: 2021-01-01

## 2021-01-01 RX ORDER — FENTANYL CITRATE 50 UG/ML
100 INJECTION, SOLUTION INTRAMUSCULAR; INTRAVENOUS ONCE
Status: COMPLETED | OUTPATIENT
Start: 2021-01-01 | End: 2021-01-01

## 2021-01-01 RX ORDER — FAMOTIDINE 20 MG/1
20 TABLET, FILM COATED ORAL DAILY
Status: DISCONTINUED | OUTPATIENT
Start: 2021-09-06 | End: 2021-01-01

## 2021-01-01 RX ORDER — FENTANYL CITRATE 50 UG/ML
200 INJECTION, SOLUTION INTRAMUSCULAR; INTRAVENOUS ONCE
Status: DISCONTINUED | OUTPATIENT
Start: 2021-01-01 | End: 2021-09-06 | Stop reason: HOSPADM

## 2021-01-01 RX ORDER — SODIUM CHLORIDE 0.9 % (FLUSH) 0.9 %
10 SYRINGE (ML) INJECTION AS NEEDED
Status: DISCONTINUED | OUTPATIENT
Start: 2021-01-01 | End: 2021-01-01

## 2021-01-01 RX ORDER — TESTOSTERONE 16.2 MG/G
GEL TRANSDERMAL
Qty: 450 G | Refills: 1 | Status: SHIPPED | OUTPATIENT
Start: 2021-01-01 | End: 2021-01-01 | Stop reason: SDUPTHER

## 2021-01-01 RX ORDER — DEXAMETHASONE SODIUM PHOSPHATE 10 MG/ML
10 INJECTION INTRAMUSCULAR; INTRAVENOUS EVERY 12 HOURS
Status: DISCONTINUED | OUTPATIENT
Start: 2021-01-01 | End: 2021-01-01

## 2021-01-01 RX ORDER — ONDANSETRON HYDROCHLORIDE 8 MG/1
TABLET, FILM COATED ORAL
Qty: 30 TABLET | Refills: 1 | Status: SHIPPED | OUTPATIENT
Start: 2021-01-01

## 2021-01-01 RX ORDER — LEVOTHYROXINE SODIUM 0.15 MG/1
TABLET ORAL
Qty: 90 TABLET | Refills: 3
Start: 2021-01-01

## 2021-01-01 RX ORDER — FENTANYL CITRATE 50 UG/ML
INJECTION, SOLUTION INTRAMUSCULAR; INTRAVENOUS
Status: COMPLETED
Start: 2021-01-01 | End: 2021-01-01

## 2021-01-01 RX ORDER — TAMSULOSIN HYDROCHLORIDE 0.4 MG/1
CAPSULE ORAL
Qty: 90 CAPSULE | Refills: 1 | Status: SHIPPED | OUTPATIENT
Start: 2021-01-01

## 2021-01-01 RX ORDER — EZETIMIBE 10 MG/1
TABLET ORAL
Qty: 90 TABLET | Refills: 3
Start: 2021-01-01

## 2021-01-01 RX ORDER — TAMSULOSIN HYDROCHLORIDE 0.4 MG/1
0.4 CAPSULE ORAL DAILY
Status: DISCONTINUED | OUTPATIENT
Start: 2021-01-01 | End: 2021-01-01

## 2021-01-01 RX ORDER — HYDROXYZINE 50 MG/1
100 TABLET, FILM COATED ORAL NIGHTLY PRN
Status: DISCONTINUED | OUTPATIENT
Start: 2021-01-01 | End: 2021-01-01

## 2021-01-01 RX ORDER — PREDNISONE 10 MG/1
TABLET ORAL
Qty: 56 TABLET | Refills: 0 | Status: SHIPPED | OUTPATIENT
Start: 2021-01-01

## 2021-01-01 RX ORDER — UREA 10 %
3 LOTION (ML) TOPICAL NIGHTLY
Status: DISCONTINUED | OUTPATIENT
Start: 2021-01-01 | End: 2021-01-01

## 2021-01-01 RX ORDER — LEVOTHYROXINE SODIUM 0.15 MG/1
150 TABLET ORAL
Status: DISCONTINUED | OUTPATIENT
Start: 2021-01-01 | End: 2021-01-01

## 2021-01-01 RX ORDER — FLUTICASONE PROPIONATE 50 MCG
2 SPRAY, SUSPENSION (ML) NASAL DAILY
Status: DISCONTINUED | OUTPATIENT
Start: 2021-01-01 | End: 2021-01-01

## 2021-01-01 RX ORDER — BUMETANIDE 0.25 MG/ML
4 INJECTION INTRAMUSCULAR; INTRAVENOUS ONCE
Status: COMPLETED | OUTPATIENT
Start: 2021-01-01 | End: 2021-01-01

## 2021-01-01 RX ORDER — TESTOSTERONE 16.2 MG/G
GEL TRANSDERMAL
Qty: 450 G | Refills: 1 | Status: SHIPPED | OUTPATIENT
Start: 2021-01-01

## 2021-01-01 RX ORDER — MORPHINE SULFATE 2 MG/ML
INJECTION, SOLUTION INTRAMUSCULAR; INTRAVENOUS
Status: COMPLETED
Start: 2021-01-01 | End: 2021-01-01

## 2021-01-01 RX ORDER — DEXTROSE MONOHYDRATE 25 G/50ML
25 INJECTION, SOLUTION INTRAVENOUS
Status: DISCONTINUED | OUTPATIENT
Start: 2021-01-01 | End: 2021-01-01

## 2021-01-01 RX ORDER — METHYLPREDNISOLONE SODIUM SUCCINATE 125 MG/2ML
125 INJECTION, POWDER, LYOPHILIZED, FOR SOLUTION INTRAMUSCULAR; INTRAVENOUS AS NEEDED
Status: CANCELLED | OUTPATIENT
Start: 2021-01-01

## 2021-01-01 RX ORDER — ACETAMINOPHEN 500 MG
1000 TABLET ORAL ONCE
Status: COMPLETED | OUTPATIENT
Start: 2021-01-01 | End: 2021-01-01

## 2021-01-01 RX ORDER — SODIUM CHLORIDE 9 MG/ML
30 INJECTION, SOLUTION INTRAVENOUS ONCE
Status: CANCELLED | OUTPATIENT
Start: 2021-01-01

## 2021-01-01 RX ORDER — POLYETHYLENE GLYCOL 3350 17 G/17G
17 POWDER, FOR SOLUTION ORAL DAILY PRN
Status: DISCONTINUED | OUTPATIENT
Start: 2021-01-01 | End: 2021-01-01

## 2021-01-01 RX ORDER — FAMOTIDINE 20 MG/1
20 TABLET, FILM COATED ORAL
Status: DISCONTINUED | OUTPATIENT
Start: 2021-01-01 | End: 2021-01-01

## 2021-01-01 RX ORDER — NICOTINE POLACRILEX 4 MG
15 LOZENGE BUCCAL
Status: DISCONTINUED | OUTPATIENT
Start: 2021-01-01 | End: 2021-01-01

## 2021-01-01 RX ORDER — DEXAMETHASONE SODIUM PHOSPHATE 10 MG/ML
6 INJECTION, SOLUTION INTRAMUSCULAR; INTRAVENOUS
Status: DISCONTINUED | OUTPATIENT
Start: 2021-01-01 | End: 2021-01-01

## 2021-01-01 RX ORDER — SODIUM CHLORIDE 9 MG/ML
125 INJECTION, SOLUTION INTRAVENOUS CONTINUOUS
Status: DISCONTINUED | OUTPATIENT
Start: 2021-01-01 | End: 2021-01-01

## 2021-01-01 RX ORDER — BISACODYL 10 MG
10 SUPPOSITORY, RECTAL RECTAL DAILY PRN
Status: DISCONTINUED | OUTPATIENT
Start: 2021-01-01 | End: 2021-01-01

## 2021-01-01 RX ORDER — SIMVASTATIN 40 MG
TABLET ORAL
Qty: 90 TABLET | Refills: 3
Start: 2021-01-01

## 2021-01-01 RX ORDER — ASPIRIN 81 MG/1
81 TABLET ORAL DAILY
Status: DISCONTINUED | OUTPATIENT
Start: 2021-01-01 | End: 2021-01-01

## 2021-01-01 RX ORDER — AMOXICILLIN 250 MG
2 CAPSULE ORAL 2 TIMES DAILY
Status: DISCONTINUED | OUTPATIENT
Start: 2021-01-01 | End: 2021-01-01

## 2021-01-01 RX ORDER — BISACODYL 5 MG/1
5 TABLET, DELAYED RELEASE ORAL DAILY PRN
Status: DISCONTINUED | OUTPATIENT
Start: 2021-01-01 | End: 2021-01-01

## 2021-01-01 RX ORDER — ONDANSETRON 2 MG/ML
4 INJECTION INTRAMUSCULAR; INTRAVENOUS EVERY 6 HOURS PRN
Status: DISCONTINUED | OUTPATIENT
Start: 2021-01-01 | End: 2021-09-06 | Stop reason: HOSPADM

## 2021-01-01 RX ORDER — MIDAZOLAM HYDROCHLORIDE 1 MG/ML
5 INJECTION INTRAMUSCULAR; INTRAVENOUS ONCE
Status: DISCONTINUED | OUTPATIENT
Start: 2021-01-01 | End: 2021-09-06 | Stop reason: HOSPADM

## 2021-01-01 RX ORDER — DEXAMETHASONE SODIUM PHOSPHATE 10 MG/ML
6 INJECTION INTRAMUSCULAR; INTRAVENOUS EVERY 12 HOURS
Status: DISCONTINUED | OUTPATIENT
Start: 2021-01-01 | End: 2021-01-01

## 2021-01-01 RX ORDER — IVERMECTIN 3 MG/1
TABLET ORAL
Qty: 1 TABLET | Refills: 0 | Status: SHIPPED | OUTPATIENT
Start: 2021-01-01

## 2021-01-01 RX ORDER — FENTANYL CITRATE-0.9 % NACL/PF 5 MCG/ML
50-300 PLASTIC BAG, INJECTION (ML) INTRAVENOUS
Status: DISCONTINUED | OUTPATIENT
Start: 2021-01-01 | End: 2021-01-01

## 2021-01-01 RX ORDER — DOXYCYCLINE HYCLATE 100 MG/1
CAPSULE ORAL
Qty: 10 CAPSULE | Refills: 0 | Status: SHIPPED | OUTPATIENT
Start: 2021-01-01

## 2021-01-01 RX ORDER — SODIUM CHLORIDE 9 MG/ML
100 INJECTION, SOLUTION INTRAVENOUS CONTINUOUS
Status: DISCONTINUED | OUTPATIENT
Start: 2021-01-01 | End: 2021-01-01

## 2021-01-01 RX ORDER — DIPHENHYDRAMINE HYDROCHLORIDE 50 MG/ML
50 INJECTION INTRAMUSCULAR; INTRAVENOUS AS NEEDED
Status: CANCELLED | OUTPATIENT
Start: 2021-01-01

## 2021-01-01 RX ORDER — FENTANYL CITRATE 50 UG/ML
100 INJECTION, SOLUTION INTRAMUSCULAR; INTRAVENOUS
Status: DISCONTINUED | OUTPATIENT
Start: 2021-01-01 | End: 2021-09-06 | Stop reason: HOSPADM

## 2021-01-01 RX ORDER — EPINEPHRINE 1 MG/ML
0.3 INJECTION, SOLUTION, CONCENTRATE INTRAVENOUS AS NEEDED
Status: CANCELLED | OUTPATIENT
Start: 2021-01-01

## 2021-01-01 RX ORDER — SODIUM POLYSTYRENE SULFONATE 15 G/60ML
30 SUSPENSION ORAL; RECTAL ONCE
Status: COMPLETED | OUTPATIENT
Start: 2021-01-01 | End: 2021-01-01

## 2021-01-01 RX ORDER — CHOLECALCIFEROL (VITAMIN D3) 125 MCG
10 CAPSULE ORAL NIGHTLY
Status: DISCONTINUED | OUTPATIENT
Start: 2021-01-01 | End: 2021-01-01

## 2021-01-01 RX ORDER — HYDROXYZINE 50 MG/1
50 TABLET, FILM COATED ORAL NIGHTLY PRN
Status: DISCONTINUED | OUTPATIENT
Start: 2021-01-01 | End: 2021-01-01

## 2021-01-01 RX ADMIN — SODIUM CHLORIDE 500 ML: 9 INJECTION, SOLUTION INTRAVENOUS at 18:16

## 2021-01-01 RX ADMIN — INSULIN HUMAN 2 UNITS: 100 INJECTION, SOLUTION PARENTERAL at 18:18

## 2021-01-01 RX ADMIN — CISATRACURIUM BESYLATE 4 MCG/KG/MIN: 10 INJECTION, SOLUTION INTRAVENOUS at 10:04

## 2021-01-01 RX ADMIN — DEXAMETHASONE 6 MG: 4 TABLET ORAL at 08:39

## 2021-01-01 RX ADMIN — PROPOFOL 50 MCG/KG/MIN: 10 INJECTION, EMULSION INTRAVENOUS at 20:29

## 2021-01-01 RX ADMIN — Medication 10 MG: at 20:01

## 2021-01-01 RX ADMIN — SODIUM CHLORIDE, PRESERVATIVE FREE 10 ML: 5 INJECTION INTRAVENOUS at 21:37

## 2021-01-01 RX ADMIN — ASPIRIN 81 MG: 81 TABLET, COATED ORAL at 08:48

## 2021-01-01 RX ADMIN — TAMSULOSIN HYDROCHLORIDE 0.4 MG: 0.4 CAPSULE ORAL at 08:39

## 2021-01-01 RX ADMIN — INSULIN HUMAN 2 UNITS: 100 INJECTION, SOLUTION PARENTERAL at 23:36

## 2021-01-01 RX ADMIN — FLUTICASONE PROPIONATE 2 SPRAY: 50 SPRAY, METERED NASAL at 08:13

## 2021-01-01 RX ADMIN — ASPIRIN 81 MG: 81 TABLET, COATED ORAL at 08:02

## 2021-01-01 RX ADMIN — MINERAL OIL, AND WHITE PETROLATUM: 425; 573 OINTMENT OPHTHALMIC at 15:52

## 2021-01-01 RX ADMIN — LEVOTHYROXINE SODIUM 150 MCG: 0.15 TABLET ORAL at 05:01

## 2021-01-01 RX ADMIN — FLUTICASONE PROPIONATE 2 SPRAY: 50 SPRAY, METERED NASAL at 12:55

## 2021-01-01 RX ADMIN — SODIUM CHLORIDE, PRESERVATIVE FREE 10 ML: 5 INJECTION INTRAVENOUS at 08:37

## 2021-01-01 RX ADMIN — ENOXAPARIN SODIUM 50 MG: 60 INJECTION, SOLUTION INTRAVENOUS; SUBCUTANEOUS at 08:44

## 2021-01-01 RX ADMIN — ENOXAPARIN SODIUM 40 MG: 100 INJECTION, SOLUTION INTRAVENOUS; SUBCUTANEOUS at 08:12

## 2021-01-01 RX ADMIN — SODIUM CHLORIDE 100 ML/HR: 9 INJECTION, SOLUTION INTRAVENOUS at 10:54

## 2021-01-01 RX ADMIN — Medication 10 MG: at 21:01

## 2021-01-01 RX ADMIN — FENTANYL CITRATE 100 MCG: 0.05 INJECTION, SOLUTION INTRAMUSCULAR; INTRAVENOUS at 12:13

## 2021-01-01 RX ADMIN — LEVOTHYROXINE SODIUM 150 MCG: 0.15 TABLET ORAL at 06:56

## 2021-01-01 RX ADMIN — DEXAMETHASONE SODIUM PHOSPHATE 6 MG: 10 INJECTION, SOLUTION INTRAMUSCULAR; INTRAVENOUS at 21:10

## 2021-01-01 RX ADMIN — INSULIN HUMAN 2 UNITS: 100 INJECTION, SOLUTION PARENTERAL at 12:29

## 2021-01-01 RX ADMIN — PROPOFOL 50 MCG/KG/MIN: 10 INJECTION, EMULSION INTRAVENOUS at 04:37

## 2021-01-01 RX ADMIN — REMDESIVIR 100 MG: 100 INJECTION, POWDER, LYOPHILIZED, FOR SOLUTION INTRAVENOUS at 20:36

## 2021-01-01 RX ADMIN — SODIUM CHLORIDE, PRESERVATIVE FREE 10 ML: 5 INJECTION INTRAVENOUS at 21:01

## 2021-01-01 RX ADMIN — PROPOFOL 50 MCG/KG/MIN: 10 INJECTION, EMULSION INTRAVENOUS at 17:34

## 2021-01-01 RX ADMIN — FAMOTIDINE 20 MG: 20 TABLET, FILM COATED ORAL at 21:00

## 2021-01-01 RX ADMIN — LEVOTHYROXINE SODIUM 150 MCG: 0.15 TABLET ORAL at 05:10

## 2021-01-01 RX ADMIN — PROPOFOL 30 MCG/KG/MIN: 10 INJECTION, EMULSION INTRAVENOUS at 10:11

## 2021-01-01 RX ADMIN — HYDROCODONE BITARTRATE AND HOMATROPINE METHYLBROMIDE 5 ML: 5; 1.5 SOLUTION ORAL at 02:15

## 2021-01-01 RX ADMIN — ASPIRIN 81 MG: 81 TABLET, COATED ORAL at 08:39

## 2021-01-01 RX ADMIN — LEVOTHYROXINE SODIUM 150 MCG: 0.15 TABLET ORAL at 06:15

## 2021-01-01 RX ADMIN — DEXAMETHASONE SODIUM PHOSPHATE 6 MG: 10 INJECTION, SOLUTION INTRAMUSCULAR; INTRAVENOUS at 08:39

## 2021-01-01 RX ADMIN — SODIUM CHLORIDE, PRESERVATIVE FREE 10 ML: 5 INJECTION INTRAVENOUS at 15:13

## 2021-01-01 RX ADMIN — SODIUM CHLORIDE, PRESERVATIVE FREE 10 ML: 5 INJECTION INTRAVENOUS at 08:28

## 2021-01-01 RX ADMIN — FLUTICASONE PROPIONATE 2 SPRAY: 50 SPRAY, METERED NASAL at 08:36

## 2021-01-01 RX ADMIN — PROPOFOL 50 MCG/KG/MIN: 10 INJECTION, EMULSION INTRAVENOUS at 17:06

## 2021-01-01 RX ADMIN — PHENYLEPHRINE HYDROCHLORIDE 2 MCG/KG/MIN: 10 INJECTION INTRAVENOUS at 10:02

## 2021-01-01 RX ADMIN — SODIUM CHLORIDE, PRESERVATIVE FREE 10 ML: 5 INJECTION INTRAVENOUS at 08:05

## 2021-01-01 RX ADMIN — FAMOTIDINE 20 MG: 20 TABLET, FILM COATED ORAL at 06:46

## 2021-01-01 RX ADMIN — MINERAL OIL, AND WHITE PETROLATUM: 425; 573 OINTMENT OPHTHALMIC at 17:25

## 2021-01-01 RX ADMIN — FENTANYL CITRATE 300 MCG/HR: 50 INJECTION, SOLUTION INTRAMUSCULAR; INTRAVENOUS at 08:17

## 2021-01-01 RX ADMIN — MINERAL OIL, AND WHITE PETROLATUM: 425; 573 OINTMENT OPHTHALMIC at 21:37

## 2021-01-01 RX ADMIN — FAMOTIDINE 20 MG: 20 TABLET, FILM COATED ORAL at 12:48

## 2021-01-01 RX ADMIN — TAMSULOSIN HYDROCHLORIDE 0.4 MG: 0.4 CAPSULE ORAL at 08:05

## 2021-01-01 RX ADMIN — SODIUM CHLORIDE, PRESERVATIVE FREE 10 ML: 5 INJECTION INTRAVENOUS at 20:01

## 2021-01-01 RX ADMIN — SODIUM CHLORIDE, PRESERVATIVE FREE 10 ML: 5 INJECTION INTRAVENOUS at 20:31

## 2021-01-01 RX ADMIN — HYDROCODONE BITARTRATE AND HOMATROPINE METHYLBROMIDE 5 ML: 5; 1.5 SOLUTION ORAL at 16:06

## 2021-01-01 RX ADMIN — PROPOFOL 30 MCG/KG/MIN: 10 INJECTION, EMULSION INTRAVENOUS at 14:58

## 2021-01-01 RX ADMIN — MINERAL OIL, AND WHITE PETROLATUM: 425; 573 OINTMENT OPHTHALMIC at 08:19

## 2021-01-01 RX ADMIN — FENTANYL CITRATE 300 MCG/HR: 50 INJECTION, SOLUTION INTRAMUSCULAR; INTRAVENOUS at 03:43

## 2021-01-01 RX ADMIN — HYDROXYZINE HYDROCHLORIDE 75 MG: 50 TABLET, FILM COATED ORAL at 20:05

## 2021-01-01 RX ADMIN — DOCUSATE SODIUM 50MG AND SENNOSIDES 8.6MG 2 TABLET: 8.6; 5 TABLET, FILM COATED ORAL at 08:05

## 2021-01-01 RX ADMIN — FENTANYL CITRATE 200 MCG/HR: 50 INJECTION, SOLUTION INTRAMUSCULAR; INTRAVENOUS at 15:00

## 2021-01-01 RX ADMIN — FENTANYL CITRATE 100 MCG: 0.05 INJECTION, SOLUTION INTRAMUSCULAR; INTRAVENOUS at 11:33

## 2021-01-01 RX ADMIN — PHENYLEPHRINE HYDROCHLORIDE 1.6 MCG/KG/MIN: 10 INJECTION INTRAVENOUS at 21:06

## 2021-01-01 RX ADMIN — SODIUM CHLORIDE, PRESERVATIVE FREE 10 ML: 5 INJECTION INTRAVENOUS at 21:11

## 2021-01-01 RX ADMIN — HYDROXYZINE HYDROCHLORIDE 75 MG: 50 TABLET, FILM COATED ORAL at 20:29

## 2021-01-01 RX ADMIN — SODIUM CHLORIDE 100 ML/HR: 9 INJECTION, SOLUTION INTRAVENOUS at 04:17

## 2021-01-01 RX ADMIN — ENOXAPARIN SODIUM 40 MG: 100 INJECTION, SOLUTION INTRAVENOUS; SUBCUTANEOUS at 20:29

## 2021-01-01 RX ADMIN — ENOXAPARIN SODIUM 40 MG: 100 INJECTION, SOLUTION INTRAVENOUS; SUBCUTANEOUS at 20:55

## 2021-01-01 RX ADMIN — PHENYLEPHRINE HYDROCHLORIDE 2.3 MCG/KG/MIN: 10 INJECTION INTRAVENOUS at 23:58

## 2021-01-01 RX ADMIN — DOCUSATE SODIUM 50MG AND SENNOSIDES 8.6MG 2 TABLET: 8.6; 5 TABLET, FILM COATED ORAL at 08:02

## 2021-01-01 RX ADMIN — ENOXAPARIN SODIUM 90 MG: 100 INJECTION, SOLUTION INTRAVENOUS; SUBCUTANEOUS at 21:10

## 2021-01-01 RX ADMIN — SODIUM CHLORIDE, PRESERVATIVE FREE 10 ML: 5 INJECTION INTRAVENOUS at 21:07

## 2021-01-01 RX ADMIN — SODIUM CHLORIDE, PRESERVATIVE FREE 10 ML: 5 INJECTION INTRAVENOUS at 08:18

## 2021-01-01 RX ADMIN — PHENYLEPHRINE HYDROCHLORIDE 1.6 MCG/KG/MIN: 10 INJECTION INTRAVENOUS at 03:40

## 2021-01-01 RX ADMIN — SODIUM CHLORIDE, PRESERVATIVE FREE 10 ML: 5 INJECTION INTRAVENOUS at 22:16

## 2021-01-01 RX ADMIN — FLUTICASONE PROPIONATE 2 SPRAY: 50 SPRAY, METERED NASAL at 08:17

## 2021-01-01 RX ADMIN — SODIUM CHLORIDE 100 ML/HR: 9 INJECTION, SOLUTION INTRAVENOUS at 17:01

## 2021-01-01 RX ADMIN — DOCUSATE SODIUM 50MG AND SENNOSIDES 8.6MG 2 TABLET: 8.6; 5 TABLET, FILM COATED ORAL at 20:30

## 2021-01-01 RX ADMIN — ENOXAPARIN SODIUM 40 MG: 100 INJECTION, SOLUTION INTRAVENOUS; SUBCUTANEOUS at 20:04

## 2021-01-01 RX ADMIN — PHENYLEPHRINE HYDROCHLORIDE 3 MCG/KG/MIN: 10 INJECTION INTRAVENOUS at 15:50

## 2021-01-01 RX ADMIN — ASPIRIN 81 MG: 81 TABLET, COATED ORAL at 08:44

## 2021-01-01 RX ADMIN — ENOXAPARIN SODIUM 40 MG: 40 INJECTION SUBCUTANEOUS at 11:01

## 2021-01-01 RX ADMIN — PROPOFOL 45 MCG/KG/MIN: 10 INJECTION, EMULSION INTRAVENOUS at 23:57

## 2021-01-01 RX ADMIN — DOCUSATE SODIUM 50MG AND SENNOSIDES 8.6MG 2 TABLET: 8.6; 5 TABLET, FILM COATED ORAL at 03:30

## 2021-01-01 RX ADMIN — DOCUSATE SODIUM 50MG AND SENNOSIDES 8.6MG 2 TABLET: 8.6; 5 TABLET, FILM COATED ORAL at 20:59

## 2021-01-01 RX ADMIN — DEXAMETHASONE SODIUM PHOSPHATE 6 MG: 10 INJECTION, SOLUTION INTRAMUSCULAR; INTRAVENOUS at 20:30

## 2021-01-01 RX ADMIN — DEXAMETHASONE SODIUM PHOSPHATE 10 MG: 10 INJECTION INTRAMUSCULAR; INTRAVENOUS at 08:41

## 2021-01-01 RX ADMIN — DEXAMETHASONE SODIUM PHOSPHATE 6 MG: 10 INJECTION, SOLUTION INTRAMUSCULAR; INTRAVENOUS at 08:04

## 2021-01-01 RX ADMIN — INSULIN HUMAN 2 UNITS: 100 INJECTION, SOLUTION PARENTERAL at 12:22

## 2021-01-01 RX ADMIN — ENOXAPARIN SODIUM 40 MG: 40 INJECTION SUBCUTANEOUS at 21:46

## 2021-01-01 RX ADMIN — ASPIRIN 81 MG: 81 TABLET, COATED ORAL at 08:28

## 2021-01-01 RX ADMIN — DEXAMETHASONE SODIUM PHOSPHATE 6 MG: 10 INJECTION, SOLUTION INTRAMUSCULAR; INTRAVENOUS at 20:34

## 2021-01-01 RX ADMIN — FENTANYL CITRATE 100 MCG: 0.05 INJECTION, SOLUTION INTRAMUSCULAR; INTRAVENOUS at 11:39

## 2021-01-01 RX ADMIN — SODIUM CHLORIDE, PRESERVATIVE FREE 10 ML: 5 INJECTION INTRAVENOUS at 08:40

## 2021-01-01 RX ADMIN — SODIUM CHLORIDE, PRESERVATIVE FREE 10 ML: 5 INJECTION INTRAVENOUS at 08:14

## 2021-01-01 RX ADMIN — LEVOTHYROXINE SODIUM 150 MCG: 0.15 TABLET ORAL at 06:10

## 2021-01-01 RX ADMIN — Medication 10 MG: at 20:55

## 2021-01-01 RX ADMIN — TAMSULOSIN HYDROCHLORIDE 0.4 MG: 0.4 CAPSULE ORAL at 08:28

## 2021-01-01 RX ADMIN — FAMOTIDINE 20 MG: 20 TABLET, FILM COATED ORAL at 06:34

## 2021-01-01 RX ADMIN — MINERAL OIL, AND WHITE PETROLATUM: 425; 573 OINTMENT OPHTHALMIC at 00:51

## 2021-01-01 RX ADMIN — ENOXAPARIN SODIUM 50 MG: 60 INJECTION, SOLUTION INTRAVENOUS; SUBCUTANEOUS at 20:01

## 2021-01-01 RX ADMIN — TAMSULOSIN HYDROCHLORIDE 0.4 MG: 0.4 CAPSULE ORAL at 08:45

## 2021-01-01 RX ADMIN — MINERAL OIL, AND WHITE PETROLATUM: 425; 573 OINTMENT OPHTHALMIC at 12:30

## 2021-01-01 RX ADMIN — MINERAL OIL, AND WHITE PETROLATUM: 425; 573 OINTMENT OPHTHALMIC at 14:13

## 2021-01-01 RX ADMIN — PROPOFOL 50 MCG/KG/MIN: 10 INJECTION, EMULSION INTRAVENOUS at 12:55

## 2021-01-01 RX ADMIN — FAMOTIDINE 20 MG: 20 TABLET, FILM COATED ORAL at 20:36

## 2021-01-01 RX ADMIN — FAMOTIDINE 20 MG: 20 TABLET, FILM COATED ORAL at 06:32

## 2021-01-01 RX ADMIN — DEXAMETHASONE SODIUM PHOSPHATE 6 MG: 10 INJECTION, SOLUTION INTRAMUSCULAR; INTRAVENOUS at 08:45

## 2021-01-01 RX ADMIN — DEXAMETHASONE SODIUM PHOSPHATE 6 MG: 10 INJECTION, SOLUTION INTRAMUSCULAR; INTRAVENOUS at 19:13

## 2021-01-01 RX ADMIN — SODIUM CHLORIDE, PRESERVATIVE FREE 10 ML: 5 INJECTION INTRAVENOUS at 20:51

## 2021-01-01 RX ADMIN — FENTANYL CITRATE 300 MCG/HR: 50 INJECTION, SOLUTION INTRAMUSCULAR; INTRAVENOUS at 17:05

## 2021-01-01 RX ADMIN — PHENYLEPHRINE HYDROCHLORIDE 1.8 MCG/KG/MIN: 10 INJECTION INTRAVENOUS at 15:12

## 2021-01-01 RX ADMIN — ENOXAPARIN SODIUM 50 MG: 60 INJECTION, SOLUTION INTRAVENOUS; SUBCUTANEOUS at 20:36

## 2021-01-01 RX ADMIN — MINERAL OIL, AND WHITE PETROLATUM: 425; 573 OINTMENT OPHTHALMIC at 03:40

## 2021-01-01 RX ADMIN — DEXAMETHASONE SODIUM PHOSPHATE 6 MG: 10 INJECTION, SOLUTION INTRAMUSCULAR; INTRAVENOUS at 08:02

## 2021-01-01 RX ADMIN — Medication 10 MG: at 20:59

## 2021-01-01 RX ADMIN — ENOXAPARIN SODIUM 50 MG: 60 INJECTION, SOLUTION INTRAVENOUS; SUBCUTANEOUS at 08:13

## 2021-01-01 RX ADMIN — SODIUM CHLORIDE, PRESERVATIVE FREE 10 ML: 5 INJECTION INTRAVENOUS at 09:52

## 2021-01-01 RX ADMIN — ENOXAPARIN SODIUM 40 MG: 100 INJECTION, SOLUTION INTRAVENOUS; SUBCUTANEOUS at 21:11

## 2021-01-01 RX ADMIN — PROPOFOL 50 MCG/KG/MIN: 10 INJECTION, EMULSION INTRAVENOUS at 21:06

## 2021-01-01 RX ADMIN — FAMOTIDINE 20 MG: 20 TABLET, FILM COATED ORAL at 18:56

## 2021-01-01 RX ADMIN — ENOXAPARIN SODIUM 40 MG: 100 INJECTION, SOLUTION INTRAVENOUS; SUBCUTANEOUS at 08:45

## 2021-01-01 RX ADMIN — DEXAMETHASONE SODIUM PHOSPHATE 6 MG: 10 INJECTION, SOLUTION INTRAMUSCULAR; INTRAVENOUS at 00:40

## 2021-01-01 RX ADMIN — FAMOTIDINE 20 MG: 20 TABLET, FILM COATED ORAL at 06:55

## 2021-01-01 RX ADMIN — FAMOTIDINE 20 MG: 20 TABLET, FILM COATED ORAL at 08:28

## 2021-01-01 RX ADMIN — DEXAMETHASONE SODIUM PHOSPHATE 6 MG: 10 INJECTION, SOLUTION INTRAMUSCULAR; INTRAVENOUS at 09:21

## 2021-01-01 RX ADMIN — SODIUM CHLORIDE 100 ML/HR: 9 INJECTION, SOLUTION INTRAVENOUS at 00:37

## 2021-01-01 RX ADMIN — FLUTICASONE PROPIONATE 2 SPRAY: 50 SPRAY, METERED NASAL at 08:45

## 2021-01-01 RX ADMIN — ENOXAPARIN SODIUM 50 MG: 60 INJECTION, SOLUTION INTRAVENOUS; SUBCUTANEOUS at 12:06

## 2021-01-01 RX ADMIN — DOCUSATE SODIUM 50MG AND SENNOSIDES 8.6MG 2 TABLET: 8.6; 5 TABLET, FILM COATED ORAL at 21:11

## 2021-01-01 RX ADMIN — PROPOFOL 50 MCG/KG/MIN: 10 INJECTION, EMULSION INTRAVENOUS at 07:11

## 2021-01-01 RX ADMIN — FAMOTIDINE 20 MG: 20 TABLET, FILM COATED ORAL at 20:30

## 2021-01-01 RX ADMIN — MINERAL OIL, AND WHITE PETROLATUM: 425; 573 OINTMENT OPHTHALMIC at 10:45

## 2021-01-01 RX ADMIN — FLUTICASONE PROPIONATE 2 SPRAY: 50 SPRAY, METERED NASAL at 09:23

## 2021-01-01 RX ADMIN — PHENYLEPHRINE HYDROCHLORIDE 2 MCG/KG/MIN: 10 INJECTION INTRAVENOUS at 12:30

## 2021-01-01 RX ADMIN — TOCILIZUMAB 800 MG: 20 INJECTION, SOLUTION, CONCENTRATE INTRAVENOUS at 15:13

## 2021-01-01 RX ADMIN — ACETAMINOPHEN 1000 MG: 500 TABLET ORAL at 15:29

## 2021-01-01 RX ADMIN — DEXAMETHASONE SODIUM PHOSPHATE 6 MG: 10 INJECTION, SOLUTION INTRAMUSCULAR; INTRAVENOUS at 08:44

## 2021-01-01 RX ADMIN — FLUTICASONE PROPIONATE 2 SPRAY: 50 SPRAY, METERED NASAL at 08:02

## 2021-01-01 RX ADMIN — FLUTICASONE PROPIONATE 2 SPRAY: 50 SPRAY, METERED NASAL at 12:07

## 2021-01-01 RX ADMIN — PROPOFOL 50 MCG/KG/MIN: 10 INJECTION, EMULSION INTRAVENOUS at 14:13

## 2021-01-01 RX ADMIN — BUMETANIDE 4 MG: 0.25 INJECTION INTRAMUSCULAR; INTRAVENOUS at 15:50

## 2021-01-01 RX ADMIN — DEXAMETHASONE SODIUM PHOSPHATE 6 MG: 10 INJECTION, SOLUTION INTRAMUSCULAR; INTRAVENOUS at 20:01

## 2021-01-01 RX ADMIN — SODIUM CHLORIDE 100 ML/HR: 9 INJECTION, SOLUTION INTRAVENOUS at 20:36

## 2021-01-01 RX ADMIN — INSULIN HUMAN 2 UNITS: 100 INJECTION, SOLUTION PARENTERAL at 05:18

## 2021-01-01 RX ADMIN — LEVOTHYROXINE SODIUM 150 MCG: 0.15 TABLET ORAL at 06:32

## 2021-01-01 RX ADMIN — SODIUM CHLORIDE, PRESERVATIVE FREE 10 ML: 5 INJECTION INTRAVENOUS at 21:12

## 2021-01-01 RX ADMIN — FAMOTIDINE 20 MG: 20 TABLET, FILM COATED ORAL at 17:40

## 2021-01-01 RX ADMIN — FAMOTIDINE 20 MG: 20 TABLET, FILM COATED ORAL at 06:30

## 2021-01-01 RX ADMIN — TAMSULOSIN HYDROCHLORIDE 0.4 MG: 0.4 CAPSULE ORAL at 08:02

## 2021-01-01 RX ADMIN — FLUTICASONE PROPIONATE 2 SPRAY: 50 SPRAY, METERED NASAL at 08:24

## 2021-01-01 RX ADMIN — PHENYLEPHRINE HYDROCHLORIDE 2.8 MCG/KG/MIN: 10 INJECTION INTRAVENOUS at 14:51

## 2021-01-01 RX ADMIN — REMDESIVIR 100 MG: 100 INJECTION, POWDER, LYOPHILIZED, FOR SOLUTION INTRAVENOUS at 20:01

## 2021-01-01 RX ADMIN — SODIUM CHLORIDE, PRESERVATIVE FREE 10 ML: 5 INJECTION INTRAVENOUS at 20:54

## 2021-01-01 RX ADMIN — TAMSULOSIN HYDROCHLORIDE 0.4 MG: 0.4 CAPSULE ORAL at 08:03

## 2021-01-01 RX ADMIN — INSULIN HUMAN 2 UNITS: 100 INJECTION, SOLUTION PARENTERAL at 17:06

## 2021-01-01 RX ADMIN — FLUTICASONE PROPIONATE 2 SPRAY: 50 SPRAY, METERED NASAL at 08:29

## 2021-01-01 RX ADMIN — Medication 3 MG: at 21:47

## 2021-01-01 RX ADMIN — ENOXAPARIN SODIUM 40 MG: 100 INJECTION, SOLUTION INTRAVENOUS; SUBCUTANEOUS at 21:01

## 2021-01-01 RX ADMIN — PROPOFOL 35 MCG/KG/MIN: 10 INJECTION, EMULSION INTRAVENOUS at 11:33

## 2021-01-01 RX ADMIN — SODIUM CHLORIDE, PRESERVATIVE FREE 10 ML: 5 INJECTION INTRAVENOUS at 08:02

## 2021-01-01 RX ADMIN — ASPIRIN 81 MG: 81 TABLET, COATED ORAL at 08:05

## 2021-01-01 RX ADMIN — CISATRACURIUM BESYLATE 8820 MCG: 2 INJECTION, SOLUTION INTRAVENOUS at 08:44

## 2021-01-01 RX ADMIN — SODIUM CHLORIDE, PRESERVATIVE FREE 10 ML: 5 INJECTION INTRAVENOUS at 08:45

## 2021-01-01 RX ADMIN — CISATRACURIUM BESYLATE 3 MCG/KG/MIN: 10 INJECTION, SOLUTION INTRAVENOUS at 14:20

## 2021-01-01 RX ADMIN — Medication 10 MG: at 20:29

## 2021-01-01 RX ADMIN — ASPIRIN 81 MG: 81 TABLET, COATED ORAL at 08:35

## 2021-01-01 RX ADMIN — SODIUM CHLORIDE, PRESERVATIVE FREE 10 ML: 5 INJECTION INTRAVENOUS at 20:36

## 2021-01-01 RX ADMIN — REMDESIVIR 100 MG: 100 INJECTION, POWDER, LYOPHILIZED, FOR SOLUTION INTRAVENOUS at 20:32

## 2021-01-01 RX ADMIN — FENTANYL CITRATE 300 MCG/HR: 50 INJECTION, SOLUTION INTRAMUSCULAR; INTRAVENOUS at 22:26

## 2021-01-01 RX ADMIN — DOCUSATE SODIUM 50MG AND SENNOSIDES 8.6MG 2 TABLET: 8.6; 5 TABLET, FILM COATED ORAL at 20:04

## 2021-01-01 RX ADMIN — SODIUM CHLORIDE, PRESERVATIVE FREE 10 ML: 5 INJECTION INTRAVENOUS at 21:38

## 2021-01-01 RX ADMIN — ASPIRIN 81 MG: 81 TABLET, COATED ORAL at 12:48

## 2021-01-01 RX ADMIN — DEXAMETHASONE SODIUM PHOSPHATE 10 MG: 10 INJECTION INTRAMUSCULAR; INTRAVENOUS at 08:44

## 2021-01-01 RX ADMIN — INSULIN HUMAN 2 UNITS: 100 INJECTION, SOLUTION PARENTERAL at 18:12

## 2021-01-01 RX ADMIN — DEXAMETHASONE SODIUM PHOSPHATE 6 MG: 10 INJECTION, SOLUTION INTRAMUSCULAR; INTRAVENOUS at 21:01

## 2021-01-01 RX ADMIN — SODIUM CHLORIDE, PRESERVATIVE FREE 10 ML: 5 INJECTION INTRAVENOUS at 08:41

## 2021-01-01 RX ADMIN — Medication 10 MG: at 20:35

## 2021-01-01 RX ADMIN — SODIUM CHLORIDE, PRESERVATIVE FREE 10 ML: 5 INJECTION INTRAVENOUS at 20:05

## 2021-01-01 RX ADMIN — DEXAMETHASONE SODIUM PHOSPHATE 6 MG: 10 INJECTION, SOLUTION INTRAMUSCULAR; INTRAVENOUS at 08:28

## 2021-01-01 RX ADMIN — LEVOTHYROXINE SODIUM 150 MCG: 0.15 TABLET ORAL at 05:31

## 2021-01-01 RX ADMIN — HYDROXYZINE HYDROCHLORIDE 100 MG: 50 TABLET, FILM COATED ORAL at 23:59

## 2021-01-01 RX ADMIN — SODIUM CHLORIDE, PRESERVATIVE FREE 10 ML: 5 INJECTION INTRAVENOUS at 08:20

## 2021-01-01 RX ADMIN — ASPIRIN 81 MG: 81 TABLET, COATED ORAL at 08:40

## 2021-01-01 RX ADMIN — SODIUM CHLORIDE, PRESERVATIVE FREE 10 ML: 5 INJECTION INTRAVENOUS at 21:10

## 2021-01-01 RX ADMIN — HYDROCODONE BITARTRATE AND HOMATROPINE METHYLBROMIDE 5 ML: 5; 1.5 SOLUTION ORAL at 00:21

## 2021-01-01 RX ADMIN — FLUTICASONE PROPIONATE 2 SPRAY: 50 SPRAY, METERED NASAL at 09:30

## 2021-01-01 RX ADMIN — LEVOTHYROXINE SODIUM 150 MCG: 0.15 TABLET ORAL at 06:03

## 2021-01-01 RX ADMIN — FAMOTIDINE 20 MG: 20 TABLET, FILM COATED ORAL at 08:45

## 2021-01-01 RX ADMIN — Medication 10 MG: at 20:36

## 2021-01-01 RX ADMIN — FAMOTIDINE 20 MG: 20 TABLET, FILM COATED ORAL at 08:40

## 2021-01-01 RX ADMIN — HYDROCODONE BITARTRATE AND HOMATROPINE METHYLBROMIDE 5 ML: 5; 1.5 SOLUTION ORAL at 06:03

## 2021-01-01 RX ADMIN — FLUTICASONE PROPIONATE 2 SPRAY: 50 SPRAY, METERED NASAL at 08:47

## 2021-01-01 RX ADMIN — LEVOTHYROXINE SODIUM 150 MCG: 0.15 TABLET ORAL at 05:13

## 2021-01-01 RX ADMIN — DEXAMETHASONE SODIUM PHOSPHATE 6 MG: 10 INJECTION, SOLUTION INTRAMUSCULAR; INTRAVENOUS at 20:04

## 2021-01-01 RX ADMIN — FAMOTIDINE 20 MG: 20 TABLET, FILM COATED ORAL at 17:54

## 2021-01-01 RX ADMIN — ONDANSETRON 4 MG: 2 INJECTION INTRAMUSCULAR; INTRAVENOUS at 16:59

## 2021-01-01 RX ADMIN — ENOXAPARIN SODIUM 90 MG: 100 INJECTION, SOLUTION INTRAVENOUS; SUBCUTANEOUS at 21:07

## 2021-01-01 RX ADMIN — DEXAMETHASONE SODIUM PHOSPHATE 6 MG: 10 INJECTION, SOLUTION INTRAMUSCULAR; INTRAVENOUS at 08:13

## 2021-01-01 RX ADMIN — ASPIRIN 81 MG: 81 TABLET, COATED ORAL at 09:20

## 2021-01-01 RX ADMIN — REMDESIVIR 200 MG: 100 INJECTION, POWDER, LYOPHILIZED, FOR SOLUTION INTRAVENOUS at 00:38

## 2021-01-01 RX ADMIN — SODIUM CHLORIDE, PRESERVATIVE FREE 10 ML: 5 INJECTION INTRAVENOUS at 20:38

## 2021-01-01 RX ADMIN — ENOXAPARIN SODIUM 100 MG: 100 INJECTION, SOLUTION INTRAVENOUS; SUBCUTANEOUS at 08:04

## 2021-01-01 RX ADMIN — PROPOFOL 50 MCG/KG/MIN: 10 INJECTION, EMULSION INTRAVENOUS at 00:51

## 2021-01-01 RX ADMIN — HYDROCODONE BITARTRATE AND HOMATROPINE METHYLBROMIDE 5 ML: 5; 1.5 SOLUTION ORAL at 22:15

## 2021-01-01 RX ADMIN — DEXAMETHASONE 6 MG: 4 TABLET ORAL at 20:35

## 2021-01-01 RX ADMIN — FAMOTIDINE 20 MG: 20 TABLET, FILM COATED ORAL at 06:31

## 2021-01-01 RX ADMIN — CISATRACURIUM BESYLATE 3 MCG/KG/MIN: 10 INJECTION, SOLUTION INTRAVENOUS at 08:17

## 2021-01-01 RX ADMIN — SODIUM CHLORIDE 100 ML/HR: 9 INJECTION, SOLUTION INTRAVENOUS at 08:48

## 2021-01-01 RX ADMIN — ENOXAPARIN SODIUM 100 MG: 100 INJECTION, SOLUTION INTRAVENOUS; SUBCUTANEOUS at 08:40

## 2021-01-01 RX ADMIN — SODIUM CHLORIDE, PRESERVATIVE FREE 10 ML: 5 INJECTION INTRAVENOUS at 08:19

## 2021-01-01 RX ADMIN — SODIUM CHLORIDE 125 ML/HR: 9 INJECTION, SOLUTION INTRAVENOUS at 18:17

## 2021-01-01 RX ADMIN — PHENYLEPHRINE HYDROCHLORIDE 2 MCG/KG/MIN: 10 INJECTION INTRAVENOUS at 04:44

## 2021-01-01 RX ADMIN — LEVOTHYROXINE SODIUM 150 MCG: 0.15 TABLET ORAL at 05:18

## 2021-01-01 RX ADMIN — DEXAMETHASONE SODIUM PHOSPHATE 10 MG: 10 INJECTION INTRAMUSCULAR; INTRAVENOUS at 21:07

## 2021-01-01 RX ADMIN — FAMOTIDINE 20 MG: 20 TABLET, FILM COATED ORAL at 16:59

## 2021-01-01 RX ADMIN — CISATRACURIUM BESYLATE 2.5 MCG/KG/MIN: 10 INJECTION, SOLUTION INTRAVENOUS at 12:20

## 2021-01-01 RX ADMIN — DEXAMETHASONE SODIUM PHOSPHATE 6 MG: 10 INJECTION, SOLUTION INTRAMUSCULAR; INTRAVENOUS at 20:55

## 2021-01-01 RX ADMIN — MINERAL OIL, AND WHITE PETROLATUM: 425; 573 OINTMENT OPHTHALMIC at 17:24

## 2021-01-01 RX ADMIN — ENOXAPARIN SODIUM 90 MG: 100 INJECTION, SOLUTION INTRAVENOUS; SUBCUTANEOUS at 10:03

## 2021-01-01 RX ADMIN — FLUTICASONE PROPIONATE 2 SPRAY: 50 SPRAY, METERED NASAL at 08:40

## 2021-01-01 RX ADMIN — FAMOTIDINE 20 MG: 20 TABLET, FILM COATED ORAL at 16:06

## 2021-01-01 RX ADMIN — ENOXAPARIN SODIUM 40 MG: 100 INJECTION, SOLUTION INTRAVENOUS; SUBCUTANEOUS at 08:01

## 2021-01-01 RX ADMIN — DEXAMETHASONE SODIUM PHOSPHATE 6 MG: 10 INJECTION, SOLUTION INTRAMUSCULAR; INTRAVENOUS at 21:00

## 2021-01-01 RX ADMIN — INSULIN HUMAN 2 UNITS: 100 INJECTION, SOLUTION PARENTERAL at 18:43

## 2021-01-01 RX ADMIN — Medication 10 MG: at 21:10

## 2021-01-01 RX ADMIN — FAMOTIDINE 20 MG: 20 TABLET, FILM COATED ORAL at 17:58

## 2021-01-01 RX ADMIN — ENOXAPARIN SODIUM 40 MG: 40 INJECTION SUBCUTANEOUS at 08:36

## 2021-01-01 RX ADMIN — ENOXAPARIN SODIUM 100 MG: 100 INJECTION, SOLUTION INTRAVENOUS; SUBCUTANEOUS at 20:38

## 2021-01-01 RX ADMIN — TAMSULOSIN HYDROCHLORIDE 0.4 MG: 0.4 CAPSULE ORAL at 08:44

## 2021-01-01 RX ADMIN — PHENYLEPHRINE HYDROCHLORIDE 2 MCG/KG/MIN: 10 INJECTION INTRAVENOUS at 12:00

## 2021-01-01 RX ADMIN — LEVOTHYROXINE SODIUM 150 MCG: 0.15 TABLET ORAL at 06:34

## 2021-01-01 RX ADMIN — Medication 10 MG: at 20:05

## 2021-01-01 RX ADMIN — INSULIN HUMAN 2 UNITS: 100 INJECTION, SOLUTION PARENTERAL at 15:24

## 2021-01-01 RX ADMIN — FENTANYL CITRATE 100 MCG/HR: 50 INJECTION, SOLUTION INTRAMUSCULAR; INTRAVENOUS at 12:49

## 2021-01-01 RX ADMIN — FENTANYL CITRATE 100 MCG/HR: 50 INJECTION, SOLUTION INTRAMUSCULAR; INTRAVENOUS at 23:59

## 2021-01-01 RX ADMIN — MORPHINE SULFATE 2 MG: 2 INJECTION, SOLUTION INTRAMUSCULAR; INTRAVENOUS at 09:09

## 2021-01-01 RX ADMIN — SODIUM CHLORIDE, PRESERVATIVE FREE 10 ML: 5 INJECTION INTRAVENOUS at 20:02

## 2021-01-01 RX ADMIN — PHENYLEPHRINE HYDROCHLORIDE 3 MCG/KG/MIN: 10 INJECTION INTRAVENOUS at 19:02

## 2021-01-01 RX ADMIN — TAMSULOSIN HYDROCHLORIDE 0.4 MG: 0.4 CAPSULE ORAL at 08:13

## 2021-01-01 RX ADMIN — SODIUM CHLORIDE, PRESERVATIVE FREE 10 ML: 5 INJECTION INTRAVENOUS at 08:48

## 2021-01-01 RX ADMIN — LEVOTHYROXINE SODIUM 150 MCG: 0.15 TABLET ORAL at 06:31

## 2021-01-01 RX ADMIN — INSULIN HUMAN 2 UNITS: 100 INJECTION, SOLUTION PARENTERAL at 12:06

## 2021-01-01 RX ADMIN — INSULIN HUMAN 2 UNITS: 100 INJECTION, SOLUTION PARENTERAL at 12:41

## 2021-01-01 RX ADMIN — HYDROXYZINE HYDROCHLORIDE 50 MG: 50 TABLET ORAL at 20:36

## 2021-01-01 RX ADMIN — DEXAMETHASONE SODIUM PHOSPHATE 6 MG: 10 INJECTION, SOLUTION INTRAMUSCULAR; INTRAVENOUS at 09:52

## 2021-01-01 RX ADMIN — ENOXAPARIN SODIUM 40 MG: 100 INJECTION, SOLUTION INTRAVENOUS; SUBCUTANEOUS at 09:21

## 2021-01-01 RX ADMIN — ENOXAPARIN SODIUM 40 MG: 100 INJECTION, SOLUTION INTRAVENOUS; SUBCUTANEOUS at 09:52

## 2021-01-01 RX ADMIN — MINERAL OIL, AND WHITE PETROLATUM: 425; 573 OINTMENT OPHTHALMIC at 08:17

## 2021-01-01 RX ADMIN — PROPOFOL 40 MCG/KG/MIN: 10 INJECTION, EMULSION INTRAVENOUS at 03:43

## 2021-01-01 RX ADMIN — INSULIN HUMAN 2 UNITS: 100 INJECTION, SOLUTION PARENTERAL at 23:10

## 2021-01-01 RX ADMIN — DOCUSATE SODIUM 50MG AND SENNOSIDES 8.6MG 2 TABLET: 8.6; 5 TABLET, FILM COATED ORAL at 08:28

## 2021-01-01 RX ADMIN — PROPOFOL 50 MCG/KG/MIN: 10 INJECTION, EMULSION INTRAVENOUS at 10:45

## 2021-01-01 RX ADMIN — FAMOTIDINE 20 MG: 20 TABLET, FILM COATED ORAL at 08:02

## 2021-01-01 RX ADMIN — INSULIN HUMAN 2 UNITS: 100 INJECTION, SOLUTION PARENTERAL at 18:59

## 2021-01-01 RX ADMIN — FENTANYL CITRATE 300 MCG/HR: 50 INJECTION, SOLUTION INTRAMUSCULAR; INTRAVENOUS at 14:15

## 2021-01-01 RX ADMIN — TAMSULOSIN HYDROCHLORIDE 0.4 MG: 0.4 CAPSULE ORAL at 12:50

## 2021-01-01 RX ADMIN — CISATRACURIUM BESYLATE 4.5 MCG/KG/MIN: 10 INJECTION, SOLUTION INTRAVENOUS at 10:09

## 2021-01-01 RX ADMIN — DOCUSATE SODIUM 50MG AND SENNOSIDES 8.6MG 2 TABLET: 8.6; 5 TABLET, FILM COATED ORAL at 20:36

## 2021-01-01 RX ADMIN — TAMSULOSIN HYDROCHLORIDE 0.4 MG: 0.4 CAPSULE ORAL at 09:22

## 2021-01-01 RX ADMIN — SODIUM CHLORIDE, PRESERVATIVE FREE 10 ML: 5 INJECTION INTRAVENOUS at 08:31

## 2021-01-01 RX ADMIN — LEVOTHYROXINE SODIUM 150 MCG: 0.15 TABLET ORAL at 06:46

## 2021-01-01 RX ADMIN — MINERAL OIL, AND WHITE PETROLATUM: 425; 573 OINTMENT OPHTHALMIC at 12:19

## 2021-01-01 RX ADMIN — ASPIRIN 81 MG: 81 TABLET, COATED ORAL at 08:13

## 2021-01-01 RX ADMIN — ENOXAPARIN SODIUM 40 MG: 100 INJECTION, SOLUTION INTRAVENOUS; SUBCUTANEOUS at 08:28

## 2021-01-01 RX ADMIN — FENTANYL CITRATE 200 MCG/HR: 50 INJECTION, SOLUTION INTRAMUSCULAR; INTRAVENOUS at 08:20

## 2021-01-01 RX ADMIN — SODIUM POLYSTYRENE SULFONATE 30 G: 15 SUSPENSION ORAL; RECTAL at 15:51

## 2021-01-01 RX ADMIN — PHENYLEPHRINE HYDROCHLORIDE 2.6 MCG/KG/MIN: 10 INJECTION INTRAVENOUS at 18:57

## 2021-01-01 RX ADMIN — SODIUM CHLORIDE, PRESERVATIVE FREE 10 ML: 5 INJECTION INTRAVENOUS at 08:39

## 2021-01-01 RX ADMIN — DOCUSATE SODIUM 50MG AND SENNOSIDES 8.6MG 2 TABLET: 8.6; 5 TABLET, FILM COATED ORAL at 08:40

## 2021-01-01 RX ADMIN — ENOXAPARIN SODIUM 50 MG: 60 INJECTION, SOLUTION INTRAVENOUS; SUBCUTANEOUS at 22:57

## 2021-01-01 RX ADMIN — TAMSULOSIN HYDROCHLORIDE 0.4 MG: 0.4 CAPSULE ORAL at 08:36

## 2021-01-01 RX ADMIN — HYDROXYZINE HYDROCHLORIDE 100 MG: 50 TABLET, FILM COATED ORAL at 00:15

## 2021-01-01 RX ADMIN — REMDESIVIR 100 MG: 100 INJECTION, POWDER, LYOPHILIZED, FOR SOLUTION INTRAVENOUS at 20:02

## 2021-01-01 RX ADMIN — ENOXAPARIN SODIUM 90 MG: 100 INJECTION, SOLUTION INTRAVENOUS; SUBCUTANEOUS at 08:44

## 2021-01-02 NOTE — ED NOTES
Pt reports moving a table backwards when he tripped and fell on R leg. Redness and abrasion noted to posterior R thigh. Pt c/o pain of posterior R thigh. +ROM, denies any numbness or tingling. Denies hitting head. Pt states the men he was with stated he did have LOC. Pt reports feeling nauseated and dizzy when he got up after falling. Pt a&ox4, abc's intact, NAD noted.     Pt noted to have mask on when this RN entered the room. This RN wore appropriate PPE throughout our encounter. Hand hygiene performed upon entering and exiting room.       Marley Lemus, RN  01/02/21 9775

## 2021-01-02 NOTE — ED NOTES
Pt tripped while working outside and fell. Pt landed on his right leg. Pt denies head trauma. Pt states he stood up and became dizzy and nauseated. Pt also complains of right upper leg pain.     Pt arrived to ER wearing a face mask.     Sisi Bojorquez RN  01/02/21 7501

## 2021-01-02 NOTE — ED PROVIDER NOTES
EMERGENCY DEPARTMENT ENCOUNTER    Room Number:  22/22  Date of encounter:  1/2/2021  PCP: Marco Kenney MD  Historian: Patient      HPI:  Chief Complaint: Fall, right hip pain      Context: Nilson Buchanan is a 70 y.o. male who presents to the ED c/o of mechanical fall with resultant right hip pain.  He states that he was stepping backwards and fell over a barrel.  He reports he landed on the corner of a bed on his right mid thigh.  He reports pain to that location.  He reports bystanders state that he lost consciousness and he had dizziness for about 20 to 30 minutes following this.  He described his room spinning dizziness.  He did not hit his head.  He denies previous dizziness.  He has not taken any medicine for symptoms.  Ambulation made the dizziness in the thigh pain worse.  Nothing seemed to make it better.      PAST MEDICAL HISTORY  Active Ambulatory Problems     Diagnosis Date Noted   • Allergic rhinitis 02/21/2014   • History of colon polyps, 01/14/2016--normal study. 2010--serrated adenoma ×1.   05/10/2010   • Gastroesophageal reflux disease without esophagitis 04/29/2016   • Hyperlipidemia 04/29/2016   • Hypogonadism male, 08/14/2018--TRT initiated. 06/10/2013   • Primary hypothyroidism 04/29/2016   • Impaired fasting glucose 04/29/2016   • Male erectile disorder 11/23/2015   • Multiple environmental allergies 04/29/2016   • Obstructive sleep apnea, 09/08/2005--AHI 18.2.  Oxygen saturation 81%.  Patient cannot tolerate CPAP. 09/08/2005   • Vitamin D deficiency 04/29/2016   • Therapeutic drug monitoring 05/31/2016   • Morbidly obese (CMS/Formerly [facility]) 12/19/2016   • Benign non-nodular prostatic hyperplasia with lower urinary tract symptoms 02/09/2017   • Routine physical examination 08/14/2018   • Mood disorder (CMS/Formerly [facility]) 01/30/2019   • Right flank pain 11/09/2020   • Urinary urgency 11/09/2020   • Urinary frequency 11/09/2020     Resolved Ambulatory Problems     Diagnosis Date Noted   • History of Acute  serous otitis media 04/29/2016   • History of Asthmatic bronchitis with exacerbation 04/29/2016   • History of cardiovascular stress test 04/29/2016   • History of Encounter for Zostavax administration 04/29/2016   • History of chest x-ray 04/29/2016   • History of acute bronchitis 04/29/2016   • History of Interstitial pneumonia 04/29/2016   • History of pneumococcal vaccination 04/29/2016   • History of Thoracic compression fracture 04/29/2016   • History of Ankle pain 06/24/2015   • History of Foot pain 06/24/2015   • History of BPPV (benign paroxysmal positional vertigo) 06/20/2016   • Family history of liver cancer 12/18/2016   • Abnormal weight gain 12/19/2016   • History of acute sinusitis 01/24/2017   • Dysuria 02/09/2017   • History of acute prostatitis 02/09/2017   • History of Bicipital tendinitis, right shoulder 07/11/2017   • History of Right rotator cuff tendinitis 07/11/2017   • History of carotid Doppler/vascular screen 07/11/2017   • Need for influenza vaccination 01/15/2018   • Acute serous otitis media of left ear 02/07/2018   • Infectious colitis 10/02/2018   • Hospital emergency room discharge follow-up 10/02/2018   • Elbow laceration, left, subsequent encounter 11/06/2018   • Encounter for removal of sutures 11/06/2018   • Chronic right shoulder pain 04/29/2019   • Complete tear of right rotator cuff 05/07/2019     Past Medical History:   Diagnosis Date   • History of pneumonia 12/06/2014   • Hyperlipidemia 4/29/2016   • Hypothyroidism 4/29/2016         PAST SURGICAL HISTORY  Past Surgical History:   Procedure Laterality Date   • ANKLE SURGERY Left 2004 2004--repair of left ankle fracture with hardware placement.   • CARPAL TUNNEL RELEASE Right 1989 1989--right carpal tunnel release.   • CATARACT EXTRACTION, BILATERAL  08/2019 August 2019--bilateral cataract extirpation with intraocular lens implantation.   • COLONOSCOPY  05/10/2010    05/10/2010--colonoscopy revealed a 3 mm cecal  polyp that was removed completely. The remainder of the colonoscopy was normal. Recommend repeat colonoscopy in 5 years. If the polyp is adenomatous, would recommend all first-degree relatives over age 40 be in screening as well. Pathology returned serrated adenoma.   • COLONOSCOPY  2016--normal colonoscopy.  Recommended repeat study in 10 years.   • LAPAROSCOPIC CHOLECYSTECTOMY  2012--laparoscopic cholecystectomy. Pathology returned cholesterolosis.   • SHOULDER ARTHROSCOPY DISTAL CLAVICLE EXCISION AND OPEN ROTATOR CUFF REPAIR  10/23/2019    2019--right shoulder arthroscopy with rotator cuff repair, subacromial decompression, and extensive debridement.  Open distal clavicle excision and open subpectoral biceps tenodesis.   • TONSILLECTOMY  Childhood    Childhood tonsillectomy         FAMILY HISTORY  Family History   Problem Relation Age of Onset   • Cancer Father         Liver Cancer. Father  from liver cancer.   • Heart attack Brother         Acute Myocardial Infarction.  Brother had a myocardial infarction at age 68.         SOCIAL HISTORY  Social History     Socioeconomic History   • Marital status:      Spouse name: Not on file   • Number of children: 2   • Years of education: Not on file   • Highest education level: 12th grade   Occupational History   • Occupation: Retired - [employer]   Social Needs   • Financial resource strain: Not hard at all   • Food insecurity     Worry: Never true     Inability: Never true   • Transportation needs     Medical: No     Non-medical: No   Tobacco Use   • Smoking status: Former Smoker   • Smokeless tobacco: Never Used   • Tobacco comment: Stopped smoking at age 35.   Substance and Sexual Activity   • Alcohol use: Yes     Frequency: Monthly or less     Drinks per session: 1 or 2     Comment: Moderately   • Drug use: No   • Sexual activity: Yes     Partners: Female   Lifestyle   • Physical activity     Days per week: 7  days     Minutes per session: 120 min   • Stress: Not at all   Relationships   • Social connections     Talks on phone: More than three times a week     Gets together: Three times a week     Attends [employer] service: More than 4 times per year     Active member of club or organization: No     Attends meetings of clubs or organizations: Never     Relationship status:          ALLERGIES  Cortisone        REVIEW OF SYSTEMS  Review of Systems   Negative for chest pain, negative for shortness of breath, negative for seizure-like activity, negative for abdominal pain.  Negative for nausea.  Negative for palpitations.  Negative for preceding symptoms.  All systems reviewed and negative except for those discussed in HPI.       PHYSICAL EXAM    I have reviewed the triage vital signs and nursing notes.    ED Triage Vitals    Temp Heart Rate Resp BP SpO2   98.2 °F (36.8 °C) 78 18 117/71 96 %      Temp src Heart Rate Source Patient Position BP Location FiO2 (%)   -- -- -- -- --       Physical Exam  GENERAL: Awake, pleasant, not distressed  HENT: nares patent  EYES: no scleral icterus  CV: regular rhythm, regular rate  RESPIRATORY: normal effort  ABDOMEN: soft  MUSCULOSKELETAL: no deformity.  Right lateral thigh with abrasion and tenderness.  No tenderness to the knee or the hip.  No laceration.  No bleeding.  Distal pulses strong.  NEURO: alert, moves all extremities, follows commands.  Cerebellar exam normal.  Cranial nerves II through X intact.  SKIN: warm, dry        LAB RESULTS  Recent Results (from the past 24 hour(s))   ECG 12 Lead    Collection Time: 01/02/21 12:35 PM   Result Value Ref Range    QT Interval 379 ms   Comprehensive Metabolic Panel    Collection Time: 01/02/21  1:00 PM    Specimen: Blood   Result Value Ref Range    Glucose 99 65 - 99 mg/dL    BUN 16 8 - 23 mg/dL    Creatinine 1.20 0.76 - 1.27 mg/dL    Sodium 138 136 - 145 mmol/L    Potassium 4.2 3.5 - 5.2 mmol/L    Chloride 105 98 - 107 mmol/L     CO2 21.9 (L) 22.0 - 29.0 mmol/L    Calcium 9.2 8.6 - 10.5 mg/dL    Total Protein 6.9 6.0 - 8.5 g/dL    Albumin 4.10 3.50 - 5.20 g/dL    ALT (SGPT) 26 1 - 41 U/L    AST (SGOT) 23 1 - 40 U/L    Alkaline Phosphatase 65 39 - 117 U/L    Total Bilirubin 0.9 0.0 - 1.2 mg/dL    eGFR Non African Amer 60 (L) >60 mL/min/1.73    Globulin 2.8 gm/dL    A/G Ratio 1.5 g/dL    BUN/Creatinine Ratio 13.3 7.0 - 25.0    Anion Gap 11.1 5.0 - 15.0 mmol/L   Troponin    Collection Time: 01/02/21  1:00 PM    Specimen: Blood   Result Value Ref Range    Troponin T <0.010 0.000 - 0.030 ng/mL   CBC Auto Differential    Collection Time: 01/02/21  1:00 PM    Specimen: Blood   Result Value Ref Range    WBC 7.91 3.40 - 10.80 10*3/mm3    RBC 4.80 4.14 - 5.80 10*6/mm3    Hemoglobin 14.9 13.0 - 17.7 g/dL    Hematocrit 43.9 37.5 - 51.0 %    MCV 91.5 79.0 - 97.0 fL    MCH 31.0 26.6 - 33.0 pg    MCHC 33.9 31.5 - 35.7 g/dL    RDW 12.7 12.3 - 15.4 %    RDW-SD 42.2 37.0 - 54.0 fl    MPV 9.0 6.0 - 12.0 fL    Platelets 204 140 - 450 10*3/mm3    Neutrophil % 71.4 42.7 - 76.0 %    Lymphocyte % 17.3 (L) 19.6 - 45.3 %    Monocyte % 8.5 5.0 - 12.0 %    Eosinophil % 2.1 0.3 - 6.2 %    Basophil % 0.4 0.0 - 1.5 %    Immature Grans % 0.3 0.0 - 0.5 %    Neutrophils, Absolute 5.65 1.70 - 7.00 10*3/mm3    Lymphocytes, Absolute 1.37 0.70 - 3.10 10*3/mm3    Monocytes, Absolute 0.67 0.10 - 0.90 10*3/mm3    Eosinophils, Absolute 0.17 0.00 - 0.40 10*3/mm3    Basophils, Absolute 0.03 0.00 - 0.20 10*3/mm3    Immature Grans, Absolute 0.02 0.00 - 0.05 10*3/mm3    nRBC 0.0 0.0 - 0.2 /100 WBC       Ordered the above labs and independently reviewed the results.    EKG          EKG time: 1235  Rhythm/Rate: Normal sinus, rate of 70  P waves and NJ: Upright with normal NJ  QRS, axis: Normal axis, narrow QRS  ST and T waves: Normal ST, T waves    Interpreted Contemporaneously by me, independently viewed        RADIOLOGY  Xr Femur 2 View Right    Result Date: 1/2/2021  XR FEMUR 2 VW  RIGHT-  INDICATIONS: Trauma  TECHNIQUE: Frontal and lateral views of the right femur  COMPARISON: None available  FINDINGS:  No acute fracture, erosion, or dislocation is identified. Follow-up/further evaluation can be obtained as indications persist.       As described.    This report was finalized on 1/2/2021 12:51 PM by Dr. Adarsh Lima M.D.      Ct Head Without Contrast    Result Date: 1/2/2021  CT HEAD WO CONTRAST-  INDICATIONS: Dizziness  TECHNIQUE: Radiation dose reduction techniques were utilized, including automated exposure control and exposure modulation based on body size. Noncontrast head CT  COMPARISON: None available  FINDINGS:    No acute intracranial hemorrhage, midline shift or mass effect. No acute territorial infarct is identified.  Mild periventricular hypodensities suggest chronic small vessel ischemic change in a patient this age.    Ventricles, cisterns, cerebral sulci are unremarkable for patient age.  Partial opacification of ethmoid air cells is noted. Likely mucous retention cyst or polyp in right sphenoid sinus. Small mucosal thickening in the frontal sinuses. The visualized paranasal sinuses, orbits, mastoid air cells are otherwise unremarkable.        No acute intracranial hemorrhage or hydrocephalus. If there is further clinical concern, MRI could be considered for further evaluation.  Paranasal sinus disease.  This report was finalized on 1/2/2021 1:52 PM by Dr. Adarsh Lima M.D.        I ordered the above noted radiological studies. Reviewed by me and discussed with radiologist.  See dictation for official radiology interpretation.      PROCEDURES    Procedures      MEDICATIONS GIVEN IN ER    Medications - No data to display      PROGRESS, DATA ANALYSIS, CONSULTS, AND MEDICAL DECISION MAKING    All labs have been independently reviewed by me.  All radiology studies have been reviewed by me and discussed with radiologist dictating the report.   EKG's independently viewed  and interpreted by me.  Discussion below represents my analysis of pertinent findings related to patient's condition, differential diagnosis, treatment plan and final disposition.        ED Course as of Jan 02 1440   Sat Jan 02, 2021   1435 Reviewed work-up and findings with the patient.  He states he remembers the entire event and did not lose consciousness.  He had no symptoms prior to the mechanical fall.  He states his right thigh pain can be treated with Tylenol and he does not need anything stronger.  He wants to go home.  He is now completely asymptomatic from a syncope/presyncope standpoint.    [TR]      ED Course User Index  [TR] Laith Hook MD           PPE: Both the patient and I wore a surgical mask throughout the entire patient encounter. I wore protective goggles.     AS OF 14:40 EST VITALS:    BP - 120/73  HR - 75  TEMP - 98.2 °F (36.8 °C)  O2 SATS - 95%        DIAGNOSIS  Final diagnoses:   Contusion of right thigh, initial encounter   Near syncope         DISPOSITION  Discharged home           Laith Hook MD  01/02/21 1441

## 2021-01-04 NOTE — OUTREACH NOTE
Patient Outreach Note  Incoming call from patient. Talked with patient. Discussed 1/2/21 ED visit regarding contusion of right thigh and near syncope due to mechanical fall . Patient reports no difficulty with headache; vision; dizziness or syncope. He reports contusion between knee and hip is decreased in swelling. He reports to be ambulating without difficulty. Patient states he will contact PCP as needed.Patient reports to be independent with ADL's; meals preparation; transportation and ambulates without assistive device. He reports no difficulty with fever; chest pain; SOB; appetite or sleeping. He states to be compliant with medications and medical appointments. Reviewed with patient ED AVS  recommendations; education provided; fall and safety precautions; follow up with PCP; COVID 19 precautions; 24/7 Nurse Line Telephone number; ACM contact information; Advance Directives; My Chart; gaps in care; MWV; Case Management services and [facility] Planning and Support services.  Patient verbalized understanding and states to appreciate phone call.  No further questions or concerns voiced at this time.       Fanta Prather RN  Ambulatory     1/4/2021, 17:10 EST

## 2021-01-13 NOTE — TELEPHONE ENCOUNTER
----- Message from Nilson Buchanan sent at 1/12/2021 10:56 AM EST -----  Regarding: Prescription Question  Contact: [phone]  [facility] tamsulosin 0.4 mg for 90 day supply

## 2021-03-04 PROBLEM — R35.0 URINARY FREQUENCY: Status: RESOLVED | Noted: 2020-01-01 | Resolved: 2021-01-01

## 2021-03-04 PROBLEM — R10.9 RIGHT FLANK PAIN: Status: RESOLVED | Noted: 2020-01-01 | Resolved: 2021-01-01

## 2021-03-04 PROBLEM — R39.15 URINARY URGENCY: Status: RESOLVED | Noted: 2020-01-01 | Resolved: 2021-01-01

## 2021-03-04 PROBLEM — Z00.00 ROUTINE PHYSICAL EXAMINATION: Status: RESOLVED | Noted: 2018-08-14 | Resolved: 2021-01-01

## 2021-03-04 NOTE — PROGRESS NOTES
03/04/2021    Patient Information  Nilson Buchanan                                                                                          [address] [facility] [address]      1950  [unfilled]  There is no work phone number on file.    Chief Complaint:     Follow-up lab work in order to monitor chronic medical issues listed in history of present illness.  No new acute complaints.    History of Present Illness:    Patient with a history of impaired fasting glucose, hyperlipidemia, hypothyroidism, hypogonadism, history of colon polyps, esophageal reflux, environmental allergies, sleep apnea, vitamin D deficiency, morbid obesity, symptomatic BPH, mood disorder.  He presents today for follow-up with lab prior in order to monitor his chronic medical issues.  His past medical history reviewed and updated were necessary including health maintenance parameters.  This reveals he is currently up-to-date or else accounted for.    Review of Systems   Constitution: Negative.   HENT: Negative.    Eyes: Negative.    Cardiovascular: Negative.    Respiratory: Negative.    Endocrine: Negative.    Hematologic/Lymphatic: Negative.    Skin: Negative.    Musculoskeletal: Positive for arthritis.   Gastrointestinal: Negative.    Genitourinary: Negative.    Neurological: Negative.    Psychiatric/Behavioral: Negative.    Allergic/Immunologic: Negative.        Active Problems:    Patient Active Problem List   Diagnosis   • Allergic rhinitis   • History of colon polyps, 01/14/2016--normal study. 2010--serrated adenoma ×1.     • Gastroesophageal reflux disease without esophagitis   • Hyperlipidemia   • Hypogonadism male, 08/14/2018--TRT initiated.   • Primary hypothyroidism   • Impaired fasting glucose   • Male erectile disorder   • Multiple environmental allergies   • Obstructive sleep apnea, 09/08/2005--AHI 18.2.  Oxygen saturation 81%.  Patient cannot tolerate CPAP.   • Vitamin D deficiency   • Therapeutic drug  monitoring   • Morbidly obese (CMS/[facility])   • Benign non-nodular prostatic hyperplasia with lower urinary tract symptoms   • Mood disorder (CMS/[facility])         Past Medical History:   Diagnosis Date   • Allergic rhinitis 2/21/2014    Patient reports he had allergy testing several years ago in his mid 20s. He reports allergies to molds. He was on immunotherapy for approximately one year and then this was discontinued. He was told he did not need it.   02/21/2014--Flonase 2 sprays each nostril daily initiated.   • Benign prostatic hypertrophy 2/9/2017 02/29/2017--patient reports sudden onset a few days ago with dysuria and BPH obstructive symptoms.  He had intercourse recently and after that begin to have burning with urination which has continued.  He subsequently developed intermittent obstructive symptoms consisting of urgency, weak stream, dribbling and sensation of incomplete voiding.  Digital rectal examination avoided today.  Urinalysis, urine culture, PSA ordered.  Empiric Cipro 500 mg by mouth twice a day ×30 days, Flomax 0.4 mg by mouth daily.  I will have patient follow-up after completion of antibiotics with a PSA 2 days after completion of the antibiotics providing his PSA comes back elevated or above baseline.   • Gastroesophageal reflux disease without esophagitis 4/29/2016   • History of acute prostatitis 2/9/2017    03/10/2017--patient seen in follow-up and his symptoms resolved.  PSA is 0.630.  PSA at the initiation of treatment was 1.03.  Urinalysis and urine culture returned negative.  02/29/2017--patient reports sudden onset a few days ago with dysuria and BPH obstructive symptoms.  He had intercourse recently and after that begin to have burning with urination which has continued.  He subsequently developed intermittent obstructive symptoms consisting of urgency, weak stream, dribbling and sensation of incomplete voiding.  Digital rectal examination avoided today.  Urinalysis, urine culture, PSA  ordered.  Empiric Cipro 500 mg by mouth twice a day ×30 days, Flomax 0.4 mg by mouth daily.  I will have patient follow-up after completion of antibiotics with a PSA 2 days after completion of the antibiotics providing his PSA comes back elevated or above baseline.   • History of BPPV (benign paroxysmal positional vertigo) 6/20/2016 12/19/2016--patient reports symptoms have resolved.  06/20/2016--patient presents with a new complaint of a several week history of intermittent episodes of dizziness described as a spinning sensation.  It is always associated with abrupt standing for lying down to go to bed.  Only lasts for a very short period of time and there is no other associated symptoms.  He does not feel the symptoms are bad enough at the present time to warrant vestibular therapy.   • History of colon polyps, 01/14/2016--normal study. 2010--serrated adenoma ×1.   5/10/2010    01/14/2016--normal colonoscopy.  Recommended repeat study in 10 years.  05/10/2010--colonoscopy revealed a 3 mm cecal polyp that was removed completely. The remainder of the colonoscopy was normal. Recommend repeat colonoscopy in 5 years. If the polyp is adenomatous, would recommend all first-degree relatives over age 40 be in screening as well. Pathology returned serrated adenoma.   • History of pneumonia 12/06/2014 12/06/2014--patient seen in follow up and reports he is definitely better but he is still having a cough that seems to come and go. This been no fever. He still has some shortness of breath but this is better. Lung examination reveals continued bibasilar crackles but they are somewhat improved over previous. I reviewed the chest x-ray results and even though the chest x-ray shows no definite pneum   • History of Thoracic compression fracture 03/28/2004 03/28/2004--patient fell and suffered a moderate T12 compression fracture. No subluxation or retropulsion of fracture fragments is seen. Approximately 40% loss of  anterior vertebral body height.   • Hyperlipidemia 4/29/2016   • Hypogonadism male, 08/14/2018--TRT initiated. 6/10/2013    02/21/2014--treatment for hypothyroidism discontinued. Patient did not feel any better with the testosterone. It also is extremely expensive.  06/10/2013--treatment for hypogonadism begun.      • Hypothyroidism 4/29/2016   • Impaired fasting glucose 4/29/2016   • Male erectile disorder 11/23/2015 11/23/2015--patient presents with complaints of erectile dysfunction. He does have borderline hypogonadism but previous treatment was not particularly effective. I gave him samples of Stendra, Viagra, Staxyn.   • Mood disorder (CMS/[facility]) 1/30/2019   • Multiple environmental allergies 4/29/2016    Patient reports he had allergy testing several years ago in his mid 20s. He reports allergies to molds. He was on immunotherapy for approximately one year and then this was discontinued. He was told he did not need it.   • Obstructive sleep apnea, 09/08/2005--AHI 18.2.  Oxygen saturation 81%.  Patient cannot tolerate CPAP. 9/8/2005 09/08/2005--sleep study revealed an apnea/hypopnea index for total sleep time to be moderately abnormal at 18.2. Lowest oxygen saturation 81%. CPAP was tried but patient is intolerant.   • Primary hypothyroidism 4/29/2016   • Vitamin D deficiency 4/29/2016         Past Surgical History:   Procedure Laterality Date   • ANKLE SURGERY Left 2004 2004--repair of left ankle fracture with hardware placement.   • CARPAL TUNNEL RELEASE Right 1989 1989--right carpal tunnel release.   • CATARACT EXTRACTION, BILATERAL  08/2019 August 2019--bilateral cataract extirpation with intraocular lens implantation.   • COLONOSCOPY  05/10/2010    05/10/2010--colonoscopy revealed a 3 mm cecal polyp that was removed completely. The remainder of the colonoscopy was normal. Recommend repeat colonoscopy in 5 years. If the polyp is adenomatous, would recommend all first-degree relatives over age  40 be in screening as well. Pathology returned serrated adenoma.   • COLONOSCOPY  2016--normal colonoscopy.  Recommended repeat study in 10 years.   • LAPAROSCOPIC CHOLECYSTECTOMY  2012--laparoscopic cholecystectomy. Pathology returned cholesterolosis.   • SHOULDER ARTHROSCOPY DISTAL CLAVICLE EXCISION AND OPEN ROTATOR CUFF REPAIR  10/23/2019    2019--right shoulder arthroscopy with rotator cuff repair, subacromial decompression, and extensive debridement.  Open distal clavicle excision and open subpectoral biceps tenodesis.   • TONSILLECTOMY  Childhood    Childhood tonsillectomy         Allergies   Allergen Reactions   • Cortisone Other (See Comments)     Locks up patient jjoint           Current Outpatient Medications:   •  aspirin 81 MG tablet, Take 1 tablet by mouth Daily., Disp: , Rfl:   •  ezetimibe (ZETIA) 10 MG tablet, Take 1 p.o. daily for high cholesterol, Disp: 90 tablet, Rfl: 3  •  fluticasone (FLONASE) 50 MCG/ACT nasal spray, 2 sprays by Each Nare route Daily., Disp: 16 g, Rfl: 8  •  levothyroxine (SYNTHROID, LEVOTHROID) 150 MCG tablet, Take 1 p.o. daily for low thyroid, Disp: 90 tablet, Rfl: 3  •  simvastatin (ZOCOR) 40 MG tablet, Take 1 p.o. daily for high cholesterol., Disp: 90 tablet, Rfl: 3  •  tamsulosin (FLOMAX) 0.4 MG capsule 24 hr capsule, Take 1 p.o. nightly for prostate symptoms, Disp: 90 capsule, Rfl: 1  •  Testosterone (AndroGel Pump) 20.25 MG/ACT (1.62%) gel, Apply a total of 4 pumps daily as directed, Disp: 450 g, Rfl: 1  •  vitamin D3 125 MCG (5000 UT) capsule capsule, Take 1 p.o. daily for low vitamin D, Disp: 30 capsule, Rfl:       Family History   Problem Relation Age of Onset   • Cancer Father         Liver Cancer. Father  from liver cancer.   • Heart attack Brother         Acute Myocardial Infarction.  Brother had a myocardial infarction at age 68.         Social History     Socioeconomic History   • Marital status:       "Spouse name: Not on file   • Number of children: 2   • Years of education: Not on file   • Highest education level: 12th grade   Occupational History   • Occupation: Retired - [employer]   Social Needs   • Financial resource strain: Not hard at all   • Food insecurity     Worry: Never true     Inability: Never true   • Transportation needs     Medical: No     Non-medical: No   Tobacco Use   • Smoking status: Former Smoker   • Smokeless tobacco: Never Used   • Tobacco comment: Stopped smoking at age 35.   Substance and Sexual Activity   • Alcohol use: Yes     Frequency: Monthly or less     Drinks per session: 1 or 2     Comment: Moderately   • Drug use: No   • Sexual activity: Yes     Partners: Female   Lifestyle   • Physical activity     Days per week: 7 days     Minutes per session: 120 min   • Stress: Not at all   Relationships   • Social connections     Talks on phone: More than three times a week     Gets together: Three times a week     Attends Manda[employer] service: More than 4 times per year     Active member of club or organization: No     Attends meetings of clubs or organizations: Never     Relationship status:          Vitals:    03/04/21 0929   BP: 126/74   BP Location: Left arm   Pulse: 84   SpO2: 98%   Weight: 104 kg (229 lb)   Height: 172.7 cm (67.99\")        Body mass index is 34.83 kg/m².      Physical Exam:    General: Alert and oriented x 3.  No acute distress.  Normal affect.  Obese.  HEENT: Pupils equal, round, reactive to light; extraocular movements intact; sclerae nonicteric; pharynx, ear canals and TMs normal.  Neck: Without JVD, thyromegaly, bruit, or adenopathy.  Lungs: Clear to auscultation in all fields.  Heart: Regular rate and rhythm without murmur, rub, gallop, or click.  Abdomen: Soft, nontender, without hepatosplenomegaly or hernia.  Bowel sounds normal.  : Deferred.  Rectal: Deferred.  Extremities: Without clubbing, cyanosis, edema, or pulse deficit.  Neurologic: Intact without " focal deficit.  Normal station and gait observed during ingress and egress from the examination room.  Skin: Without significant lesion.  Musculoskeletal: Unremarkable.    Lab/other results:    NMR reveals a total cholesterol of 103.  Triglycerides 135.  LDL particle number excellent 722.  Small LDL particle number excellent 4 and 35.  HDL particle number little low at 29.3.  CBC is normal.  CMP normal except glucose 107, creatinine 1.35.  Estimated GFR 52.  Total testosterone was little elevated at 931.  Free and weakly bound testosterone little elevated at 277.4.  Hemoglobin A1c 6.0.  Thyroid function tests are normal.  Mildly elevated free T4 noted.  Vitamin D low at 26.0.  CPK normal.    Assessment/Plan:     Diagnosis Plan   1. Impaired fasting glucose  Comprehensive Metabolic Panel    Hemoglobin A1c    Urinalysis With Microscopic If Indicated (No Culture) - Urine, Clean Catch   2. Hyperlipidemia  CK    Comprehensive Metabolic Panel    NMR LipoProfile    simvastatin (ZOCOR) 40 MG tablet    ezetimibe (ZETIA) 10 MG tablet   3. Primary hypothyroidism  TSH    T4, Free    T3, Free    levothyroxine (SYNTHROID, LEVOTHROID) 150 MCG tablet   4. Hypogonadism male, 08/14/2018--TRT initiated.  Testosterone,Free+Weakly Bound   5. History of colon polyps, 01/14/2016--normal study. 2010--serrated adenoma ×1.    Ambulatory Referral For Screening Colonoscopy   6. Gastroesophageal reflux disease without esophagitis     7. Multiple environmental allergies     8. Obstructive sleep apnea, 09/08/2005--AHI 18.2.  Oxygen saturation 81%.  Patient cannot tolerate CPAP.     9. Vitamin D deficiency  Vitamin D 25 Hydroxy    vitamin D3 125 MCG (5000 UT) capsule capsule   10. Morbidly obese (CMS/HCC)     11. Benign non-nodular prostatic hyperplasia with lower urinary tract symptoms  PSA DIAGNOSTIC   12. Mood disorder (CMS/[facility])     13. Therapeutic drug monitoring  CBC (No Diff)    Urinalysis With Microscopic If Indicated (No Culture) - Urine,  Clean Catch    Testosterone,Free+Weakly Bound   14. Colon cancer screening  Ambulatory Referral For Screening Colonoscopy   15. Hypogonadism male, patient elects no TRT.     16. Hypothyroidism, unspecified type       Patient has impaired fasting glucose that does not require medication.  He has obesity and I strongly recommend that he follow a low carbohydrate diet, exercise, and weight loss.  Hyperlipidemia is under good control.  His thyroid is therapeutic on 150 mcg of levothyroxine.  He has symptomatic hypogonadism and his testosterone levels are little bit high but not enough to make any changes.  He has a history of colon polyps and needs a colonoscopy.  He has esophageal reflux without esophagitis which is currently asymptomatic and he is not taking any medication for it.  He has multiple environmental allergies that seem to be reasonably controlled with Flonase.  He has sleep apnea but he cannot tolerate CPAP.  Vitamin D is low and I recommended that he take 5000 units of vitamin D daily.  BPH symptoms seem to be improved with tamsulosin.  Mood disorder is stable and in remission.    Plan is as follows: Refill medications.  Colonoscopy ordered.  Patient will follow-up after August 19, 2021 with lab prior and this will also be subsequent Medicare wellness visit.        Procedures

## 2021-04-15 NOTE — PROGRESS NOTES
04/15/2021    Patient Information  Nilson Buchanan                                                                                          [address] [facility] [place] [address]      1950  [unfilled]  There is no work phone number on file.    Chief Complaint:     Complaining of male erectile dysfunction.    History of Present Illness:    Patient with a history of hypogonadism, impaired fasting glucose, sleep apnea, hypothyroidism.  He presents today with complaints of male erectile dysfunction that only occurs occasionally.  I had him come in so we could review his chart before initiating medication such as Viagra.  Patient does not feel he will need to take it all the time.  His past medical history reviewed and updated were necessary including health maintenance parameters.  It appears the patient does not want to have the Covid vaccination because he is concerned about side effects.  Counseled patient regarding that.    Review of Systems   Constitutional: Negative.   HENT: Negative.    Eyes: Negative.    Cardiovascular: Negative.    Respiratory: Negative.    Endocrine: Negative.    Hematologic/Lymphatic: Negative.    Skin: Negative.    Musculoskeletal: Negative.    Gastrointestinal: Negative.    Genitourinary: Negative.         Occasional male right erectile dysfunction   Neurological: Negative.    Psychiatric/Behavioral: Negative.    Allergic/Immunologic: Negative.        Active Problems:    Patient Active Problem List   Diagnosis   • Allergic rhinitis   • History of colon polyps, 01/14/2016--normal study. 2010--serrated adenoma ×1.     • Gastroesophageal reflux disease without esophagitis   • Hyperlipidemia   • Hypogonadism male, 08/14/2018--TRT initiated.   • Primary hypothyroidism   • Impaired fasting glucose   • Male erectile disorder   • Multiple environmental allergies   • Obstructive sleep apnea, 09/08/2005--AHI 18.2.  Oxygen saturation 81%.  Patient cannot tolerate CPAP.   •  Vitamin D deficiency   • Therapeutic drug monitoring   • Morbidly obese (CMS/[facility])   • Benign non-nodular prostatic hyperplasia with lower urinary tract symptoms   • Mood disorder (CMS/[facility])         Past Medical History:   Diagnosis Date   • Allergic rhinitis 2/21/2014    Patient reports he had allergy testing several years ago in his mid 20s. He reports allergies to molds. He was on immunotherapy for approximately one year and then this was discontinued. He was told he did not need it.   02/21/2014--Flonase 2 sprays each nostril daily initiated.   • Benign prostatic hypertrophy 2/9/2017 02/29/2017--patient reports sudden onset a few days ago with dysuria and BPH obstructive symptoms.  He had intercourse recently and after that begin to have burning with urination which has continued.  He subsequently developed intermittent obstructive symptoms consisting of urgency, weak stream, dribbling and sensation of incomplete voiding.  Digital rectal examination avoided today.  Urinalysis, urine culture, PSA ordered.  Empiric Cipro 500 mg by mouth twice a day ×30 days, Flomax 0.4 mg by mouth daily.  I will have patient follow-up after completion of antibiotics with a PSA 2 days after completion of the antibiotics providing his PSA comes back elevated or above baseline.   • Gastroesophageal reflux disease without esophagitis 4/29/2016   • History of acute prostatitis 2/9/2017    03/10/2017--patient seen in follow-up and his symptoms resolved.  PSA is 0.630.  PSA at the initiation of treatment was 1.03.  Urinalysis and urine culture returned negative.  02/29/2017--patient reports sudden onset a few days ago with dysuria and BPH obstructive symptoms.  He had intercourse recently and after that begin to have burning with urination which has continued.  He subsequently developed intermittent obstructive symptoms consisting of urgency, weak stream, dribbling and sensation of incomplete voiding.  Digital rectal examination avoided  today.  Urinalysis, urine culture, PSA ordered.  Empiric Cipro 500 mg by mouth twice a day ×30 days, Flomax 0.4 mg by mouth daily.  I will have patient follow-up after completion of antibiotics with a PSA 2 days after completion of the antibiotics providing his PSA comes back elevated or above baseline.   • History of BPPV (benign paroxysmal positional vertigo) 6/20/2016 12/19/2016--patient reports symptoms have resolved.  06/20/2016--patient presents with a new complaint of a several week history of intermittent episodes of dizziness described as a spinning sensation.  It is always associated with abrupt standing for lying down to go to bed.  Only lasts for a very short period of time and there is no other associated symptoms.  He does not feel the symptoms are bad enough at the present time to warrant vestibular therapy.   • History of colon polyps, 01/14/2016--normal study. 2010--serrated adenoma ×1.   5/10/2010    01/14/2016--normal colonoscopy.  Recommended repeat study in 10 years.  05/10/2010--colonoscopy revealed a 3 mm cecal polyp that was removed completely. The remainder of the colonoscopy was normal. Recommend repeat colonoscopy in 5 years. If the polyp is adenomatous, would recommend all first-degree relatives over age 40 be in screening as well. Pathology returned serrated adenoma.   • History of pneumonia 12/06/2014 12/06/2014--patient seen in follow up and reports he is definitely better but he is still having a cough that seems to come and go. This been no fever. He still has some shortness of breath but this is better. Lung examination reveals continued bibasilar crackles but they are somewhat improved over previous. I reviewed the chest x-ray results and even though the chest x-ray shows no definite pneum   • History of Thoracic compression fracture 03/28/2004 03/28/2004--patient fell and suffered a moderate T12 compression fracture. No subluxation or retropulsion of fracture fragments is  seen. Approximately 40% loss of anterior vertebral body height.   • Hyperlipidemia 4/29/2016   • Hypogonadism male, 08/14/2018--TRT initiated. 6/10/2013    02/21/2014--treatment for hypothyroidism discontinued. Patient did not feel any better with the testosterone. It also is extremely expensive.  06/10/2013--treatment for hypogonadism begun.      • Hypothyroidism 4/29/2016   • Impaired fasting glucose 4/29/2016   • Male erectile disorder 11/23/2015 11/23/2015--patient presents with complaints of erectile dysfunction. He does have borderline hypogonadism but previous treatment was not particularly effective. I gave him samples of Stendra, Viagra, Staxyn.   • Mood disorder (CMS/[facility]) 1/30/2019   • Multiple environmental allergies 4/29/2016    Patient reports he had allergy testing several years ago in his mid 20s. He reports allergies to molds. He was on immunotherapy for approximately one year and then this was discontinued. He was told he did not need it.   • Obstructive sleep apnea, 09/08/2005--AHI 18.2.  Oxygen saturation 81%.  Patient cannot tolerate CPAP. 9/8/2005 09/08/2005--sleep study revealed an apnea/hypopnea index for total sleep time to be moderately abnormal at 18.2. Lowest oxygen saturation 81%. CPAP was tried but patient is intolerant.   • Primary hypothyroidism 4/29/2016   • Vitamin D deficiency 4/29/2016         Past Surgical History:   Procedure Laterality Date   • ANKLE SURGERY Left 2004 2004--repair of left ankle fracture with hardware placement.   • CARPAL TUNNEL RELEASE Right 1989 1989--right carpal tunnel release.   • CATARACT EXTRACTION, BILATERAL  08/2019 August 2019--bilateral cataract extirpation with intraocular lens implantation.   • COLONOSCOPY  05/10/2010    05/10/2010--colonoscopy revealed a 3 mm cecal polyp that was removed completely. The remainder of the colonoscopy was normal. Recommend repeat colonoscopy in 5 years. If the polyp is adenomatous, would recommend all  first-degree relatives over age 40 be in screening as well. Pathology returned serrated adenoma.   • COLONOSCOPY  2016--normal colonoscopy.  Recommended repeat study in 10 years.   • LAPAROSCOPIC CHOLECYSTECTOMY  2012--laparoscopic cholecystectomy. Pathology returned cholesterolosis.   • SHOULDER ARTHROSCOPY DISTAL CLAVICLE EXCISION AND OPEN ROTATOR CUFF REPAIR  10/23/2019    2019--right shoulder arthroscopy with rotator cuff repair, subacromial decompression, and extensive debridement.  Open distal clavicle excision and open subpectoral biceps tenodesis.   • TONSILLECTOMY  Childhood    Childhood tonsillectomy         Allergies   Allergen Reactions   • Cortisone Other (See Comments)     Locks up patient jjoint           Current Outpatient Medications:   •  aspirin 81 MG tablet, Take 1 tablet by mouth Daily., Disp: , Rfl:   •  ezetimibe (ZETIA) 10 MG tablet, Take 1 p.o. daily for high cholesterol, Disp: 90 tablet, Rfl: 3  •  fluticasone (FLONASE) 50 MCG/ACT nasal spray, 2 sprays by Each Nare route Daily., Disp: 16 g, Rfl: 8  •  levothyroxine (SYNTHROID, LEVOTHROID) 150 MCG tablet, Take 1 p.o. daily for low thyroid, Disp: 90 tablet, Rfl: 3  •  simvastatin (ZOCOR) 40 MG tablet, Take 1 p.o. daily for high cholesterol., Disp: 90 tablet, Rfl: 3  •  tamsulosin (FLOMAX) 0.4 MG capsule 24 hr capsule, Take 1 p.o. nightly for prostate symptoms, Disp: 90 capsule, Rfl: 1  •  Testosterone (AndroGel Pump) 20.25 MG/ACT (1.62%) gel, Apply a total of 4 pumps daily as directed, Disp: 450 g, Rfl: 1  •  vitamin D3 125 MCG (5000 UT) capsule capsule, Take 1 p.o. daily for low vitamin D, Disp: 30 capsule, Rfl:   •  sildenafil (Viagra) 100 MG tablet, Take as directed prior to anticipated sexual activity, Disp: 6 tablet, Rfl: 11      Family History   Problem Relation Age of Onset   • Cancer Father         Liver Cancer. Father  from liver cancer.   • Heart attack Brother         Acute  "Myocardial Infarction.  Brother had a myocardial infarction at age 68.         Social History     Socioeconomic History   • Marital status:      Spouse name: Not on file   • Number of children: 2   • Years of education: Not on file   • Highest education level: 12th grade   Tobacco Use   • Smoking status: Former Smoker   • Smokeless tobacco: Never Used   • Tobacco comment: Stopped smoking at age 35.   Vaping Use   • Vaping Use: Never used   Substance and Sexual Activity   • Alcohol use: Yes     Comment: Moderately   • Drug use: No   • Sexual activity: Yes     Partners: Female         Vitals:    04/15/21 1417   BP: 120/72   Pulse: 93   Resp: 16   Temp: 98.4 °F (36.9 °C)   SpO2: 96%   Weight: 103 kg (226 lb)   Height: 172.7 cm (68\")        Body mass index is 34.36 kg/m².      Physical Exam:    General: Alert and oriented x 3.  No acute distress.  Normal affect.  Mildly obese.  HEENT: Pupils equal, round, reactive to light; extraocular movements intact; sclerae nonicteric; pharynx, ear canals and TMs normal.  Neck: Without JVD, thyromegaly, bruit, or adenopathy.  Lungs: Clear to auscultation in all fields.  Heart: Regular rate and rhythm without murmur, rub, gallop, or click.  Abdomen: Soft, nontender, without hepatosplenomegaly or hernia.  Bowel sounds normal.  : Deferred.  Rectal: Deferred.  Extremities: Without clubbing, cyanosis, edema, or pulse deficit.  Neurologic: Intact without focal deficit.  Normal station and gait observed during ingress and egress from the examination room.  Skin: Without significant lesion.  Musculoskeletal: Unremarkable.    Lab/other results:    I reviewed patient's latest testosterone levels which were actually a little supratherapeutic.    Assessment/Plan:     Diagnosis Plan   1. Male erectile disorder  sildenafil (Viagra) 100 MG tablet   2. Hypogonadism male, 08/14/2018--TRT initiated.       Patient is having problems with male erectile dysfunction.  He has hypogonadism but " has adequate testosterone levels.  He is taking Flomax and we had a discussion regarding potential interactions between Viagra and the Flomax.  Patient understands and is willing to take the risk.  If he develops any side effects and he needs to contact me immediately and stop taking the Viagra.    Plan is as follows: Prescription for Viagra 100 mg, I suggested that he start out with a half a pill as needed.  If this is not adequate then a full 100 mg pill is the maximum dose.  He could possibly even get by with a third of the pill.  Keep previously scheduled lab and follow-up in August as planned.        Procedures        Answers for HPI/ROS submitted by the patient on 4/13/2021  What is the primary reason for your visit?: Other  Please describe your symptoms.: ed  Have you had these symptoms before?: Yes  How long have you been having these symptoms?: Greater than 2 weeks

## 2021-08-04 NOTE — TELEPHONE ENCOUNTER
Caller: Nilson Buchanan    Relationship: [facility]    Best call back number: [phone]    Medication needed:   Requested Prescriptions     Pending Prescriptions Disp Refills   • Testosterone (AndroGel Pump) 20.25 MG/ACT (1.62%) gel 450 g 1     Sig: Apply a total of 4 pumps daily as directed       When do you need the refill by: ASAP     What additional details did the patient provide when requesting the medication: PATIENT STATES THAT PHARMACY ADVISED HIM THAT PRIOR AUTH IS NEEDED FOR REFILL     Does the patient have less than a 3 day supply:  [x] Yes  [] No    What is the patient's preferred pharmacy: [facility] - [facility], [address] [phone] [facility] [phone] [place]

## 2021-08-18 PROBLEM — R50.9 FEBRILE ILLNESS, ACUTE: Status: ACTIVE | Noted: 2021-01-01

## 2021-08-18 PROBLEM — J18.9 PNEUMONIA OF BOTH LUNGS DUE TO INFECTIOUS ORGANISM: Status: ACTIVE | Noted: 2021-01-01

## 2021-08-18 NOTE — PROGRESS NOTES
08/18/2021    Patient Information  Nilson Buchanan                                                                                          [address] [facility] [address]      1950  [unfilled]  There is no work phone number on file.    Chief Complaint:     Complaining of chest congestion, fever, cough, head congestion.    History of Present Illness:    Patient with a history of colon polyps, esophageal reflux, hyperlipidemia, hypogonadism, hypothyroidism, impaired fasting glucose, environmental allergies, sleep apnea, vitamin D deficiency, symptomatic BPH.  He presents today with an acute febrile illness as described below.  Past medical history reviewed and updated were necessary including health maintenance parameters.  This reveals he is currently up-to-date or else accounted for with the exception of COVID-19 vaccination.    Review of Systems   Constitutional: Positive for chills, fever and malaise/fatigue.   HENT: Positive for congestion.         No change in sense of taste or smell   Eyes: Negative.    Cardiovascular: Negative.  Negative for chest pain, dyspnea on exertion, orthopnea, palpitations and syncope.   Respiratory: Positive for cough and sputum production. Negative for shortness of breath.    Endocrine: Negative.    Hematologic/Lymphatic: Negative.    Skin: Negative.    Musculoskeletal: Negative.    Gastrointestinal: Positive for nausea and vomiting.   Genitourinary: Negative.    Neurological: Negative.    Psychiatric/Behavioral: Negative.    Allergic/Immunologic: Negative.        Active Problems:    Patient Active Problem List   Diagnosis   • Allergic rhinitis   • History of colon polyps, 01/14/2016--normal study. 2010--serrated adenoma ×1.     • Gastroesophageal reflux disease without esophagitis   • Hyperlipidemia   • Hypogonadism male, 08/14/2018--TRT initiated.   • Primary hypothyroidism   • Impaired fasting glucose   • Male erectile disorder   • Multiple  environmental allergies   • Obstructive sleep apnea, 09/08/2005--AHI 18.2.  Oxygen saturation 81%.  Patient cannot tolerate CPAP.   • Vitamin D deficiency   • Therapeutic drug monitoring   • Morbidly obese (CMS/[facility])   • Benign non-nodular prostatic hyperplasia with lower urinary tract symptoms   • Mood disorder (CMS/[facility])   • Febrile illness, acute   • Pneumonia of both lungs due to infectious organism         Past Medical History:   Diagnosis Date   • Allergic rhinitis 2/21/2014    Patient reports he had allergy testing several years ago in his mid 20s. He reports allergies to molds. He was on immunotherapy for approximately one year and then this was discontinued. He was told he did not need it.   02/21/2014--Flonase 2 sprays each nostril daily initiated.   • Benign prostatic hypertrophy 2/9/2017 02/29/2017--patient reports sudden onset a few days ago with dysuria and BPH obstructive symptoms.  He had intercourse recently and after that begin to have burning with urination which has continued.  He subsequently developed intermittent obstructive symptoms consisting of urgency, weak stream, dribbling and sensation of incomplete voiding.  Digital rectal examination avoided today.  Urinalysis, urine culture, PSA ordered.  Empiric Cipro 500 mg by mouth twice a day ×30 days, Flomax 0.4 mg by mouth daily.  I will have patient follow-up after completion of antibiotics with a PSA 2 days after completion of the antibiotics providing his PSA comes back elevated or above baseline.   • Gastroesophageal reflux disease without esophagitis 4/29/2016   • History of acute prostatitis 2/9/2017    03/10/2017--patient seen in follow-up and his symptoms resolved.  PSA is 0.630.  PSA at the initiation of treatment was 1.03.  Urinalysis and urine culture returned negative.  02/29/2017--patient reports sudden onset a few days ago with dysuria and BPH obstructive symptoms.  He had intercourse recently and after that begin to have burning  with urination which has continued.  He subsequently developed intermittent obstructive symptoms consisting of urgency, weak stream, dribbling and sensation of incomplete voiding.  Digital rectal examination avoided today.  Urinalysis, urine culture, PSA ordered.  Empiric Cipro 500 mg by mouth twice a day ×30 days, Flomax 0.4 mg by mouth daily.  I will have patient follow-up after completion of antibiotics with a PSA 2 days after completion of the antibiotics providing his PSA comes back elevated or above baseline.   • History of BPPV (benign paroxysmal positional vertigo) 6/20/2016 12/19/2016--patient reports symptoms have resolved.  06/20/2016--patient presents with a new complaint of a several week history of intermittent episodes of dizziness described as a spinning sensation.  It is always associated with abrupt standing for lying down to go to bed.  Only lasts for a very short period of time and there is no other associated symptoms.  He does not feel the symptoms are bad enough at the present time to warrant vestibular therapy.   • History of colon polyps, 01/14/2016--normal study. 2010--serrated adenoma ×1.   5/10/2010    01/14/2016--normal colonoscopy.  Recommended repeat study in 10 years.  05/10/2010--colonoscopy revealed a 3 mm cecal polyp that was removed completely. The remainder of the colonoscopy was normal. Recommend repeat colonoscopy in 5 years. If the polyp is adenomatous, would recommend all first-degree relatives over age 40 be in screening as well. Pathology returned serrated adenoma.   • History of pneumonia 12/06/2014 12/06/2014--patient seen in follow up and reports he is definitely better but he is still having a cough that seems to come and go. This been no fever. He still has some shortness of breath but this is better. Lung examination reveals continued bibasilar crackles but they are somewhat improved over previous. I reviewed the chest x-ray results and even though the chest  x-ray shows no definite pneum   • History of Thoracic compression fracture 03/28/2004 03/28/2004--patient fell and suffered a moderate T12 compression fracture. No subluxation or retropulsion of fracture fragments is seen. Approximately 40% loss of anterior vertebral body height.   • Hyperlipidemia 4/29/2016   • Hypogonadism male, 08/14/2018--TRT initiated. 6/10/2013    02/21/2014--treatment for hypothyroidism discontinued. Patient did not feel any better with the testosterone. It also is extremely expensive.  06/10/2013--treatment for hypogonadism begun.      • Hypothyroidism 4/29/2016   • Impaired fasting glucose 4/29/2016   • Male erectile disorder 11/23/2015 11/23/2015--patient presents with complaints of erectile dysfunction. He does have borderline hypogonadism but previous treatment was not particularly effective. I gave him samples of Stendra, Viagra, Staxyn.   • Mood disorder (CMS/[facility]) 1/30/2019   • Multiple environmental allergies 4/29/2016    Patient reports he had allergy testing several years ago in his mid 20s. He reports allergies to molds. He was on immunotherapy for approximately one year and then this was discontinued. He was told he did not need it.   • Obstructive sleep apnea, 09/08/2005--AHI 18.2.  Oxygen saturation 81%.  Patient cannot tolerate CPAP. 9/8/2005 09/08/2005--sleep study revealed an apnea/hypopnea index for total sleep time to be moderately abnormal at 18.2. Lowest oxygen saturation 81%. CPAP was tried but patient is intolerant.   • Primary hypothyroidism 4/29/2016   • Vitamin D deficiency 4/29/2016         Past Surgical History:   Procedure Laterality Date   • ANKLE SURGERY Left 2004 2004--repair of left ankle fracture with hardware placement.   • CARPAL TUNNEL RELEASE Right 1989 1989--right carpal tunnel release.   • CATARACT EXTRACTION, BILATERAL  08/2019 August 2019--bilateral cataract extirpation with intraocular lens implantation.   • COLONOSCOPY  05/10/2010     05/10/2010--colonoscopy revealed a 3 mm cecal polyp that was removed completely. The remainder of the colonoscopy was normal. Recommend repeat colonoscopy in 5 years. If the polyp is adenomatous, would recommend all first-degree relatives over age 40 be in screening as well. Pathology returned serrated adenoma.   • COLONOSCOPY  01/14/2016 01/14/2016--normal colonoscopy.  Recommended repeat study in 10 years.   • LAPAROSCOPIC CHOLECYSTECTOMY  04/03/2012 04/03/2012--laparoscopic cholecystectomy. Pathology returned cholesterolosis.   • SHOULDER ARTHROSCOPY DISTAL CLAVICLE EXCISION AND OPEN ROTATOR CUFF REPAIR  10/23/2019    October 23, 2019--right shoulder arthroscopy with rotator cuff repair, subacromial decompression, and extensive debridement.  Open distal clavicle excision and open subpectoral biceps tenodesis.   • TONSILLECTOMY  Childhood    Childhood tonsillectomy         Allergies   Allergen Reactions   • Cortisone Other (See Comments)     Locks up patient jjoint           Current Outpatient Medications:   •  aspirin 81 MG tablet, Take 1 tablet by mouth Daily., Disp: , Rfl:   •  ezetimibe (ZETIA) 10 MG tablet, Take 1 p.o. daily for high cholesterol, Disp: 90 tablet, Rfl: 3  •  fluticasone (FLONASE) 50 MCG/ACT nasal spray, 2 sprays by Each Nare route Daily., Disp: 16 g, Rfl: 8  •  levothyroxine (SYNTHROID, LEVOTHROID) 150 MCG tablet, Take 1 p.o. daily for low thyroid, Disp: 90 tablet, Rfl: 3  •  sildenafil (Viagra) 100 MG tablet, Take as directed prior to anticipated sexual activity, Disp: 6 tablet, Rfl: 11  •  simvastatin (ZOCOR) 40 MG tablet, Take 1 p.o. daily for high cholesterol., Disp: 90 tablet, Rfl: 3  •  tamsulosin (FLOMAX) 0.4 MG capsule 24 hr capsule, Take 1 p.o. nightly for prostate symptoms, Disp: 90 capsule, Rfl: 1  •  Testosterone (AndroGel Pump) 20.25 MG/ACT (1.62%) gel, Apply a total of 4 pumps daily as directed, Disp: 450 g, Rfl: 1  •  vitamin D3 125 MCG (5000 UT) capsule capsule, Take 1  "p.o. daily for low vitamin D, Disp: 30 capsule, Rfl:       Family History   Problem Relation Age of Onset   • Cancer Father         Liver Cancer. Father  from liver cancer.   • Heart attack Brother         Acute Myocardial Infarction.  Brother had a myocardial infarction at age 68.         Social History     Socioeconomic History   • Marital status:      Spouse name: Not on file   • Number of children: 2   • Years of education: Not on file   • Highest education level: 12th grade   Tobacco Use   • Smoking status: Former Smoker   • Smokeless tobacco: Never Used   • Tobacco comment: Stopped smoking at age 35.   Vaping Use   • Vaping Use: Never used   Substance and Sexual Activity   • Alcohol use: Yes     Comment: Moderately   • Drug use: No   • Sexual activity: Yes     Partners: Female         Vitals:    21 1103   BP: 120/70   Pulse: 98   Resp: 15   Temp: 100.4 °F (38 °C)   SpO2: 98%   Weight: 102 kg (225 lb 3.2 oz)   Height: 172.7 cm (68\")        Body mass index is 34.24 kg/m².      Physical Exam:    General: Alert and oriented x 3.  No acute distress.  Normal affect.  HEENT: Pupils equal, round, reactive to light; extraocular movements intact; sclerae nonicteric; pharynx, ear canals and TMs normal.  Neck: Without JVD, thyromegaly, bruit, or adenopathy.  Lungs: Bilateral posterior crackles/rhonchi.  Heart: Regular rate and rhythm without murmur, rub, gallop, or click.  Abdomen: Soft, nontender, without hepatosplenomegaly or hernia.  Bowel sounds normal.  : Deferred.  Rectal: Deferred.  Extremities: Without clubbing, cyanosis, edema, or pulse deficit.  Neurologic: Intact without focal deficit.  Normal station and gait observed during ingress and egress from the examination room.  Skin: Without significant lesion.  Musculoskeletal: Unremarkable.    Lab/other results:      Assessment/Plan:     Diagnosis Plan   1. Febrile illness, acute  COVID-19,[facility],NP/OP Swab in Transport Media or ESwab 72 HR TAT " - Swab, Nasopharynx    XR Chest PA & Lateral   2. Pneumonia of both lower lobes due to infectious organism  COVID-19,[facility],NP/OP Swab in Transport Media or ESwab 72 HR TAT - Swab, Nasopharynx    XR Chest PA & Lateral     August 18, 2021--patient presents with 72-hour history of sudden onset febrile illness.  He is describing fever, shaking chills, head congestion, posterior nasal drainage which causes a sore throat.  The phlegm is clear.  No shortness of breath but he has been coughing.  There has been no change in sense of taste or smell.  He has fatigue and achiness consistent with flulike illness.  The sputum is yellow.  On exam there is boggy nasal mucosa.  No definite tenderness to percussion over the sinuses.  Lung examination reveals bibasilar crackles/rales.  Oxygen saturation is 98%.  His current temperature is 100.4.  Respiratory rate 15.  Blood pressure is stable at 120/70.  I am concerned the patient has bilateral pneumonia.  COVID-19 is certainly a consideration as well.  Plan is as follows: Testing for Covid ASAP.  Stat chest x-ray PA and lateral obtained and revealed subtle peripheral airspace opacities in both lungs, suspicious for multifocal pneumonia in the setting of Covid infection.  Pleural spaces are clear.  The cardiac, mediastinal, and hilar contours are within normal limits and not changed significantly.  Aortic calcific atherosclerosis noted.  Lung volumes are low.  Prednisone 50 mg p.o. daily x7 days, taper and discontinue.  Doxycycline 200 mg p.o. now and then 1 p.o. twice daily for 4 more days.  Ivermectin 12 mg p.o. daily x5 days.  Patient is aware that this is off label use.  Given patient's risk factors and the high suspicion for Covid infection, I think we need to be aggressive.  Tussionex cough syrup ordered.  Ondansetron 8 mg every 6 hours as needed nausea.  Patient did have an episode of nausea and emesis in the office.  If his test for Covid comes back positive then I am going  to refer him for a BAM infusion.  Patient instructed to contact me for any worsening of symptoms.  If he should become severely short of breath and he needs to report to the emergency room.    Note: Greater than 50 minutes was spent evaluating this patient today person-to-person and greater than 50% of this time was spent counseling patient regarding his acute illness.  [id number] level service justified.    August 20, 2021--patient's Covid testing came back positive yesterday.  I spoke with patient on the phone yesterday and he actually sounded better and reported he was feeling somewhat better.  He started the steroids and the doxycycline.  Pharmacy did not have the ivermectin but he was to pick that up yesterday afternoon.  Patient has numerous risk factors including obesity, age greater than 65, impaired fasting glucose with no overt diabetes.  I think he would be a good candidate for bam infusion and we are completing the paperwork today.    Procedures

## 2021-08-20 PROBLEM — U07.1 CLINICAL DIAGNOSIS OF COVID-19: Status: ACTIVE | Noted: 2021-01-01

## 2021-08-20 NOTE — ED TRIAGE NOTES
Pt to ED from ACU. Pt did not meet parameters for infusion so RN brought pt to ED. Pt has cough with no other complaints.

## 2021-08-20 NOTE — PROGRESS NOTES
Patient provided with Fact Sheet for Patients, Parents and Caregivers Emergency Use Authorization (EUA) of Casirivimab / Imdevimab for Coronavirus Disease 2019 (COVID-19) form.    Reviewed and patient verbalized understanding.  Appropriate PPE worn during the care of the patient.  Advised patient not to receive Covid vaccine for 90 days.

## 2021-08-20 NOTE — PROGRESS NOTES
Patient provided with Fact Sheet for Patients, Parents and Caregivers Emergency Use Authorization (EUA) of Casirivimab / Imdevimab for Coronavirus Disease 2019 (COVID-19) form.    Reviewed and patient verbalized understanding.  Appropriate PPE worn during the care of the patient.  Advised patient not to receive Covid vaccine for 90 days.    1340-Even when patient sitting quietly O2 sat ranged from 89-91.  Dr. Kenney's office was called and informed that patient was being transported to ED since his O2 sat did not meet the criteria to start Monoclonial infusion.  Patient was transported to ED per wheelchair and report given to Monserrat.

## 2021-08-20 NOTE — PROGRESS NOTES
[facility]  Clinical Pharmacy Department     Pharmacy Consult/Review: COVID-19 Monoclonal Antibody    Nilson Buchanan is a 71 y.o. male presenting with mild to moderate COVID-19 symptoms and has tested positive for SARS-CoV-2.    COVID-19 Monoclonal Antibody Ordered (casirivimab/imdevimab): 8/20/21  Ordering/Consulting Provider: Dr. Marco Kenney  Date of Confirmed SARS-CoV-2: 8/19/21  Date of Symptom Onset : 8/16/21    Allergies  Allergies as of 08/20/2021 - Reviewed 08/20/2021   Allergen Reaction Noted    Cortisone Other (See Comments) 04/29/2019     Microbiology  Microbiology Results (last 10 days)       Procedure Component Value - Date/Time    COVID-19,[facility] ROUTINE, NP/OP SWAB IN TRANSPORT MEDIA OR ESWAB 72 HR TAT - , [[id number]]  (Abnormal) Collected: 08/18/21 0000    Lab Status: Final result Updated: 08/19/21 1435     SARS-CoV-2, ZENAIDA Detected     Comment: Patients who have a positive COVID-19 test result may now have  treatment options. Treatment options are available for patients  with mild to moderate symptoms and for hospitalized patients.  Visit our website at [email or web address] for  resources and information.  This nucleic acid amplification test was developed and its performance  characteristics determined by [employer]. Nucleic acid  amplification tests include RT-PCR and TMA. This test has not been  FDA cleared or approved. This test has been authorized by FDA under  an Emergency Use Authorization (EUA). This test is only authorized  for the duration of time the declaration that circumstances exist  justifying the authorization of the emergency use of in vitro  diagnostic tests for detection of SARS-CoV-2 virus and/or diagnosis  of COVID-19 infection under section 564(b)(1) of the Act, 21 U.S.C.  360bbb-3(b) (1), unless the authorization is terminated or revoked  sooner.  When diagnostic testing is negative, the possibility of a false  negative result should be considered  in the context of a patient's  recent exposures and the presence of clinical signs and symptoms  consistent with COVID-19. An individual without symptoms of COVID-19  and who is not shedding SARS-CoV-2 virus would expect to have a  negative (not detected) result in this assay.         Narrative:      Performed at:  01 - [facility]  [address], [place]  [id number]  : Lindsey Greco [facility], Phone:  [phone]    SARS-CoV-2, ZENAIDA 2 DAY TAT - , [[id number]] Collected: 08/18/21 0000    Lab Status: Final result Updated: 08/19/21 1435     [facility] SARS-COV-2, ZENAIDA 2 DAY TAT Performed    Narrative:      Performed at:  02 - [facility] [address]  [id number]  : Kendall Castellano PhD, Phone:  [phone]     Assessment/Plan:    Patient is not hospitalized due to COVID-19 infection and does not require oxygen therapy or an increase in baseline oxygen flow rate due to COVID-19.   All inclusions, exclusions, and monitoring requirements listed below have been reviewed.    Patient has tested positive for SARS-CoV-2.  Patient is within 10 days of symptom onset.  Patient is not hospitalized due to COVID-19 infection.  Patient is not requiring oxygen therapy or an increase in baseline oxygen flow rate.   Patient is at high risk for progressing to severe COVID-19 and/or hospitalization as defined by having met the following criteria: age >/= 65, BMI >/= 25, GUILLERMO.  Patient has no known hypersensitivity to any ingredient of the monoclonal antibody.  Ordering provider has documented that they obtained verbal consent and discussion of FDA EUA Fact Sheet for Patients and Caregivers (physical copy will be provided at infusion site).    Thank you for involving pharmacy in this patient's care. Please contact pharmacy with any questions or concerns.                           Jenny Beltran, Pharm.D., [facility]   Clinical Staff Pharmacist  Phone Extension #[phone]  08/20/21 13:20 EDT

## 2021-08-22 PROBLEM — J96.01 ACUTE HYPOXEMIC RESPIRATORY FAILURE (HCC): Status: ACTIVE | Noted: 2021-01-01

## 2021-09-01 NOTE — SIGNIFICANT NOTE
09/01/21 1020   OTHER   Discipline occupational therapist   Rehab Time/Intention   Session Not Performed unable to treat, medical status change;other (see comments)  (Per RN, possible intubation within next 24 hours due to worsening respiratory status. Pt remains on 100% Fi02 with sats at 88-91%. OT will hold for a few days to reassess medical status.)   Recommendation   OT - Next Appointment 09/03/21

## 2021-09-01 NOTE — NURSING NOTE
Pt OOBTC this am, O2 decreased to 78% but recovered back to 88%. Stood pt to BSC and patient remained at 81% for some time, Pt placed back in bed and remained on 100% Bipap throughout day. Appetite decreased, but able to tolerate Boost x2. Pt had BM x2 with one incontinent episode. UOP adequate. Pt states he 'just feels really weak today'.

## 2021-09-01 NOTE — PROGRESS NOTES
Dr. JORDAN Hurtado    [facility] CORONARY CARE    9/1/2021    Patient ID:  Name:  Nilson Buchanan  MRN:  [id number]  1950  71 y.o.  male            CC/Reason for visit: COVID-19 pneumonia, acute respiratory failure    Interval hx: Patient not doing well.  Unfortunately transition from bed to chair caused severe and prolonged oxygen desaturation in the low 80% range.  It took quite some time for his oxygen saturation to increase up to 88%.  He has been requiring combination of heated high flow nasal cannula OptiFlow, nonrebreather mask over top and sometimes noninvasive ventilation.  Patient is very tired.  Poor appetite.  Flat affect.  Low mood.  Some cough.    ROS: No chest pain, no abdominal pain    Vitals:  Vitals:    09/01/21 1110 09/01/21 1200 09/01/21 1300 09/01/21 1600   BP:  135/80 122/69 133/71   BP Location:       Patient Position:       Pulse: 67 66 76 75   Resp: 28      Temp:   98 °F (36.7 °C)    TempSrc:   Axillary    SpO2: 90% 92% 91% 91%   Weight:       Height:               Body mass index is 31.08 kg/m².    Intake/Output Summary (Last 24 hours) at 9/1/2021 1620  Last data filed at 9/1/2021 0500  Gross per 24 hour   Intake 300 ml   Output 1075 ml   Net -775 ml       Exam:  GEN:  Appears tired, fatigue, appears defeated  Alert, oriented x 2, BiPAP mask covering entire face, difficult to have a conversation with the patient.  LUNGS: Distant and diminished breath sounds bilat, no use of accessory muscles  CV:  Normal S1S2, without murmur, no edema  ABD:  Non tender, no enlarged liver or masses      Scheduled meds:  aspirin, 81 mg, Oral, Daily  dexamethasone, 6 mg, Intravenous, Q12H  enoxaparin, 40 mg, Subcutaneous, Q12H  famotidine, 20 mg, Oral, BID AC  fluticasone, 2 spray, Each Nare, Daily  insulin regular, 0-7 Units, Subcutaneous, Q6H  levothyroxine, 150 mcg, Oral, Q AM  melatonin, 10 mg, Oral, Nightly  senna-docusate sodium, 2 tablet, Oral, BID  sodium chloride, 10 mL, Intravenous,  Q12H  tamsulosin, 0.4 mg, Oral, Daily      IV meds:                           Data Review:   I reviewed the patient's medications and new clinical results.    COVID19   Date Value Ref Range Status   08/22/2021 Detected (C) Not Detected - Ref. Range Final         Lab Results   Component Value Date    CALCIUM 8.6 09/01/2021    PHOS 4.4 09/01/2021    MG 2.4 09/01/2021    MG 2.3 08/31/2021    MG 2.1 08/30/2021     Results from last 7 days   Lab Units 09/01/21  0149 09/01/21  0148 08/31/21  0415 08/30/21  0229   SODIUM mmol/L 133*  --  134* 132*   POTASSIUM mmol/L 5.0  --  5.6* 4.6   CHLORIDE mmol/L 98  --  98 98   CO2 mmol/L 23.0  --  25.0 23.5   BUN mg/dL 28*  --  25* 22   CREATININE mg/dL 0.94  --  1.07 0.96   CALCIUM mg/dL 8.6  --  8.7 8.4*   BILIRUBIN mg/dL 1.6*  --  1.6* 1.3*   ALK PHOS U/L 96  --  106 113   ALT (SGPT) U/L 103*  --  90* 77*   AST (SGOT) U/L 73*  --  72* 68*   GLUCOSE mg/dL 150*  --  148* 153*   WBC 10*3/mm3  --  13.82* 13.19* 13.70*   HEMOGLOBIN g/dL  --  14.8 14.3 14.3   PLATELETS 10*3/mm3  --  233 249 244   PROBNP pg/mL 94.2  --   --   --    PROCALCITONIN ng/mL 0.12  --   --   --              ASSESSMENT:   Acute hypoxemic respiratory failure (CMS/HCC)  COVID-19 pneumonia  Hyperkalemia  Hyponatremia  Acute kidney injury  Obesity  Increased liver enzymes  Steroid-induced hyperglycemia      PLAN:  Now doing well.  Clinically worse.  Respiratory status remains severely compromised, requiring 100% FiO2, noninvasive ventilation.  We discussed once again mechanical ventilation and intubation.  The patient expresses understanding and wishes to proceed if his oxygenation does not improve.  Continue dexamethasone.  Liver enzymes are mildly elevated but have not worsened.  Continue insulin for hyperglycemia.  Patient continues with leukocytosis.  Monitor temperature curve and order procalcitonin if concern for infection.  Sodium has trended down somewhat.  We will continue to monitor.    Total critical care  time 31 minutes excluding any separately billable procedure time      Milind Hurtado MD  9/1/2021

## 2021-09-01 NOTE — PLAN OF CARE
Goal Outcome Evaluation:  Plan of Care Reviewed With: patient   PT REMAIN ON BIPAP %. WITH SAT 88-91%. NO S/S RESP DISTRESS NOTED TONIGHT. NO C/O PAIN. WILL CONT TO MONITOR PT PERIODICALLY.

## 2021-09-01 NOTE — SIGNIFICANT NOTE
09/01/21 1311   OTHER   Discipline physical therapist   Rehab Time/Intention   Session Not Performed other (see comments)  (Pt sats dropped this am w transferring to chair and BSC.  Per RN pt had difficult time recovering.  PT offered bed ex to pt however pt refused. PT will follow up 9/3.)   Recommendation   PT - Next Appointment 09/03/21

## 2021-09-01 NOTE — PROGRESS NOTES
Adult Nutrition  Assessment/PES    Patient Name:  Nilson Buchanan  YOB: 1950  MRN: [id number]  Admit Date:  8/22/2021    Assessment Date:  9/1/2021    Comments:  Follow up note. Pt on Regular diet with 0-50% intake. Boost supplements offered. Last BM 8/31. Will continue to encourage po and monitor intake.     Reason for Assessment     Row Name 09/01/21 1410          Reason for Assessment    Reason For Assessment  follow-up protocol         Nutrition/Diet History     Row Name 09/01/21 1412          Nutrition/Diet History    Typical Food/Fluid Intake  po 0-50%           Labs/Tests/Procedures/Meds     Row Name 09/01/21 1412          Labs/Procedures/Meds    Lab Results Reviewed  reviewed     Lab Results Comments  na, glu, bun, tbili        Diagnostic Tests/Procedures    Diagnostic Test/Procedure Reviewed  reviewed        Medications    Pertinent Medications Reviewed  reviewed         Physical Findings     Row Name 09/01/21 1412          Physical Findings    Overall Physical Appearance  obese;on oxygen therapy           Nutrition Prescription Ordered     Row Name 09/01/21 1413          Nutrition Prescription PO    Current PO Diet  Regular     Supplement  Boost Plus (Ensure Enlive, Ensure Plus)                 Problem/Interventions:          Intervention Goal     Row Name 09/01/21 1413          Intervention Goal    General  Maintain nutrition;Disease management/therapy;Reduce/improve symptoms     PO  PO intake (%)     PO Intake %  75 %     Weight  No significant weight loss         Nutrition Intervention     Row Name 09/01/21 1413          Nutrition Intervention    RD/Tech Action  Follow Tx progress;Care plan reviewd           Education/Evaluation     Row Name 09/01/21 1413          Monitor/Evaluation    Monitor  Per protocol;PO intake;Supplement intake;Pertinent labs;Weight;Skin status           Electronically signed by:  Marlin Verma RD  09/01/21 14:14 EDT

## 2021-09-02 NOTE — PLAN OF CARE
Goal Outcome Evaluation:  Plan of Care Reviewed With: patient         A/O x4, VSS, sats well maintained with bipap on. Pt only dropped to low 80's in O2 sats when taking po meds but was able to quickly recover. Bmx1. See AM labs, will continue to monitor

## 2021-09-02 NOTE — PROGRESS NOTES
Continued Stay Note  [facility]     Patient Name: Nilson Buchanan  MRN: [id number]  Today's Date: 9/2/2021    Admit Date: 8/22/2021    Discharge Plan     Row Name 09/02/21 0837       Plan    Plan  Discharge plans pending clinical course    Plan Comments  Pt is requiring 100% FiO2, on Bi PAP.  CCP following for DC needs  May need oxygen        Discharge Codes    No documentation.             Rochelle Howard RN

## 2021-09-02 NOTE — PROGRESS NOTES
"  Daily Progress Note.   [facility] CORONARY CARE  9/2/2021    Patient:  Name:  iNlson Buchanan  MRN:  [id number]  1950  71 y.o.  male         CC/Reason for visit: COVID-19 pneumonia, acute respiratory failure    Interval History:  Maintains on bipap 100%  Not tolerating hhf  Awake alert  Denies chest pain denies leg pain  Not able to take much breaks of bipap  Not much po intake    Physical Exam:  /74   Pulse 74   Temp 96.3 °F (35.7 °C) (Axillary)   Resp 24   Ht 172.7 cm (67.99\")   Wt 91.7 kg (202 lb 2.6 oz)   SpO2 90%   BMI 30.75 kg/m²   Body mass index is 30.75 kg/m².    Intake/Output Summary (Last 24 hours) at 9/2/2021 1205  Last data filed at 9/2/2021 0300  Gross per 24 hour   Intake --   Output 475 ml   Net -475 ml   on bipap 100%  General appearance: ill but non toxic  Eyes: anicteric sclerae, moist conjunctivae; no lid-lag;    HENT: Atraumatic; oropharynx limited by bipap  Neck: Trachea midline; FROM   Lungs: bilateral air entry with normal respiratory effort and no intercostal retractions  CV: RRR, no rub  Abdomen: soft non rigid  Extremities: No peripheral edema or extremity lymphadenopathy  Skin: wwp no diffuse rash  Psych: Appropriate affect, alert  Neuro moves all ext, cns 2-12 intact      Data Review:  Notable Labs:  Results from last 7 days   Lab Units 09/02/21  0351 09/01/21  0148 08/31/21 0415 08/30/21 0229 08/29/21 0229 08/28/21  1645 08/28/21  0246   WBC 10*3/mm3 14.71* 13.82* 13.19* 13.70* 12.40* 16.44* 14.48*   HEMOGLOBIN g/dL 16.1 14.8 14.3 14.3 13.8 14.0 15.0   PLATELETS 10*3/mm3 234 233 249 244 214 214 218     Results from last 7 days   Lab Units 09/02/21  0351 09/01/21  0149 08/31/21  0415 08/30/21 0229 08/29/21  0229 08/28/21  0246 08/27/21  0507   SODIUM mmol/L 132* 133* 134* 132* 137 133* 137   POTASSIUM mmol/L 5.0 5.0 5.6* 4.6 4.7 4.1 4.3   CHLORIDE mmol/L 97* 98 98 98 102 99 102   CO2 mmol/L 23.3 23.0 25.0 23.5 25.4 25.7 27.9   BUN mg/dL 29* 28* 25* 22 " 21 19 21   CREATININE mg/dL 1.07 0.94 1.07 0.96 1.05 1.07 1.26   GLUCOSE mg/dL 135* 150* 148* 153* 179* 116* 96   CALCIUM mg/dL 9.0 8.6 8.7 8.4* 7.9* 8.2* 7.9*   MAGNESIUM mg/dL 2.6* 2.4 2.3 2.1 2.3 2.0 2.0   PHOSPHORUS mg/dL 4.0 4.4 4.7* 3.7 3.2 3.6 2.8   Estimated Creatinine Clearance: 69.6 mL/min (by C-G formula based on SCr of 1.07 mg/dL).    Results from last 7 days   Lab Units 09/02/21  0351 09/01/21  0149 09/01/21  0148 08/31/21  0415 08/30/21  0229 08/30/21 0229   AST (SGOT) U/L 65* 73*  --  72*   < > 68*   ALT (SGPT) U/L 110* 103*  --  90*   < > 77*   PROCALCITONIN ng/mL  --  0.12  --   --   --   --    FERRITIN ng/mL  --  1,639.00*  --  1,599.00*  --  1,596.00*   D DIMER QUANT MCGFEU/mL 5.01*  --  4.73* 3.67*   < > 5.74*   CRP mg/dL  --  <0.30  --  <0.30  --  <0.30   PLATELETS 10*3/mm3 234  --  233 249   < > 244    < > = values in this interval not displayed.             Imaging:  Reviewed chest images personally from past 3 days    Scheduled meds:    aspirin, 81 mg, Oral, Daily  dexamethasone, 6 mg, Intravenous, Q12H  enoxaparin, 40 mg, Subcutaneous, Q12H  famotidine, 20 mg, Oral, BID AC  fluticasone, 2 spray, Each Nare, Daily  insulin regular, 0-7 Units, Subcutaneous, Q6H  levothyroxine, 150 mcg, Oral, Q AM  melatonin, 10 mg, Oral, Nightly  senna-docusate sodium, 2 tablet, Oral, BID  sodium chloride, 10 mL, Intravenous, Q12H  tamsulosin, 0.4 mg, Oral, Daily        ASSESSMENT  /  PLAN:  Acute hypoxemic respiratory failure (CMS/HCC)  COVID-19 pneumonia  ARDS  Hyperkalemia improved  Hyponatremia  Acute kidney injury  Obesity  Transaminitis - seen in covid  Steroid-induced hyperglycemia    Repeat cxr  Maintaining on bipap however max settings at present.  Worry that intubation may be needed.  Discussed with patient and patients wife. They both wish to delay intubation as long as possible.    S/p remdesivir  Decadron 6mg bid  Cont trend inflamm markers  procal remains low  crp low as well ferritin  elevated    Worrisome hypoxia.  Poor prognosis    Discussed with patient and daughter at his permission request multidisciplinary team on am ICU rounds.  Did let family I worry that iwthin 24-48 hours intubation may be needed.    Total critical care time was 40 minutes, excluding any separately billable procedure time.  Time did not overlap with any other provider.        William Castro MD  [facility]  09/02/21  [id number]

## 2021-09-03 NOTE — SIGNIFICANT NOTE
09/03/21 0857   OTHER   Discipline physical therapist   Rehab Time/Intention   Session Not Performed other (see comments)  (Per Nurse Melo, pt not appropriate for PT today; pt on 100% bipap, desats quickly, and probably wont tolerate PT. Will f/u 9/7/21)   Recommendation   PT - Next Appointment 09/07/21

## 2021-09-03 NOTE — PROGRESS NOTES
Adult Nutrition  Assessment/PES    Patient Name:  Nilson Buchanan  YOB: 1950  MRN: [id number]  Admit Date:  8/22/2021    Assessment Date:  9/3/2021    Comments:  Follow up note. Pt now NPO and on the vent. Propofol 26.6. OG cortrak placed. When MD ok's, recommend TF's with Intense Pepteman with goal at 50ml/hr. Discussed care in rounds. Will continue to follow.     Reason for Assessment     Row Name 09/03/21 1312          Reason for Assessment    Reason For Assessment  physician consult;TF/PN     Diagnosis  -- on vent now         Nutrition/Diet History     Row Name 09/03/21 1313          Nutrition/Diet History    Typical Food/Fluid Intake  NPO, TF's recommendations given           Labs/Tests/Procedures/Meds     Row Name 09/03/21 1313          Labs/Procedures/Meds    Lab Results Reviewed  reviewed     Lab Results Comments  glu, k, BUN, mg, alt, tbili        Diagnostic Tests/Procedures    Diagnostic Test/Procedure Reviewed  reviewed        Medications    Pertinent Medications Reviewed  reviewed     Pertinent Medications Comments  decadron, lovenox, pepcid, flonase, Insulin, synthroid, pericolace, fentanyl, rita, ppf         Physical Findings     Row Name 09/03/21 1315          Physical Findings    Overall Physical Appearance  on ventilator support     Gastrointestinal  feeding tube     Tubes  orogastric tube         Estimated/Assessed Needs     Row Name 09/03/21 1315          Calculation Measurements    Weight Used For Calculations  88.8 kg (195 lb 12.3 oz)        Estimated/Assessed Needs    Additional Documentation  KCAL/KG (Group);Fluid Requirements (Group);Protein Requirements (Group)        KCAL/KG    KCAL/KG  25 Kcal/Kg (kcal)     25 Kcal/Kg (kcal)  2220        Protein Requirements    Weight Used For Protein Calculations  88.8 kg (195 lb 12.3 oz)     Est Protein Requirement Amount (gms/kg)  1.2 gm protein     Estimated Protein Requirements (gms/day)  106.56        Fluid Requirements    Fluid  Requirements (mL/day)  2220     RDA Method (mL)  2220         Nutrition Prescription Ordered     Row Name 09/03/21 1316          Nutrition Prescription PO    Current PO Diet  NPO        Nutrition Prescription EN    Enteral Route  ND     Product  Peptamen Intense VHP (Vital HP)     TF Delivery Method  Continuous     Continuous TF Goal Rate (mL/hr)  50 mL/hr     Water flush (mL)   30 mL     Water Flush Frequency  Every 4 hours        Propofol Considerations    Propofol (mL/hr)  26.6 mL/hr     Propofol (Kcal/day)  702 Kcal/day         Evaluation of Received Nutrient/Fluid Intake     Row Name 09/03/21 1318          Calories Evaluation    Enteral Calories (kcal)  1200     Other Calories (kcal)  702     Total Calories (kcal)  1902     % of Kcal Needs  85        Protein Evaluation    Enteral Protein (gm)  110     % of Protein Needs  100        Fluid Intake Evaluation    Enteral (Free Water) Fluid (mL)  1008     Free Water Flush Fluid (mL)  180     Total Free Water Intake (mL)  1188 mL        EN Evaluation    HOB  Greater than or equal to 30 degress               Problem/Interventions:    Problem 2     Row Name 09/03/21 1323          Nutrition Diagnoses Problem 2    Problem 2  Needs Alternate Route     Etiology (related to)  Medical Diagnosis             Intervention Goal     Row Name 09/03/21 1324          Intervention Goal    General  Maintain nutrition;Nutrition support treatment;Improved nutrition related lab(s);Reduce/improve symptoms;Meet nutritional needs for age/condition;Disease management/therapy     TF/PN  Inititiate TF/PN;Tolerate TF at goal     Weight  No significant weight loss         Nutrition Intervention     Row Name 09/03/21 1324          Nutrition Intervention    RD/Tech Action  Follow Tx progress;Care plan reviewd;Recommend/ordered     Recommended/Ordered  EN         Nutrition Prescription     Row Name 09/03/21 1324          Nutrition Prescription EN    Enteral Prescription  Enteral begin/change      Product  Peptamen Intense VHP     TF Delivery Method  Continuous     Continuous TF Goal Rate (mL/hr)  50 mL/hr     Water flush (mL)   30 mL     Water Flush Frequency  Every 4 hours         Education/Evaluation     Row Name 09/03/21 3909          Monitor/Evaluation    Monitor  Per protocol;I&O;Pertinent labs;TF delivery/tolerance;Weight;Skin status           Electronically signed by:  Marlin Verma RD  09/03/21 13:25 EDT

## 2021-09-03 NOTE — NURSING NOTE
"Pt friend Ofelia Fonseca called to obtain status update on pt. She did not have access code.  Spoke with Pt, who is alert and oriented x4.  Pt gave permission to speak with Ofelia on medical status, and condition.  Stating, \" you can tell [place] whatever she wants to know.\"  Ofelia updated on patients condition.  "

## 2021-09-03 NOTE — NURSING NOTE
Spoke with Pt at bedside who is alert and Oriented.  Pt verbally confirmed his daughter Rob is POA, and he has a living will and legal paperwork in a lockbox at the [employer] in [facility].  Rob informed of this information.

## 2021-09-03 NOTE — PROCEDURES
Endotracheal Intubation Procedure Note      Indication for endotracheal intubation: respiratory failure.  Sedation: fentanyl and propofol.  Equipment: A video GlideScope was used and Babatunde 3 laryngoscope blade and 8.0mm cuffed endotracheal tube.  Number of attempts: 1.  ETT location confirmed by auscultation, by CXR and ETCO2 monitor.    VC easily visualized with ET tube passed through without difficulty or resistance.      William Castro MD  9/3/2021  [id number]

## 2021-09-03 NOTE — NURSING NOTE
"Long conversation with pt's daughter Rob r/t control of information about her father's condition. Apparently Ofelia Fonseca, pt's roommate, was given permission by the patient himself, to speak with nurse about plan of care without having the access code. Daughter stated that she is trying to safeguard information about the patient's condition but [place] isn't of the same mindset. Also stating that the patient isn't responding to texts from family.     Daughter told that we are unable to countermand the wishes of the patient himself, nor are we capable of coercing the patient into responding to her texts so long as he remains capable of making his own decisions, regardless of who has the access code.    Plan to add a 2nd daughter Chela to emergency contacts, and primary RN speak with patient about the situation        Primary nurse/ charge nurse spoke with patient about above issue. Pt states that \"Gypsy pays the bills, and she knows what to do.\" Also stated that in the event that pt is unable to make his own decisions, that \"Everything is written down for them(his daughters), they'll know what to do.\"    2nd lengthy conversation with pt's daughter Rob relating pt's wishes about [place].     Notified , agreed with plan to continue with current plan until/ if pt is unable to make his own decisions.    "

## 2021-09-03 NOTE — NURSING NOTE
Spoke with Patient he Alert and Oriented.  Pt gave permission to give Friend Ofelia Fonseca information about medical conditions.

## 2021-09-03 NOTE — SIGNIFICANT NOTE
09/03/21 1543   PICC Triple Lumen 09/03/21 Right Basilic   Placement Date/Time: 09/03/21 1542   Hand Hygiene Completed: Yes  Size (Fr): 5  Description (optional): power picc lot no [id number]  exp 07-  Length (cm): 44 cm  Orientation: Right  Location: Basilic  Site Prep: Chlorhexidine isopropyl alcohol  ...   Site Assessment Clean;Dry;Intact   #1 Lumen Status Blood return noted;Capped;Flushed;Normal saline locked   #2 Lumen Status Blood return noted;Capped;Flushed;Normal saline locked   #3 Lumen Status Blood return noted;Capped;Flushed;Normal saline locked   Length renuka (cm) 44 cm   Line Care Connections checked and tightened   Extremity Circumference (cm) 36 cm   Dressing Type Border Dressing;Securing device;Antimicrobial dressing/disc   Dressing Status Clean;Dry;Intact   Dressing Intervention New dressing   Liquid Adhesive Applied   Dressing Change Due 09/10/21   PIC Line tip in SVC per 3CG technology

## 2021-09-03 NOTE — SIGNIFICANT NOTE
09/03/21 0922   OTHER   Discipline occupational therapist   Rehab Time/Intention   Session Not Performed unable to treat, medical status change  (RN states hold due to O2 needs, per chart possible intubation.)   Recommendation   OT - Next Appointment 09/07/21

## 2021-09-03 NOTE — PROGRESS NOTES
"  Daily Progress Note.   [facility] CORONARY CARE  9/3/2021    Patient:  Name:  Nilson Buchanan  MRN:  [id number]  1950  71 y.o.  male         CC/Reason for visit: COVID-19 pneumonia, acute respiratory failure    Interval History:  Maintains on bipap 100%     State he is tired. Worn out exhausted.  Not much nutrition  sats 87% on bipap 100%  hasnt tolerated breaks off bipap    Physical Exam:  /69   Pulse 81   Temp 96.7 °F (35.9 °C)   Resp (!) 29   Ht 172.7 cm (67.99\")   Wt 88.8 kg (195 lb 12.3 oz)   SpO2 94%   BMI 29.77 kg/m²   Body mass index is 29.77 kg/m².    Intake/Output Summary (Last 24 hours) at 9/3/2021 0944  Last data filed at 9/3/2021 0500  Gross per 24 hour   Intake 342 ml   Output 575 ml   Net -233 ml   on bipap 100%  General appearance: ill but non toxic fatigued  Eyes: anicteric sclerae, moist conjunctivae; no lid-lag;    HENT: Atraumatic; oropharynx limited by bipap  Neck: Trachea midline   Lungs: bilateral air entry with shallowrespiratory effort and no intercostal retractions  CV: RRR, no rub  Abdomen: soft non rigid  Extremities: No peripheral edema or extremity lymphadenopathy  Skin: wwp no diffuse rash  Psych: Appropriate affect, alert  Neuro moves all ext, cns 2-12 intact      Data Review:  Notable Labs:  Results from last 7 days   Lab Units 09/03/21  0841 09/02/21 0351 09/01/21 0148 08/31/21 0415 08/30/21 0229 08/29/21 0229 08/28/21  1645   WBC 10*3/mm3 11.14* 14.71* 13.82* 13.19* 13.70* 12.40* 16.44*   HEMOGLOBIN g/dL 15.4 16.1 14.8 14.3 14.3 13.8 14.0   PLATELETS 10*3/mm3 221 234 233 249 244 214 214     Results from last 7 days   Lab Units 09/02/21  0351 09/01/21  0149 08/31/21 0415 08/30/21 0229 08/29/21 0229 08/28/21  0246   SODIUM mmol/L 132* 133* 134* 132* 137 133*   POTASSIUM mmol/L 5.0 5.0 5.6* 4.6 4.7 4.1   CHLORIDE mmol/L 97* 98 98 98 102 99   CO2 mmol/L 23.3 23.0 25.0 23.5 25.4 25.7   BUN mg/dL 29* 28* 25* 22 21 19   CREATININE mg/dL 1.07 0.94 " 1.07 0.96 1.05 1.07   GLUCOSE mg/dL 135* 150* 148* 153* 179* 116*   CALCIUM mg/dL 9.0 8.6 8.7 8.4* 7.9* 8.2*   MAGNESIUM mg/dL 2.6* 2.4 2.3 2.1 2.3 2.0   PHOSPHORUS mg/dL 4.0 4.4 4.7* 3.7 3.2 3.6   Estimated Creatinine Clearance: 68.6 mL/min (by C-G formula based on SCr of 1.07 mg/dL).    Results from last 7 days   Lab Units 09/03/21  0841 09/02/21  0351 09/01/21  0149 09/01/21  0148 08/31/21  0415 08/31/21 0415 08/30/21 0229 08/30/21 0229   AST (SGOT) U/L  --  65* 73*  --   --  72*   < > 68*   ALT (SGPT) U/L  --  110* 103*  --   --  90*   < > 77*   PROCALCITONIN ng/mL  --   --  0.12  --   --   --   --   --    FERRITIN ng/mL  --   --  1,639.00*  --   --  1,599.00*  --  1,596.00*   D DIMER QUANT MCGFEU/mL 3.29* 5.01*  --  4.73*   < > 3.67*   < > 5.74*   CRP mg/dL  --   --  <0.30  --   --  <0.30  --  <0.30   PLATELETS 10*3/mm3 221 234  --  233   < > 249   < > 244    < > = values in this interval not displayed.             Imaging:  Reviewed chest images personally from past 3 days    Scheduled meds:    aspirin, 81 mg, Oral, Daily  dexamethasone, 6 mg, Intravenous, Q12H  enoxaparin, 40 mg, Subcutaneous, Q12H  famotidine, 20 mg, Oral, BID AC  fluticasone, 2 spray, Each Nare, Daily  insulin regular, 0-7 Units, Subcutaneous, Q6H  levothyroxine, 150 mcg, Oral, Q AM  melatonin, 10 mg, Oral, Nightly  senna-docusate sodium, 2 tablet, Oral, BID  sodium chloride, 10 mL, Intravenous, Q12H  tamsulosin, 0.4 mg, Oral, Daily        ASSESSMENT  /  PLAN:  Acute hypoxemic respiratory failure   COVID-19 pneumonia  ARDS  Hyperkalemia improved  Hyponatremia  Acute kidney injury  Obesity  Transaminitis - seen in covid  Steroid-induced hyperglycemia    Repeat cxr reviewed - lung fields improved, bnp is only 94, no clinical volume overload and he has little intake over preceeding days. dont think interstitial edema likely.   With this cxr and elevated dimer will increase Lovenox to therapeutic levels.  Discussed rational with patients  daughter today.    Declining despite bipap, no nutrition, he states he is tired.  Wishes to proceed with intubation.  I did discuss with patient that sometimes this doesn't fix but that it would give us a shot.    S/p remdesivir  Decadron 6mg bid  Cont trend inflamm markers  procal remains low  crp low as well ferritin elevated    Await AM labs - ordered , still pending    Worrisome hypoxia.  Poor prognosis    Discussed with patient and daughter at his permission request multidisciplinary team on am ICU rounds.      Discussed with POC family his daughter, Rob Miller.  Explained fathers wishes to proceed with intubation and discussed the risk with intubation process, with mech ventilation and the poor prognosis of patients with covid on mech ventilation.  She expressed her understanding and did wish to proceed.  She wished to be able to facetime with her dad and sister prior to intubation.  I think this is a great idea and very appropriate.  RN will help facilitate, he has already been in contact with family in plans for this.     Total critical care time was 45 minutes, excluding any separately billable procedure time.  Time did not overlap with any other provider.        William Castro MD  [facility]  09/03/21  [id number]

## 2021-09-03 NOTE — PLAN OF CARE
Goal Outcome Evaluation:  Plan of Care Reviewed With: patient, daughter         A/O x4, VSS, patients O2 sats dropped to 79 at the lowest when taking po meds/sips and he was able to recover quickly. Output was 575. Patients daughter is concerned that she has not been able to communicate with her father and after the discussion of the possible intubation in the near future wants to be able to get his affairs in order before he cant communicate and it has caused a bit of concern (see charge nurse notes). Pt has repeatedly been a hard stick so was put in for lab collect and has yet to have labs drawn this AM.

## 2021-09-04 NOTE — PROGRESS NOTES
LOS: 13 days   Patient Care Team:  Marco Kenney MD as PCP - General (Internal Medicine)    Subjective     Patient is new to me today I am picking up from Dr. William Castro.  I was asked by nursing to evaluate staff because of high airway pressure alarms.  I have reviewed multiple records [facility] notes.  Patient has COVID-19 pneumonia and respiratory failure with ARDS he was unvaccinated.  He is a former 30-year smoker, he has sleep apnea and hypothyroidism.      Review of Systems:          Objective     Vital Signs  Vital Sign Min/Max for last 24 hours  Temp  Min: 97.9 °F (36.6 °C)  Max: 98.9 °F (37.2 °C)   BP  Min: 78/60  Max: 138/73   Pulse  Min: 67  Max: 105   Resp  Min: 26  Max: 29   SpO2  Min: 85 %  Max: 95 %   No data recorded   Weight  Min: 88.2 kg (194 lb 7.1 oz)  Max: 88.2 kg (194 lb 7.1 oz)        Ventilator/Non-Invasive Ventilation Settings (From admission, onward)     Start     Ordered    09/04/21 0530  Ventilator - AC/VC; (28); 100; 90%; Other (see comments); 18; 550  Continuous     Question Answer Comment   Vent Mode AC/VC    Breath rate  28   FiO2 100    FiO2 titrate for Sp02% =/> 90%    PEEP Other (see comments)    PEEP: 18    Tidal Volume 550        09/04/21 0529    09/03/21 1551  Ventilator - AC/PC; (26); 100; Other (see comments); 18; 18  Continuous,   Status:  Canceled     Question Answer Comment   Vent Mode AC/PC    Breath rate  26   FiO2 100    PEEP Other (see comments)    PEEP: 18    Inspiratory Pressure 18        09/03/21 1550    08/25/21 0550  NIPPV (CPAP or BIPAP)  Until Discontinued,   Status:  Canceled     Question Answer Comment   Indication: Acute Respiratory Failure    Type: AVAPS/PC/PS    NIPPV Mask Interface: Per Patient Preference    Backup Rate 20    Target VT (mL) 550    EPAP/PEEP (cm H2O) 8    Min Pressure (cm H2O) 10    Max Pressure (cm H2O) 24    Titrate for SPO2 Greater Than or Equal To    Titrate for SPO2 88%    Titrate for SPO2 Per Policy        08/25/21 0550     08/23/21 1341  NIPPV (CPAP or BIPAP)  Until Discontinued,   Status:  Canceled     Question Answer Comment   Indication: Sleep Apnea    Type: BIPAP    NIPPV Mask Interface: Per Patient Preference    Titrate for SPO2 Greater Than or Equal To    Titrate for SPO2 88%    Titrate for SPO2 Per Policy        08/23/21 1340                             Body mass index is 29.57 kg/m².  I/O last 3 completed shifts:  In: 2615.8 [P.O.:342; I.V.:2029.8; Other:120; NG/GT:124]  Out: 1950 [Urine:1950]  No intake/output data recorded.        Physical Exam:  General Appearance: Well-developed white male he is orally intubated on a ventilator he has a 7-1/2 endotracheal tube at about 24 cm the lips.  He is on assist control tidal volume 600 PEEP of 18,, respiratory rate of 28 peak inspiratory flow rate is 78 plateau pressures in the upper 40s peak pressures are alarming at over 50 patient is heavily sedated and unresponsive on propofol at 50 mics and fentanyl at 300  Eyes: Conjunctiva are clear and anicteric pupils are about 2-1/2 mm and equal bilaterally  ENT: He has the oral endotracheal tube and he has a enteric feeding tube as well mucous membranes are dry  Neck: No adenopathy or thyromegaly no jugular venous tension, trachea midline  Lungs: Clear nonlabored symmetric expansion no wheezes no rales no rhonchi no crepitance.  He is breathing with the ventilator he does not appear to be fighting the ventilator significantly  Cardiac: Regular rate rhythm no murmur  Abdomen: Soft nontender no palpable hepatosplenomegaly or masses he has active bowel sounds seems to be tolerating tube feeds well  : Erazo cath dependent drainage clear yellow urine  Musculoskeletal: I do not palpate any lower extremity cords.  He does not have any significant lower extremity edema  Skin: No jaundice no petechiae skin is warm and dry  Neuro: Heavily sedated and unresponsive even to sternal rub  Extremities/P Vascular: No clubbing no cyanosis no edema  palpable radial and dorsalis pedis pulses bilaterally  MSE: Unable to assess       Labs:  Results from last 7 days   Lab Units 09/04/21  0335 09/03/21  0841 09/02/21  0351 09/01/21  0149 08/31/21  0415 08/30/21 0229 08/29/21 0229   GLUCOSE mg/dL  --  107* 135* 150* 148* 153* 179*   SODIUM mmol/L  --  137 132* 133* 134* 132* 137   POTASSIUM mmol/L  --  5.5* 5.0 5.0 5.6* 4.6 4.7   MAGNESIUM mg/dL 2.2 2.7* 2.6* 2.4 2.3 2.1 2.3   CO2 mmol/L  --  25.2 23.3 23.0 25.0 23.5 25.4   CHLORIDE mmol/L  --  99 97* 98 98 98 102   ANION GAP mmol/L  --  12.8 11.7 12.0 11.0 10.5 9.6   CREATININE mg/dL  --  1.01 1.07 0.94 1.07 0.96 1.05   BUN mg/dL  --  28* 29* 28* 25* 22 21   BUN / CREAT RATIO   --  27.7* 27.1* 29.8* 23.4 22.9 20.0   CALCIUM mg/dL  --  8.8 9.0 8.6 8.7 8.4* 7.9*   EGFR IF NONAFRICN AM mL/min/1.73  --  73 68 79 68 77 70   ALK PHOS U/L  --  86 98 96 106 113 112   TOTAL PROTEIN g/dL  --  6.2 6.6 5.9* 6.0 5.5* 5.0*   ALT (SGPT) U/L  --  131* 110* 103* 90* 77* 64*   AST (SGOT) U/L  --  66* 65* 73* 72* 68* 53*   BILIRUBIN mg/dL  --  1.2 1.4* 1.6* 1.6* 1.3* 1.3*   ALBUMIN g/dL  --  3.80 4.10 3.50 3.80 3.40* 3.20*   GLOBULIN gm/dL  --  2.4 2.5 2.4 2.2 2.1 1.8     Estimated Creatinine Clearance: 72.4 mL/min (by C-G formula based on SCr of 1.01 mg/dL).      Results from last 7 days   Lab Units 09/04/21  0335 09/03/21  0841 09/02/21  0351 09/01/21  0148 08/31/21  0415 08/30/21  0229 08/29/21  0229   WBC 10*3/mm3 14.55* 11.14* 14.71* 13.82* 13.19* 13.70* 12.40*   RBC 10*6/mm3 3.88* 4.84 5.08 4.63 4.54 4.48 4.41   HEMOGLOBIN g/dL 12.8* 15.4 16.1 14.8 14.3 14.3 13.8   HEMATOCRIT % 36.5* 45.1 48.3 43.7 42.1 41.9 41.5   MCV fL 94.1 93.2 95.1 94.4 92.7 93.5 94.1   MCH pg 33.0 31.8 31.7 32.0 31.5 31.9 31.3   MCHC g/dL 35.1 34.1 33.3 33.9 34.0 34.1 33.3   RDW % 11.9* 12.3 12.6 13.2 12.9 12.9 12.9   RDW-SD fl 40.8 41.6 44.9 45.4 44.0 44.5 44.5   MPV fL 10.3 9.8 9.3 9.3 9.6 9.6 9.6   PLATELETS 10*3/mm3 232 221 234 233 249 244 214    NEUTROPHIL % %  --  88.1* 93.4* 93.9* 93.5* 94.3* 94.4*   LYMPHOCYTE % %  --  6.5* 3.2* 2.9* 3.2* 3.6* 3.3*   MONOCYTES % %  --  3.1* 2.7* 2.5* 2.2* 1.2* 1.1*   EOSINOPHIL % %  --  1.6 0.1* 0.0* 0.1* 0.1* 0.2*   BASOPHIL % %  --  0.4 0.3 0.1 0.2 0.1 0.2   IMM GRAN % %  --  0.3 0.3 0.6* 0.8* 0.7* 0.8*   NEUTROS ABS 10*3/mm3 11.35* 9.83* 13.73* 12.98* 12.35* 12.92* 11.70*   LYMPHS ABS 10*3/mm3  --  0.72 0.47* 0.40* 0.42* 0.49* 0.41*   MONOS ABS 10*3/mm3  --  0.34 0.40 0.34 0.29 0.17 0.14   EOS ABS 10*3/mm3 0.44* 0.18 0.02 0.00 0.01 0.01 0.02   BASOS ABS 10*3/mm3  --  0.04 0.04 0.02 0.02 0.02 0.03   IMMATURE GRANS (ABS) 10*3/mm3  --  0.03 0.05 0.08* 0.10* 0.09* 0.10*   NRBC /100 WBC  --  0.0 0.0 0.0 0.0 0.0 0.0     Results from last 7 days   Lab Units 09/04/21  0552   PH, ARTERIAL pH units 7.156*   PO2 ART mm Hg 109.6*   PCO2, ARTERIAL mm Hg 77.0*   HCO3 ART mmol/L 27.2         Results from last 7 days   Lab Units 09/03/21  0841 09/01/21  0149   PROBNP pg/mL 66.5 94.2         Results from last 7 days   Lab Units 09/01/21  0149   PROCALCITONIN ng/mL 0.12         Microbiology Results (last 10 days)     ** No results found for the last 240 hours. **              aspirin, 81 mg, Oral, Daily  dexamethasone, 6 mg, Intravenous, Q12H  enoxaparin, 1 mg/kg, Subcutaneous, Q12H  famotidine, 20 mg, Oral, BID AC  fluticasone, 2 spray, Each Nare, Daily  insulin regular, 0-7 Units, Subcutaneous, Q6H  levothyroxine, 150 mcg, Oral, Q AM  melatonin, 10 mg, Oral, Nightly  senna-docusate sodium, 2 tablet, Oral, BID  sodium chloride, 500 mL, Intravenous, Once  sodium chloride, 10 mL, Intravenous, Q12H  sodium chloride, 10 mL, Intravenous, Q12H  sodium chloride, 10 mL, Intravenous, Q12H  sodium chloride, 10 mL, Intravenous, Q12H  tamsulosin, 0.4 mg, Oral, Daily      fentaNYL citrate-NaCl,  mcg/hr, Last Rate: 300 mcg/hr (09/04/21 7673)  phenylephrine, 0.5-3 mcg/kg/min, Last Rate: 2 mcg/kg/min (09/04/21 9583)  propofol, 5-50  mcg/kg/min, Last Rate: 50 mcg/kg/min (09/04/21 0711)        Diagnostics:  XR Chest 1 View    Result Date: 9/4/2021  SINGLE VIEW OF THE CHEST  HISTORY: Heart failure  COMPARISON: 09/03/2021  FINDINGS: Endotracheal tube is in satisfactory position. Right-sided PICC line extends into the superior vena cava cava. Weighted enteric feeding tube is coiled upon itself, with the tip directed towards the cardia of the stomach. Cardiomegaly is present. Bilateral alveolar and interstitial infiltrates are unchanged. No pneumothorax. Lung volumes remain diminished, with bibasilar atelectasis noted. No pneumothorax or definite effusion is seen.      No significant interval change in bilateral pulmonary infiltrates.  This report was finalized on 9/4/2021 5:22 AM by Dr. Rhonda Dsouza M.D.      XR Chest 1 View    Result Date: 9/3/2021  PORTABLE CHEST 09/03/2021 AT 1151 HOURS  CLINICAL HISTORY: Evaluate ET tube placement.  Compared to the previous chest dated 09/02/2021.  In the interval, an endotracheal tube has been placed and appears in satisfactory position with its tip a few centimeters above the maxine. There are extensive diffuse interstitial and airspace infiltrates throughout both lungs consistent with pneumonia that show slight interval worsening. There are no pleural effusions. The heart remains normal in size.  This report was finalized on 9/3/2021 12:27 PM by Dr. Edmar Ibarra M.D.      XR Chest 1 View    Result Date: 9/2/2021  XR CHEST 1 VW-  09/20/2021  HISTORY: Shortness of breath. Covid.  Heart size is within normal limits. Lungs are underinflated. There is moderate bilateral interstitial disease which could represent pulmonary edema or interstitial pneumonia. The right lung has cleared somewhat since the previous study of 08/22/2021 while the interstitial disease on the left appears slightly more severe.  No pneumothorax is seen.      There is moderate bilateral interstitial disease which appears somewhat  atypical for Covid pneumonia though this still could account for this finding. Another possibility would be moderate interstitial pulmonary edema. Please correlate. 2. The right lung has cleared somewhat since the previous study of 08/22/2021 while the interstitial disease on the left appears more severe.  This report was finalized on 9/2/2021 1:46 PM by Dr. Nathanael Landaverde M.D.      XR Chest 1 View    Result Date: 8/22/2021  XR CHEST 1 VW-  HISTORY:  Shortness of breath, Covid positive.  COMPARISON:  Chest radiograph 08/18/2021.  FINDINGS:  A single portable view of the chest was obtained. The cardiac silhouette and mediastinal and hilar contours are not significantly changed. There are diffuse patchy coarse opacities throughout both lungs, right greater than left, significantly progressed from 08/18/2021, which are most compatible with multifocal pneumonia in the setting of known Covid. There are questioned trace bilateral pleural effusions. There is multilevel degenerative disc disease.  This report was finalized on 8/22/2021 6:39 PM by Dr. Daniella Bland M.D.      Duplex Venous Lower Extremity - Bilateral CAR    Result Date: 8/29/2021  · Normal bilateral lower extremity venous duplex scan.      XR Chest PA & Lateral    Result Date: 8/18/2021  XR CHEST PA AND LATERAL-  HISTORY:  Covid positive.  COMPARISON:  Chest radiograph 11/20/2014  FINDINGS:  2 views of the chest were obtained.  Support Devices:  None. Cardiac Silhouette/Mediastinum/Fanny:  The cardiac, mediastinal, and hilar contours are within normal limits and not significantly changed. There is calcific aortic atherosclerosis. Lungs/Pleural Spaces:  There are low lung volumes. There are subtle peripheral airspace opacities in both lungs, suspicious for multifocal pneumonia in the setting of known Covid. Pleural spaces are clear. Chest Wall/Diaphragm/Upper Abdomen:  There is multilevel degenerative disc disease. There are surgical clips projecting over  the upper abdomen on the lateral view.  This report was finalized on 8/18/2021 11:56 AM by Dr. Daniella Bland M.D.          Chest x-ray reviewed diffuse increased interstitial markings bilaterally endotracheal tube in good position a few centimeters above the main maxine there is a feeding tube terminating in the stomach no seen pneumothorax or pneumomediastinum.    Active Hospital Problems    Diagnosis  POA   • Acute hypoxemic respiratory failure (CMS/HCC) [J96.01]  Yes      Resolved Hospital Problems   No resolved problems to display.         Assessment/Plan     1. COVID-19 infection and pneumonia he is on remdesivir with this in the last day and high-dose dexamethasone 6 mg twice daily given severity of his illness he probably should try even higher dose the 10 mg twice daily.  He did receive Actemra back on the 23rd   2. Acute hypoxic respiratory failure with ARDS he has a respiratory acidosis.  I am making multiple changes to his ventilator I am decreasing his tidal volumes down to around 7 mL/kg ideal body weight and increasing his respiratory rate to try and maintain her slightly increase his minute ventilation increased his inspiratory time some.  He still has quite high plateau pressures New Order 43 with 18 cm of PEEP I am to go ahead and paralyze him and prone him hoping we can wean O2 and PEEP eventually.  We will follow up blood gases with regards to his acidosis.  To be determined deteriorating this far out the course of his disease is extremely poor prognostic factor afraid he is possibly going into the fibrosis and stage of ARDS steroids may help in this situation  3. Acute kidney injury  4. Covid related hepatitis with mild elevations in transaminases  5. Steroid-induced hyperglycemia  6. Hyponatremia will need to continue monitoring  7. Hyperkalemia resolved  8. Fluids/lites/nutrition patient is on tube feeds  9. Elevated D-dimer was elevated on admission and has been falling he is getting full dose  Lovenox.  He is too unstable to transport for CTA and since he is getting full dose treatment he probably will not change therapy I am going to get lower extremity Dopplers and will be nice to know for sure if there are any clots or not        Addendum: Arterial gases worsening CO2 retention and acidosis improved oxygenation with the changes made.  We have not been able to get him prone yet, I went ahead and decreased his PEEP slightly because his plateau pressures were so high and that seemed to help plateau pressures some.  I did increase his tidal volumes little higher than I would like to about 7.3 mils per kilo ideal body weight I am hoping that he get his pH back up over 7.1      Plan for disposition:    Hunter Levin MD  09/04/21  07:30 EDT    Time: critical care time 44 minutes thus far today

## 2021-09-04 NOTE — PLAN OF CARE
Goal Outcome Evaluation:  Plan of Care Reviewed With: patient, daughter         Withdraws from pain, straight cath output 600cc, drips titrated for vent synchrony (rita,prop,fent), patient desat to high 70's with sxn but recovered quickly. Dr. Sanchez informed of worsening ABG's, vent settings changed, repeat ABG's show worsening. See AM labs, AM xray. Patients daughters are coming for a 30 minute window visit this morning/dropping off POA paperwork and are to take his belongings home with them.

## 2021-09-04 NOTE — CONSULTS
"Nutrition Services    Patient Name:  Nilson Buchanan  YOB: 1950  MRN: [id number]  Admit Date:  8/22/2021    MD consult for \"Dietitian to Manage Nutritional Support\"    See full RD assessment from 9/3/21 - recommended goal rate for Peptamen VHP is 50 cc/hr. Currently 10 cc/hr with 30 cc water flush Q 4 hours.     Will increase by 10 cc Q 12 hr until goal of 50 reached.     RD to follow.     Electronically signed by:  Caroline Gibson RD  09/04/21 14:29 EDT   "

## 2021-09-05 NOTE — POST-PROCEDURE NOTE
Procedure: Right chest tube  Indication extensive subcutaneous air primarily on the right side with probable tiny right apical pneumothorax may be minimal subpleural.  More importantly following oxygenation since the subcutaneous air develop.  Patient also needing to be prone cannot prone him with the subcu air in declining condition without a chest tube.  Consent: We discussed this with the family they provided informed consent.  Procedure: ChloraPrep x2 maximal barrier precautions 10 cc 1% lidocaine subcutaneously at the procedure site over approximately the sixth rib in the midclavicular line on the right a 4 cm incision was made through the cutaneous tissue then a curved nakia was used to bluntly dissected down to the surface of the rib and then step in the superior aspect of the rib the pleural space was entered and dilated I was able to get my finger and feel the space feel the lung there was a small rush of air when I entered the pleural space.  I placed a 28 [place] chest tube to about 20 cm insertion length we got back a small amount of serous to fail serosanguineous pleural fluid secured with 2-0 silk suture 2 ties the incision was closed with two 3-0 interrupted sutures.  Patient's oxygenation did improve after entered the pleural space.  No Complications awaiting chest x-ray

## 2021-09-05 NOTE — PROGRESS NOTES
Patient has continued to deteriorate we just cannot oxygenate him adequately his saturations are running in the low 80s to high 70s I spoken with his daughters and he is rest I do not think there is anything that we would do that would change what is currently going on and I am afraid that prolonged hypoxia he will be at risk.  Brain damage.  I spoke with Rob's daughter and she is super nice she understands and do not plan to do CPR if he arrests    I have spent additional 42 minutes of critical care time with patient today

## 2021-09-05 NOTE — PROGRESS NOTES
Pulmonary/critical care    I spoke with the daughters a couple of times and just spoke with [facility] again they have talked patient's oxygen saturations in the mid minimal mid 70s for several hours we can get it any higher they are ready to proceed with withdrawal of support and allowing natural death. This could take him a little while to get here. Going to stop the Nimbex and when they are here P spontaneously breathing we will extubate and  full palliative care.  Spent another 29 minutes in critical care time with the patient today

## 2021-09-05 NOTE — NURSING NOTE
Patient continue to be prone on right side, however Spo2 is now trending down.  Dr Levin notified.  Family called and updated on patient status,.

## 2021-09-05 NOTE — NURSING NOTE
Adeola WILSON called and said that she did a chart review of pt chart. States that pt would not meet referral criteria for organ donation.

## 2021-09-05 NOTE — PROGRESS NOTES
LOS: 14 days   Patient Care Team:  Marco Kenney MD as PCP - General (Internal Medicine)    Subjective     Patient was doing pretty good through the night that had made some progress weaning his O2 and then this morning they noticed subcutaneous air and shortly after that decreasing SPO2.  His chest x-ray showed a a lot of subcutaneous air primarily on the right side but not a definite pneumothorax on the right there might be a little bit.  But his saturations kept falling.  We cannot really prone him with that situation and potential for tension pneumothorax.  Talk with the family got informed consent and placed a right chest tube.  There was a small rush of air not a huge rush 1 entered the pleural space but there definitely was blowing air.  After that his oxygen saturations did improve.  Other issue is they had to go up some on his phenylephrine for his blood pressure and he is developed acute kidney injury and hyperkalemia.  Patient remains intubated and sedated on the vent      Review of Systems:          Objective     Vital Signs  Vital Sign Min/Max for last 24 hours  No data recorded   BP  Min: 97/55  Max: 153/68   Pulse  Min: 82  Max: 102   Resp  Min: 27  Max: 34   SpO2  Min: 88 %  Max: 98 %   No data recorded   No data recorded        Ventilator/Non-Invasive Ventilation Settings (From admission, onward)     Start     Ordered    09/04/21 0530  Ventilator - AC/VC; (28); 100; 90%; Other (see comments); 18; 550  Continuous     Question Answer Comment   Vent Mode AC/VC    Breath rate  28   FiO2 100    FiO2 titrate for Sp02% =/> 90%    PEEP Other (see comments)    PEEP: 18    Tidal Volume 550        09/04/21 0529    09/03/21 1551  Ventilator - AC/PC; (26); 100; Other (see comments); 18; 18  Continuous,   Status:  Canceled     Question Answer Comment   Vent Mode AC/PC    Breath rate  26   FiO2 100    PEEP Other (see comments)    PEEP: 18    Inspiratory Pressure 18        09/03/21 1550    08/25/21  0550  NIPPV (CPAP or BIPAP)  Until Discontinued,   Status:  Canceled     Question Answer Comment   Indication: Acute Respiratory Failure    Type: AVAPS/PC/PS    NIPPV Mask Interface: Per Patient Preference    Backup Rate 20    Target VT (mL) 550    EPAP/PEEP (cm H2O) 8    Min Pressure (cm H2O) 10    Max Pressure (cm H2O) 24    Titrate for SPO2 Greater Than or Equal To    Titrate for SPO2 88%    Titrate for SPO2 Per Policy        08/25/21 0550    08/23/21 1341  NIPPV (CPAP or BIPAP)  Until Discontinued,   Status:  Canceled     Question Answer Comment   Indication: Sleep Apnea    Type: BIPAP    NIPPV Mask Interface: Per Patient Preference    Titrate for SPO2 Greater Than or Equal To    Titrate for SPO2 88%    Titrate for SPO2 Per Policy        08/23/21 1340                             Body mass index is 29.57 kg/m².  I/O last 3 completed shifts:  In: 5425.1 [I.V.:4839.1; Other:240; NG/GT:346]  Out: 2125 [Urine:2125]  No intake/output data recorded.        Physical Exam:  General Appearance: Well-developed white male he is orally intubated on a ventilator he has a 7-1/2 endotracheal tube at about 24 cm the lips.  He is on assist control tidal volume 500 PEEP of 16,, respiratory rate of 34 I:E 1:1 plateau pressures in the 34 range.  He is sedated with propofol at 35 mics fentanyl at 200 and he is on phenylephrine at 1.4 mics per kilogram per minute and he is paralyzed with Nimbex  Eyes: Conjunctiva are clear and anicteric pupils are about 2 mm and equal bilaterally  ENT: He has the oral endotracheal tube and he has a enteric feeding tube as well, mucous membranes are dry  Neck: No adenopathy or thyromegaly no jugular venous distension, trachea midline  Lungs: He has breath sounds bilaterally he has significant crepitance over the right anterior lateral chest into the neck bilaterally and up into the face.  Cardiac: Regular rate rhythm no murmur  Abdomen: Soft nontender no palpable hepatosplenomegaly or masses he has  active bowel sounds seems to be tolerating tube feeds well  : Erazo cath dependent drainage clear yellow urine  Musculoskeletal: I do not palpate any lower extremity cords.  He does not have any significant lower extremity edema  Skin: No jaundice no petechiae skin is warm and dry  Neuro: Chemically paralyzed with Nimbex  Extremities/P Vascular: No clubbing no cyanosis no edema palpable radial and dorsalis pedis pulses bilaterally  MSE: Unable to assess       Labs:  Results from last 7 days   Lab Units 09/05/21  0339 09/04/21  0335 09/03/21  0841 09/02/21  0351 09/01/21  0149 08/31/21  0415 08/30/21  0229   GLUCOSE mg/dL 143* 108* 107* 135* 150* 148* 153*   SODIUM mmol/L 137 131* 137 132* 133* 134* 132*   POTASSIUM mmol/L 6.0* 5.1 5.5* 5.0 5.0 5.6* 4.6   MAGNESIUM mg/dL 2.9* 2.2 2.7* 2.6* 2.4 2.3 2.1   CO2 mmol/L 20.5* 18.9* 25.2 23.3 23.0 25.0 23.5   CHLORIDE mmol/L 105 100 99 97* 98 98 98   ANION GAP mmol/L 11.5 12.1 12.8 11.7 12.0 11.0 10.5   CREATININE mg/dL 1.94* 1.06 1.01 1.07 0.94 1.07 0.96   BUN mg/dL 46* 29* 28* 29* 28* 25* 22   BUN / CREAT RATIO  23.7 27.4* 27.7* 27.1* 29.8* 23.4 22.9   CALCIUM mg/dL 7.7* 6.4* 8.8 9.0 8.6 8.7 8.4*   EGFR IF NONAFRICN AM mL/min/1.73 34* 69 73 68 79 68 77   ALK PHOS U/L 82 70 86 98 96 106 113   TOTAL PROTEIN g/dL 5.0* 4.9* 6.2 6.6 5.9* 6.0 5.5*   ALT (SGPT) U/L 160* 111* 131* 110* 103* 90* 77*   AST (SGOT) U/L 69* 71* 66* 65* 73* 72* 68*   BILIRUBIN mg/dL 0.6 0.6 1.2 1.4* 1.6* 1.6* 1.3*   ALBUMIN g/dL 3.20* 2.20* 3.80 4.10 3.50 3.80 3.40*   GLOBULIN gm/dL 1.8 2.7 2.4 2.5 2.4 2.2 2.1     Estimated Creatinine Clearance: 37.7 mL/min (A) (by C-G formula based on SCr of 1.94 mg/dL (H)).      Results from last 7 days   Lab Units 09/05/21  0339 09/04/21  0335 09/03/21  0841 09/02/21  0351 09/01/21  0148 08/31/21  0415 08/30/21  0229   WBC 10*3/mm3 14.73* 14.55* 11.14* 14.71* 13.82* 13.19* 13.70*   RBC 10*6/mm3 4.13* 3.88* 4.84 5.08 4.63 4.54 4.48   HEMOGLOBIN g/dL 13.0 12.8* 15.4  16.1 14.8 14.3 14.3   HEMATOCRIT % 40.3 36.5* 45.1 48.3 43.7 42.1 41.9   MCV fL 97.6* 94.1 93.2 95.1 94.4 92.7 93.5   MCH pg 31.5 33.0 31.8 31.7 32.0 31.5 31.9   MCHC g/dL 32.3 35.1 34.1 33.3 33.9 34.0 34.1   RDW % 11.9* 11.9* 12.3 12.6 13.2 12.9 12.9   RDW-SD fl 43.6 40.8 41.6 44.9 45.4 44.0 44.5   MPV fL 9.8 10.3 9.8 9.3 9.3 9.6 9.6   PLATELETS 10*3/mm3 181 232 221 234 233 249 244   NEUTROPHIL % % 92.8*  --  88.1* 93.4* 93.9* 93.5* 94.3*   LYMPHOCYTE % % 3.7*  --  6.5* 3.2* 2.9* 3.2* 3.6*   MONOCYTES % % 2.4*  --  3.1* 2.7* 2.5* 2.2* 1.2*   EOSINOPHIL % % 0.1*  --  1.6 0.1* 0.0* 0.1* 0.1*   BASOPHIL % % 0.2  --  0.4 0.3 0.1 0.2 0.1   IMM GRAN % % 0.8*  --  0.3 0.3 0.6* 0.8* 0.7*   NEUTROS ABS 10*3/mm3 13.66* 11.35* 9.83* 13.73* 12.98* 12.35* 12.92*   LYMPHS ABS 10*3/mm3 0.55*  --  0.72 0.47* 0.40* 0.42* 0.49*   MONOS ABS 10*3/mm3 0.35  --  0.34 0.40 0.34 0.29 0.17   EOS ABS 10*3/mm3 0.02 0.44* 0.18 0.02 0.00 0.01 0.01   BASOS ABS 10*3/mm3 0.03  --  0.04 0.04 0.02 0.02 0.02   IMMATURE GRANS (ABS) 10*3/mm3 0.12*  --  0.03 0.05 0.08* 0.10* 0.09*   NRBC /100 WBC 0.0  --  0.0 0.0 0.0 0.0 0.0     Results from last 7 days   Lab Units 09/05/21  0336   PH, ARTERIAL pH units 7.118*   PO2 ART mm Hg 111.7*   PCO2, ARTERIAL mm Hg 79.8*   HCO3 ART mmol/L 25.8         Results from last 7 days   Lab Units 09/03/21  0841 09/01/21  0149   PROBNP pg/mL 66.5 94.2         Results from last 7 days   Lab Units 09/01/21  0149   PROCALCITONIN ng/mL 0.12         Microbiology Results (last 10 days)     ** No results found for the last 240 hours. **              artificial tears, , Both Eyes, Q4H  artificial tears, , Both Eyes, Q4H  aspirin, 81 mg, Oral, Daily  dexamethasone, 10 mg, Intravenous, Q12H  enoxaparin, 1 mg/kg, Subcutaneous, Q12H  famotidine, 20 mg, Oral, BID AC  fluticasone, 2 spray, Each Nare, Daily  insulin regular, 0-7 Units, Subcutaneous, Q6H  levothyroxine, 150 mcg, Oral, Q AM  melatonin, 10 mg, Oral, Nightly  senna-docusate  sodium, 2 tablet, Oral, BID  sodium chloride, 500 mL, Intravenous, Once  sodium chloride, 10 mL, Intravenous, Q12H  sodium chloride, 10 mL, Intravenous, Q12H  sodium chloride, 10 mL, Intravenous, Q12H  sodium chloride, 10 mL, Intravenous, Q12H  tamsulosin, 0.4 mg, Oral, Daily      cisatracurium (NIMBEX) infusion, 3-10 mcg/kg/min, Last Rate: 3 mcg/kg/min (09/05/21 0843)  fentaNYL citrate-NaCl,  mcg/hr, Last Rate: 200 mcg/hr (09/05/21 0818)  phenylephrine, 0.5-3 mcg/kg/min, Last Rate: 1.4 mcg/kg/min (09/05/21 0718)  propofol, 5-50 mcg/kg/min, Last Rate: 35 mcg/kg/min (09/05/21 0818)        Diagnostics:  XR Chest 1 View    Result Date: 9/4/2021  SINGLE VIEW OF THE CHEST  HISTORY: Heart failure  COMPARISON: 09/03/2021  FINDINGS: Endotracheal tube is in satisfactory position. Right-sided PICC line extends into the superior vena cava cava. Weighted enteric feeding tube is coiled upon itself, with the tip directed towards the cardia of the stomach. Cardiomegaly is present. Bilateral alveolar and interstitial infiltrates are unchanged. No pneumothorax. Lung volumes remain diminished, with bibasilar atelectasis noted. No pneumothorax or definite effusion is seen.      No significant interval change in bilateral pulmonary infiltrates.  This report was finalized on 9/4/2021 5:22 AM by Dr. Rhonda Dsouza M.D.      XR Chest 1 View    Result Date: 9/3/2021  PORTABLE CHEST 09/03/2021 AT 1151 HOURS  CLINICAL HISTORY: Evaluate ET tube placement.  Compared to the previous chest dated 09/02/2021.  In the interval, an endotracheal tube has been placed and appears in satisfactory position with its tip a few centimeters above the maxine. There are extensive diffuse interstitial and airspace infiltrates throughout both lungs consistent with pneumonia that show slight interval worsening. There are no pleural effusions. The heart remains normal in size.  This report was finalized on 9/3/2021 12:27 PM by Dr. Edmar Ibarra M.D.       XR Chest 1 View    Result Date: 9/2/2021  XR CHEST 1 VW-  09/20/2021  HISTORY: Shortness of breath. Covid.  Heart size is within normal limits. Lungs are underinflated. There is moderate bilateral interstitial disease which could represent pulmonary edema or interstitial pneumonia. The right lung has cleared somewhat since the previous study of 08/22/2021 while the interstitial disease on the left appears slightly more severe.  No pneumothorax is seen.      There is moderate bilateral interstitial disease which appears somewhat atypical for Covid pneumonia though this still could account for this finding. Another possibility would be moderate interstitial pulmonary edema. Please correlate. 2. The right lung has cleared somewhat since the previous study of 08/22/2021 while the interstitial disease on the left appears more severe.  This report was finalized on 9/2/2021 1:46 PM by Dr. Nathanael Landaverde M.D.      XR Chest 1 View    Result Date: 8/22/2021  XR CHEST 1 VW-  HISTORY:  Shortness of breath, Covid positive.  COMPARISON:  Chest radiograph 08/18/2021.  FINDINGS:  A single portable view of the chest was obtained. The cardiac silhouette and mediastinal and hilar contours are not significantly changed. There are diffuse patchy coarse opacities throughout both lungs, right greater than left, significantly progressed from 08/18/2021, which are most compatible with multifocal pneumonia in the setting of known Covid. There are questioned trace bilateral pleural effusions. There is multilevel degenerative disc disease.  This report was finalized on 8/22/2021 6:39 PM by Dr. Daniella Bland M.D.      Duplex Venous Lower Extremity - Bilateral CAR    Result Date: 8/29/2021  · Normal bilateral lower extremity venous duplex scan.      XR Chest PA & Lateral    Result Date: 8/18/2021  XR CHEST PA AND LATERAL-  HISTORY:  Covid positive.  COMPARISON:  Chest radiograph 11/20/2014  FINDINGS:  2 views of the chest were obtained.   Support Devices:  None. Cardiac Silhouette/Mediastinum/Fanny:  The cardiac, mediastinal, and hilar contours are within normal limits and not significantly changed. There is calcific aortic atherosclerosis. Lungs/Pleural Spaces:  There are low lung volumes. There are subtle peripheral airspace opacities in both lungs, suspicious for multifocal pneumonia in the setting of known Covid. Pleural spaces are clear. Chest Wall/Diaphragm/Upper Abdomen:  There is multilevel degenerative disc disease. There are surgical clips projecting over the upper abdomen on the lateral view.  This report was finalized on 8/18/2021 11:56 AM by Dr. Daniella Bland M.D.          Chest x-ray reviewed diffuse increased interstitial markings bilaterally endotracheal tube in good position a few centimeters above the main maxine there is a feeding tube terminating in the stomach no seen pneumothorax or pneumomediastinum.    Active Hospital Problems    Diagnosis  POA   • Acute hypoxemic respiratory failure (CMS/HCC) [J96.01]  Yes      Resolved Hospital Problems   No resolved problems to display.         Assessment/Plan     1. COVID-19 infection and pneumonia he is completed remdesivir and high-dose dexamethasone  10 mg twice daily.  He did receive Actemra back on the 23rd   2. Acute hypoxic respiratory failure with ARDS we were making some progress then we get this is there probably a small pneumothorax.  We have a chest tube to go back and see if we can make any further progress obviously the time he is been ill does not costa well I am afraid we are probably in the fibrosing phase of ARDS  3. Acute kidney injury he has been requiring some vasopressors this may be contributing have to consider ERIC as well.  I will get nephrology to see if they think we should stop propofol I will go ahead and do that the reason I am not is he does not have significant metabolic acidosis and usually with ERIC metabolic acidosis as well if nephrology feels we should  stop but certainly will.  Get a get a urinalysis and nephrology consultation looks like he is already on Peptamen tube feeds...  4. Covid related hepatitis with mild elevations in transaminases  5. Steroid-induced hyperglycemia blood sugars adequately controlled  6. Hyponatremia resolved  7. Hyperkalemia worse probably worsened with renal dysfunction give some Kayexalate  8. Hypotension I think most of this is probably related to sedatives he is on phenylephrine trying to wean that down some psych he got quite a bit of fluids yesterday was positive fluid balance. trying because of his respiratory failure not to overload him with fluid but if renal thinks he needs more fluid for reserving kidney function will certainly agree to that.  9. Fluids/lites/nutrition patient is on tube feeds  10. Elevated D-dimer was elevated on admission and has been falling he is getting full dose Lovenox.  He is too unstable to transport for CTA and since he is getting full dose treatment he probably will not change therapy I am going to get lower extremity Dopplers and will be nice to know for sure if there are any clots or not        His family was here seems to be super nice daughters.  I have spoken with them and they seem to understand the condition they know the prognosis is not good      Plan for disposition:    Hunter Levin MD  09/05/21  09:46 EDT    Time: Critical care time 65 minutes thus far today excluding procedures.

## 2021-09-05 NOTE — NURSING NOTE
Patient daughter called and updated on patient status.  Notified that patient needs chest tube due to subq air, and renal function declining.  Patient daughter, Rob Miller, stated that her and patient other daughter ( Chela Lomeli) in agree ance that patient will remain full code at this time. Will continue to monitor,.

## 2021-09-05 NOTE — NURSING NOTE
Patient Spo2 dropping into 70's when supine.  Dr Levin at bedside.  Patient only able to maintain Spo2 greater than 90 when on right side and prone.  Patent to remain in this position until stabilize.  Will continue to monitor.

## 2021-09-05 NOTE — NURSING NOTE
Called [facility] and spoke with Maura Perez.  Patient ruled out for all forms of donation due to covid diagnosis. OK to withdrawal care if family wishes and please call with time of death.  Will continue to monitor,

## 2021-09-05 NOTE — CONSULTS
[facility] Consult Note      Patient Name: Nilson Buchanan  : 1950  MRN: [id number]  Primary Care Physician:  Marco Kenney MD  Referring Physician: Harjinder Pichardo MD  Date of admission: 2021    Subjective     Reason for Consult:  SHERON    HPI:   Nilson Buchanan is a 71 y.o. male was admitted with COVID-19 infection.  On presentation he had mild acute kidney injury with creatinine peak of 4.6 mg/dL which resolved with a creatinine shiv of 0.96 mg/dL.  Earlier today he was found to have elevated creatinine of 1.94 mg/dL and nephrology was consulted.  Earlier today patient was noted with extensive subcutaneous air and was found to have right apical pneumothorax.  Chest tube was placed.  At the time of my evaluation the patient was proned.  He was on rita.    Review of Systems:   Unable to obtain    Personal History     Past Medical History:   Diagnosis Date   • Allergic rhinitis 2014    Patient reports he had allergy testing several years ago in his mid 20s. He reports allergies to molds. He was on immunotherapy for approximately one year and then this was discontinued. He was told he did not need it.   2014--Flonase 2 sprays each nostril daily initiated.   • Benign prostatic hypertrophy 2017--patient reports sudden onset a few days ago with dysuria and BPH obstructive symptoms.  He had intercourse recently and after that begin to have burning with urination which has continued.  He subsequently developed intermittent obstructive symptoms consisting of urgency, weak stream, dribbling and sensation of incomplete voiding.  Digital rectal examination avoided today.  Urinalysis, urine culture, PSA ordered.  Empiric Cipro 500 mg by mouth twice a day ×30 days, Flomax 0.4 mg by mouth daily.  I will have patient follow-up after completion of antibiotics with a PSA 2 days after completion of the antibiotics providing his PSA comes back elevated or above  baseline.   • COVID-19    • Gastroesophageal reflux disease without esophagitis 4/29/2016   • History of acute prostatitis 2/9/2017    03/10/2017--patient seen in follow-up and his symptoms resolved.  PSA is 0.630.  PSA at the initiation of treatment was 1.03.  Urinalysis and urine culture returned negative.  02/29/2017--patient reports sudden onset a few days ago with dysuria and BPH obstructive symptoms.  He had intercourse recently and after that begin to have burning with urination which has continued.  He subsequently developed intermittent obstructive symptoms consisting of urgency, weak stream, dribbling and sensation of incomplete voiding.  Digital rectal examination avoided today.  Urinalysis, urine culture, PSA ordered.  Empiric Cipro 500 mg by mouth twice a day ×30 days, Flomax 0.4 mg by mouth daily.  I will have patient follow-up after completion of antibiotics with a PSA 2 days after completion of the antibiotics providing his PSA comes back elevated or above baseline.   • History of BPPV (benign paroxysmal positional vertigo) 6/20/2016 12/19/2016--patient reports symptoms have resolved.  06/20/2016--patient presents with a new complaint of a several week history of intermittent episodes of dizziness described as a spinning sensation.  It is always associated with abrupt standing for lying down to go to bed.  Only lasts for a very short period of time and there is no other associated symptoms.  He does not feel the symptoms are bad enough at the present time to warrant vestibular therapy.   • History of colon polyps, 01/14/2016--normal study. 2010--serrated adenoma ×1.   5/10/2010    01/14/2016--normal colonoscopy.  Recommended repeat study in 10 years.  05/10/2010--colonoscopy revealed a 3 mm cecal polyp that was removed completely. The remainder of the colonoscopy was normal. Recommend repeat colonoscopy in 5 years. If the polyp is adenomatous, would recommend all first-degree relatives over age 40  be in screening as well. Pathology returned serrated adenoma.   • History of pneumonia 12/06/2014 12/06/2014--patient seen in follow up and reports he is definitely better but he is still having a cough that seems to come and go. This been no fever. He still has some shortness of breath but this is better. Lung examination reveals continued bibasilar crackles but they are somewhat improved over previous. I reviewed the chest x-ray results and even though the chest x-ray shows no definite pneum   • History of Thoracic compression fracture 03/28/2004 03/28/2004--patient fell and suffered a moderate T12 compression fracture. No subluxation or retropulsion of fracture fragments is seen. Approximately 40% loss of anterior vertebral body height.   • Hyperlipidemia 4/29/2016   • Hypogonadism male, 08/14/2018--TRT initiated. 6/10/2013    02/21/2014--treatment for hypothyroidism discontinued. Patient did not feel any better with the testosterone. It also is extremely expensive.  06/10/2013--treatment for hypogonadism begun.      • Hypothyroidism 4/29/2016   • Impaired fasting glucose 4/29/2016   • Male erectile disorder 11/23/2015 11/23/2015--patient presents with complaints of erectile dysfunction. He does have borderline hypogonadism but previous treatment was not particularly effective. I gave him samples of Stendra, Viagra, Staxyn.   • Mood disorder (CMS/[facility]) 1/30/2019   • Multiple environmental allergies 4/29/2016    Patient reports he had allergy testing several years ago in his mid 20s. He reports allergies to molds. He was on immunotherapy for approximately one year and then this was discontinued. He was told he did not need it.   • Obstructive sleep apnea, 09/08/2005--AHI 18.2.  Oxygen saturation 81%.  Patient cannot tolerate CPAP. 9/8/2005 09/08/2005--sleep study revealed an apnea/hypopnea index for total sleep time to be moderately abnormal at 18.2. Lowest oxygen saturation 81%. CPAP was tried but  patient is intolerant.   • Primary hypothyroidism 4/29/2016   • Vitamin D deficiency 4/29/2016       Past Surgical History:   Procedure Laterality Date   • ANKLE SURGERY Left 2004 2004--repair of left ankle fracture with hardware placement.   • CARPAL TUNNEL RELEASE Right 1989 1989--right carpal tunnel release.   • CATARACT EXTRACTION, BILATERAL  08/2019 August 2019--bilateral cataract extirpation with intraocular lens implantation.   • COLONOSCOPY  05/10/2010    05/10/2010--colonoscopy revealed a 3 mm cecal polyp that was removed completely. The remainder of the colonoscopy was normal. Recommend repeat colonoscopy in 5 years. If the polyp is adenomatous, would recommend all first-degree relatives over age 40 be in screening as well. Pathology returned serrated adenoma.   • COLONOSCOPY  01/14/2016 01/14/2016--normal colonoscopy.  Recommended repeat study in 10 years.   • LAPAROSCOPIC CHOLECYSTECTOMY  04/03/2012 04/03/2012--laparoscopic cholecystectomy. Pathology returned cholesterolosis.   • SHOULDER ARTHROSCOPY DISTAL CLAVICLE EXCISION AND OPEN ROTATOR CUFF REPAIR  10/23/2019    October 23, 2019--right shoulder arthroscopy with rotator cuff repair, subacromial decompression, and extensive debridement.  Open distal clavicle excision and open subpectoral biceps tenodesis.   • TONSILLECTOMY  Childhood    Childhood tonsillectomy       Family History: family history includes Cancer in his father; Heart attack in his brother.    Social History:  reports that he has quit smoking. He has never used smokeless tobacco. He reports current alcohol use. He reports that he does not use drugs.    Home Medications:  Prior to Admission medications    Medication Sig Start Date End Date Taking? Authorizing Provider   aspirin 81 MG tablet Take 1 tablet by mouth Daily. 7/11/17  Yes Marco Kenney MD   doxycycline (VIBRAMYCIN) 100 MG capsule Take 2 p.o. now on day 1 and then 1 twice daily x4 more days. 8/18/21  Yes  Marco Kenney MD   ezetimibe (ZETIA) 10 MG tablet Take 1 p.o. daily for high cholesterol 3/4/21  Yes Marco Kenney MD   fluticasone (FLONASE) 50 MCG/ACT nasal spray 2 sprays by Each Nare route Daily. 2/11/20  Yes Marco Kenney MD   HYDROcod Polst-CPM Polst ER (Tussionex Pennkinetic ER) 10-8 MG/5ML ER suspension 1 teaspoon p.o. every 12 hours for cough and congestion 8/18/21  Yes Marco Kenney MD   ivermectin (STROMECTOL) 3 MG tablet tablet Take 4 p.o. daily x5 days. 8/18/21  Yes Marco Kenney MD   levothyroxine (SYNTHROID, LEVOTHROID) 150 MCG tablet Take 1 p.o. daily for low thyroid 3/4/21  Yes Marco Kenney MD   ondansetron (ZOFRAN) 8 MG tablet Take 1/2-1 p.o. every 4 to 6 hours as needed for nausea 8/18/21  Yes Maroc Kenney MD   predniSONE (DELTASONE) 10 MG tablet 5 by mouth daily 7 days, then 4 daily 2 days, 3 daily 2 days, 2 daily 2 days, 1 daily 2 days, one half daily 2 days and discontinue. 8/18/21  Yes Marco Kenney MD   sildenafil (Viagra) 100 MG tablet Take as directed prior to anticipated sexual activity 4/15/21  Yes Marco eKnney MD   simvastatin (ZOCOR) 40 MG tablet Take 1 p.o. daily for high cholesterol. 3/4/21  Yes Marco Kenney MD   tamsulosin (FLOMAX) 0.4 MG capsule 24 hr capsule Take 1 p.o. nightly for prostate symptoms 1/13/21  Yes Marco Kenney MD   Testosterone (AndroGel Pump) 20.25 MG/ACT (1.62%) gel Apply a total of 4 pumps daily as directed 8/5/21  Yes Marco Kenney MD   vitamin D3 125 MCG (5000 UT) capsule capsule Take 1 p.o. daily for low vitamin D 3/4/21  Yes Marco Kenney MD       Allergies:  Allergies   Allergen Reactions   • Cortisone Other (See Comments)     Locks up patient jjoint       Objective     Vitals:   Heart Rate:  [] 84  Resp:  [27-34] 34  BP: ()/(48-81) 112/58  FiO2 (%):  [80 %-100 %] 100 %    Intake/Output Summary (Last 24 hours) at 9/5/2021 1048  Last data filed at 9/5/2021 1000  Gross per 24 hour    Intake 3856.14 ml   Output 1710 ml   Net 2146.14 ml       Physical Exam:    proned unable to examine him    Scheduled Meds:     artificial tears, , Both Eyes, Q4H  artificial tears, , Both Eyes, Q4H  aspirin, 81 mg, Oral, Daily  dexamethasone, 10 mg, Intravenous, Q12H  enoxaparin, 1 mg/kg, Subcutaneous, Q12H  [START ON 9/6/2021] famotidine, 20 mg, Oral, Daily  fluticasone, 2 spray, Each Nare, Daily  insulin regular, 0-7 Units, Subcutaneous, Q6H  levothyroxine, 150 mcg, Oral, Q AM  melatonin, 10 mg, Oral, Nightly  senna-docusate sodium, 2 tablet, Oral, BID  sodium chloride, 500 mL, Intravenous, Once  sodium chloride, 10 mL, Intravenous, Q12H  sodium chloride, 10 mL, Intravenous, Q12H  sodium chloride, 10 mL, Intravenous, Q12H  sodium chloride, 10 mL, Intravenous, Q12H  sodium polystyrene, 30 g, Nasogastric, Once  tamsulosin, 0.4 mg, Oral, Daily      IV Meds:   cisatracurium (NIMBEX) infusion, 3-10 mcg/kg/min, Last Rate: 2.5 mcg/kg/min (09/05/21 1016)  fentaNYL citrate-NaCl,  mcg/hr, Last Rate: 200 mcg/hr (09/05/21 0818)  phenylephrine, 0.5-3 mcg/kg/min, Last Rate: 1.4 mcg/kg/min (09/05/21 0718)  propofol, 5-50 mcg/kg/min, Last Rate: 30 mcg/kg/min (09/05/21 1011)        Results Reviewed:   I have personally reviewed the results from the time of this admission to 9/5/2021 10:48 EDT     Lab Results   Component Value Date    GLUCOSE 143 (H) 09/05/2021    CALCIUM 7.7 (L) 09/05/2021     09/05/2021    K 6.0 (H) 09/05/2021    CO2 20.5 (L) 09/05/2021     09/05/2021    BUN 46 (H) 09/05/2021    CREATININE 1.94 (H) 09/05/2021    EGFRIFAFRI 66 02/11/2020    EGFRIFNONA 34 (L) 09/05/2021    BCR 23.7 09/05/2021    ANIONGAP 11.5 09/05/2021      Lab Results   Component Value Date    MG 2.9 (H) 09/05/2021    PHOS 5.5 (H) 09/05/2021    ALBUMIN 3.20 (L) 09/05/2021           Assessment / Plan     ASSESSMENT:  Acute kidney injury suspect ATN in the setting of worsening shock.  Potentially could also be secondary to ERIC  with hyperkalemia and metabolic acidosis with prolonged propofol use.  COVID-19 infection  Acute respiratory failure  Shock  Pneumothorax    PLAN:  -Check CK, triglyceride, urine studies  -Switch tube feeds to low potassium  -Start Bumex 4 mg IV twice daily  -Preferably switch him off Lovenox to heparin drip given worsening renal function  -Goal MAP above 65 mmHg  -Avoid nephrotoxins    Thank you for involving us in the care of Nilson Buchanan.  Please feel free to call with any questions.    Sujit Crawford MD  09/05/21  10:48 EDT    [facility]  [phone]

## 2021-09-05 NOTE — PLAN OF CARE
Goal Outcome Evaluation:   Patient remains in CCU on rotoprone bed. AM ABG worse than previous with pH of 7.11, Nidadavolu MD ordered tidal volume to be increased to 550. VSS.  Patient tolerated rotoprone turns well. Sedated with propofol and fentanyl, paralyzed with nimbex.

## 2021-09-06 PROCEDURE — 94003 VENT MGMT INPAT SUBQ DAY: CPT

## 2021-09-06 NOTE — PROGRESS NOTES
Event note    Asked evaluate the patient and update family about the plan given progressive decline and concerns for overall poor quality of life.  Discussed with Dr. Levin about events from earlier in the day and reviewed several notes in the chart as well as work-up and imaging personally.  Patient has been declining further with regards to his acute respiratory failure from COVID-19 pneumonia and is on prone ventilation and with ARDS with not much improvement in clinical status and family after discussion with Dr. Levin and among themselves has decided to withdraw life-sustaining support due to lack of any meaningful quality of life and hope for recovery.  Patient was previously paralyzed and paralytics were discontinued at 7:00.  I reevaluated patient and he is not paralyzed currently and coughing with suctioning and breathing over the ventilator.  He is having persistent hypoxemia with sats in the low 60s.  He is progressively declining at this point and we will go ahead and discuss with family and respect their wishes.    Discussed with 2 daughters outside the room and they all agreed to withdraw life-sustaining support.  Patient currently not paralyzed and hence we will stop all medications and switch him to full palliative care with comfort measures only.  Fentanyl as well as Versed will be pushed by the nurse.  Discussed with the nurse about the plan.    Diagnosis  Acute hypoxic respiratory failure s/p mechanical ventilator  ARDS on prone ventilation  Acute COVID-19 pneumonia  Right apical pneumothorax  Acute hepatitis  Acute renal failure    CC-40 minutes

## 2021-09-06 NOTE — DISCHARGE SUMMARY
Discharge/death summary    Diagnoses:  1. COVID-19 infection and pneumonia  2. ARDS secondary to #1  3. Acute kidney injury  4. Covid related hepatitis  5. Steroid-induced hyperglycemia  6. Hyponatremia  7. Hyperkalemia  8. Hypotension  9. Pneumothorax pneumomediastinum subcutaneous air      Hospital course: In brief patient admitted with COVID-19 infection and pneumonia developed ARDS acute kidney injury, he was supported on the ventilator he was proned he just continue to decline with worsening pulmonary function and then some worsening renal function, he developed some pneumomediastinum extensive subcutaneous air started dropping his saturations plus or minus a small pneumothorax chest tube was placed he continued to decline we had difficulties maintaining oxygen saturations.  His family was super nice and always available.  had many discussions and they knew he did not want to live majorly debilitated as it got to where he was spending hours with saturations in the low 70s and lower and nothing further for us to offer they decided to allow natural death which I think was the right decision and the patient .

## 2021-09-06 NOTE — NURSING NOTE
Dr Sanchez on floor speaking to patient family and assessed patient.  Orders received for terminal extubation. Will continue to monitor.